# Patient Record
Sex: FEMALE | Race: BLACK OR AFRICAN AMERICAN | Employment: OTHER | URBAN - METROPOLITAN AREA
[De-identification: names, ages, dates, MRNs, and addresses within clinical notes are randomized per-mention and may not be internally consistent; named-entity substitution may affect disease eponyms.]

---

## 2017-02-18 ENCOUNTER — APPOINTMENT (OUTPATIENT)
Dept: NUCLEAR MEDICINE | Age: 72
End: 2017-02-18
Attending: NURSE PRACTITIONER
Payer: MEDICARE

## 2017-02-18 ENCOUNTER — HOSPITAL ENCOUNTER (OUTPATIENT)
Age: 72
Setting detail: OBSERVATION
Discharge: HOME | End: 2017-02-19
Attending: EMERGENCY MEDICINE | Admitting: HOSPITALIST
Payer: MEDICARE

## 2017-02-18 DIAGNOSIS — N18.4 CKD (CHRONIC KIDNEY DISEASE), STAGE 4 (SEVERE): ICD-10-CM

## 2017-02-18 DIAGNOSIS — I82.411 ACUTE DEEP VEIN THROMBOSIS (DVT) OF FEMORAL VEIN OF RIGHT LOWER EXTREMITY (HCC): Primary | ICD-10-CM

## 2017-02-18 DIAGNOSIS — R06.09 DOE (DYSPNEA ON EXERTION): ICD-10-CM

## 2017-02-18 LAB
ALBUMIN SERPL BCP-MCNC: 3.6 G/DL (ref 3.5–5)
ALBUMIN/GLOB SERPL: 0.9 {RATIO} (ref 1.1–2.2)
ALP SERPL-CCNC: 102 U/L (ref 45–117)
ALT SERPL-CCNC: 24 U/L (ref 12–78)
ANION GAP BLD CALC-SCNC: 7 MMOL/L (ref 5–15)
APTT PPP: 23.4 SEC (ref 22.1–32.5)
AST SERPL W P-5'-P-CCNC: 16 U/L (ref 15–37)
BASOPHILS # BLD AUTO: 0 K/UL (ref 0–0.1)
BASOPHILS # BLD: 0 % (ref 0–1)
BILIRUB SERPL-MCNC: 0.5 MG/DL (ref 0.2–1)
BUN SERPL-MCNC: 39 MG/DL (ref 6–20)
BUN/CREAT SERPL: 14 (ref 12–20)
CALCIUM SERPL-MCNC: 9.2 MG/DL (ref 8.5–10.1)
CHLORIDE SERPL-SCNC: 106 MMOL/L (ref 97–108)
CO2 SERPL-SCNC: 28 MMOL/L (ref 21–32)
CREAT SERPL-MCNC: 2.85 MG/DL (ref 0.55–1.02)
EOSINOPHIL # BLD: 0.1 K/UL (ref 0–0.4)
EOSINOPHIL NFR BLD: 1 % (ref 0–7)
ERYTHROCYTE [DISTWIDTH] IN BLOOD BY AUTOMATED COUNT: 18.4 % (ref 11.5–14.5)
GLOBULIN SER CALC-MCNC: 3.8 G/DL (ref 2–4)
GLUCOSE SERPL-MCNC: 67 MG/DL (ref 65–100)
HCT VFR BLD AUTO: 36.8 % (ref 35–47)
HGB BLD-MCNC: 11.9 G/DL (ref 11.5–16)
INR PPP: 1 (ref 0.9–1.1)
LYMPHOCYTES # BLD AUTO: 12 % (ref 12–49)
LYMPHOCYTES # BLD: 1 K/UL (ref 0.8–3.5)
MCH RBC QN AUTO: 28.5 PG (ref 26–34)
MCHC RBC AUTO-ENTMCNC: 32.3 G/DL (ref 30–36.5)
MCV RBC AUTO: 88.2 FL (ref 80–99)
MONOCYTES # BLD: 1 K/UL (ref 0–1)
MONOCYTES NFR BLD AUTO: 12 % (ref 5–13)
NEUTS SEG # BLD: 5.9 K/UL (ref 1.8–8)
NEUTS SEG NFR BLD AUTO: 75 % (ref 32–75)
PLATELET # BLD AUTO: 166 K/UL (ref 150–400)
POTASSIUM SERPL-SCNC: 4.1 MMOL/L (ref 3.5–5.1)
PROT SERPL-MCNC: 7.4 G/DL (ref 6.4–8.2)
PROTHROMBIN TIME: 10.4 SEC (ref 9–11.1)
RBC # BLD AUTO: 4.17 M/UL (ref 3.8–5.2)
SODIUM SERPL-SCNC: 141 MMOL/L (ref 136–145)
THERAPEUTIC RANGE,PTTT: NORMAL SECS (ref 58–77)
TROPONIN I SERPL-MCNC: 0.04 NG/ML
WBC # BLD AUTO: 8 K/UL (ref 3.6–11)

## 2017-02-18 PROCEDURE — 85730 THROMBOPLASTIN TIME PARTIAL: CPT | Performed by: EMERGENCY MEDICINE

## 2017-02-18 PROCEDURE — 80053 COMPREHEN METABOLIC PANEL: CPT | Performed by: NURSE PRACTITIONER

## 2017-02-18 PROCEDURE — 36415 COLL VENOUS BLD VENIPUNCTURE: CPT | Performed by: NURSE PRACTITIONER

## 2017-02-18 PROCEDURE — 93971 EXTREMITY STUDY: CPT

## 2017-02-18 PROCEDURE — 85025 COMPLETE CBC W/AUTO DIFF WBC: CPT | Performed by: NURSE PRACTITIONER

## 2017-02-18 PROCEDURE — 99218 HC RM OBSERVATION: CPT

## 2017-02-18 PROCEDURE — 99284 EMERGENCY DEPT VISIT MOD MDM: CPT

## 2017-02-18 PROCEDURE — 85610 PROTHROMBIN TIME: CPT | Performed by: EMERGENCY MEDICINE

## 2017-02-18 PROCEDURE — 84484 ASSAY OF TROPONIN QUANT: CPT | Performed by: NURSE PRACTITIONER

## 2017-02-18 PROCEDURE — 93005 ELECTROCARDIOGRAM TRACING: CPT

## 2017-02-18 PROCEDURE — A9558 XE133 XENON 10MCI: HCPCS

## 2017-02-18 RX ORDER — ENOXAPARIN SODIUM 100 MG/ML
1 INJECTION SUBCUTANEOUS
Status: DISCONTINUED | OUTPATIENT
Start: 2017-02-18 | End: 2017-02-19 | Stop reason: SDUPTHER

## 2017-02-18 NOTE — IP AVS SNAPSHOT
Current Discharge Medication List  
  
Take these medications at their scheduled times Dose & Instructions Dispensing Information Comments Morning Noon Evening Bedtime ACTOS 30 mg tablet Generic drug:  pioglitazone Your next dose is: Today, Tomorrow Other:  ____________ Dose:  15 mg Take 15 mg by mouth daily. Refills:  0  
     
   
   
   
  
 amLODIPine 5 mg tablet Commonly known as:  [place] Dipika Your next dose is: Today, Tomorrow Other:  ____________ Dose:  5 mg Take 5 mg by mouth daily. Refills:  0  
     
   
   
   
  
 apixaban 5 mg tablet Commonly known as:  Keyanna Niece Your next dose is: Today, Tomorrow Other:  ____________ Dose:  5 mg Take 1 Tab by mouth every twelve (12) hours. Quantity:  60 Tab Refills:  0  
     
   
   
   
  
 aspirin delayed-release 81 mg tablet Your next dose is: Today, Tomorrow Other:  ____________ Dose:  81 mg Take 1 Tab by mouth daily. Quantity:  30 Tab Refills:  0  
     
   
   
   
  
 bumetanide 2 mg tablet Commonly known as:  Fara Shruti Your next dose is: Today, Tomorrow Other:  ____________ Dose:  2 mg Take 2 mg by mouth daily. Indications: EDEMA Refills:  0 DULoxetine 60 mg capsule Commonly known as:  CYMBALTA Your next dose is: Today, Tomorrow Other:  ____________ Dose:  60 mg Take 60 mg by mouth daily. Refills:  0 HumaLOG Mix 75-25 100 unit/mL (75-25) injection Generic drug:  insulin lispro protamine/insulin lispro Your next dose is: Today, Tomorrow Other:  ____________ Dose:  22 Units 22 Units by SubCUTAneous route two (2) times a day. Refills:  0  
     
   
   
   
  
 VITAMIN D3 1,000 unit Cap Generic drug:  cholecalciferol Your next dose is: Today, Tomorrow Other:  ____________ Dose:  1000 Units Take 1,000 Units by mouth daily. Refills:  0 Take these medications as needed Dose & Instructions Dispensing Information Comments Morning Noon Evening Bedtime HYDROcodone-acetaminophen 7.5-325 mg per tablet Commonly known as:  Shmuel Peacock Your next dose is: Today, Tomorrow Other:  ____________ Dose:  1 Tab Take 1 Tab by mouth every four (4) hours as needed. Max Daily Amount: 6 Tabs. Quantity:  8 Tab Refills:  0 Take these medications as directed Dose & Instructions Dispensing Information Comments Morning Noon Evening Bedtime  
 atorvastatin 40 mg tablet Commonly known as:  LIPITOR Your next dose is: Today, Tomorrow Other:  ____________ Refills:  0 CENTRUM COMPLETE PO Your next dose is: Today, Tomorrow Other:  ____________ Take  by mouth. Refills:  0 Where to Get Your Medications Information about where to get these medications is not yet available ! Ask your nurse or doctor about these medications  
  apixaban 5 mg tablet  
 aspirin delayed-release 81 mg tablet HYDROcodone-acetaminophen 7.5-325 mg per tablet

## 2017-02-18 NOTE — ED TRIAGE NOTES
Pt states that she woke up this morning with pain to her right calf and has now moved up behind her right knee. Pt states that she has had right foot swelling for the past 2 months and was told by her PCP to come to the ED to rule out a DVT. Pt is currently on aggronox.

## 2017-02-18 NOTE — PROCEDURES
[facility]  *** FINAL REPORT ***    Name: Yanet Pickett  MRN: [id number]  : 01 Sep 1945  HIS Order #: [id number]  [address] [facility] Visit #: [id number]  Date: 2017    TYPE OF TEST: Peripheral Venous Testing    REASON FOR TEST  Limb swelling    Right Leg:-  Deep venous thrombosis:           Yes  Proximal extent of thrombus:      Mid Femoral  Superficial venous thrombosis:    No  Deep venous insufficiency:        Not examined  Superficial venous insufficiency: Not examined      INTERPRETATION/FINDINGS  PROCEDURE:  Color duplex ultrasound imaging of lower extremity veins. FINDINGS:       Right:  Consistent with thrombosis involving the mid and distal  femoral, popliteal and gastroc veins as demonstrated by vein  non-compressibility, and by a narrowing or occlusion of the flow  channel on color Doppler imaging. The calf veins are suboptimally  visualized but are suggestive of partially occlusive thrombus. The  common femoral, deep femoral, proximal femoral and great saphenous are   patent and without evidence of thrombus where visualized; each is is  compressible and there is no narrowing of the flow channel on color  Doppler imaging. Phasic flow is observed in the common femoral vein. Left:   The common femoral vein is patent and without evidence of   thrombus. Phasic flow is observed. This extremity was not otherwise   evaluated. IMPRESSION:  There is evidence of vein thrombosis, as described above. The ultrasound appearance is more consistent with an acute process  than a chronic process. ADDITIONAL COMMENTS    I have personally reviewed the data relevant to the interpretation of  this  study.     TECHNOLOGIST: Rene Blackman RVT  Signed: 2017 05:32 PM    PHYSICIAN: Hipolito Morrell MD  Signed: 2017 08:02 AM

## 2017-02-18 NOTE — IP AVS SNAPSHOT
[address] 
[phone] Patient: Amalia Redding MRN: [id number] CHW:[id number] You are allergic to the following No active allergies Recent Documentation Height Weight Breastfeeding? BMI OB Status Smoking Status 1.549 m 88 kg No 36.66 kg/m2 Hysterectomy Never Smoker Emergency Contacts Name Discharge Info Relation Home Work Mobile Jasiel Moreira DISCHARGE CAREGIVER [3] Spouse [3] [phone] About your hospitalization You were admitted on:  February 18, 2017 You last received care in the:  [facility] You were discharged on:  February 19, 2017 Unit phone number:  [phone] Why you were hospitalized Your primary diagnosis was:  Right Leg Dvt (Hcc) Providers Seen During Your Hospitalizations Provider Role Specialty Primary office phone Litzy Navarro MD Attending Provider Emergency Medicine [phone] Yakelin Hilario MD Attending Provider Internal Medicine [phone] Mitzie Boeck, MD Attending Provider Hospitalist [phone] Your Primary Care Physician (PCP) Primary Care Physician Office Phone Office Fax Jory Abler [phone] Follow-up Information Follow up With Details Comments Contact Info Jeovany Brooke MD Schedule an appointment as soon as possible for a visit in 1 week for hospital discharge follow-up, lab check [place] [address] 
[phone] Current Discharge Medication List  
  
START taking these medications Dose & Instructions Dispensing Information Comments Morning Noon Evening Bedtime  
 apixaban 5 mg tablet Commonly known as:  Annel Minus Your next dose is: Today, Tomorrow Other:  _________ Dose:  5 mg Take 1 Tab by mouth every twelve (12) hours. Quantity:  60 Tab Refills:  0  
     
   
   
   
  
 aspirin delayed-release 81 mg tablet Your next dose is: Today, Tomorrow Other:  _________ Dose:  81 mg Take 1 Tab by mouth daily. Quantity:  30 Tab Refills:  0 HYDROcodone-acetaminophen 7.5-325 mg per tablet Commonly known as:  Angeline Punt Your next dose is: Today, Tomorrow Other:  _________ Dose:  1 Tab Take 1 Tab by mouth every four (4) hours as needed. Max Daily Amount: 6 Tabs. Quantity:  8 Tab Refills:  0 CONTINUE these medications which have NOT CHANGED Dose & Instructions Dispensing Information Comments Morning Noon Evening Bedtime ACTOS 30 mg tablet Generic drug:  pioglitazone Your next dose is: Today, Tomorrow Other:  _________ Dose:  15 mg Take 15 mg by mouth daily. Refills:  0  
     
   
   
   
  
 amLODIPine 5 mg tablet Commonly known as:  Tiffani Kev Your next dose is: Today, Tomorrow Other:  _________ Dose:  5 mg Take 5 mg by mouth daily. Refills:  0  
     
   
   
   
  
 atorvastatin 40 mg tablet Commonly known as:  LIPITOR Your next dose is: Today, Tomorrow Other:  _________ Refills:  0  
     
   
   
   
  
 bumetanide 2 mg tablet Commonly known as:  Assunta Brink Your next dose is: Today, Tomorrow Other:  _________ Dose:  2 mg Take 2 mg by mouth daily. Indications: EDEMA Refills:  0 CENTRUM COMPLETE PO Your next dose is: Today, Tomorrow Other:  _________ Take  by mouth. Refills:  0 DULoxetine 60 mg capsule Commonly known as:  CYMBALTA Your next dose is: Today, Tomorrow Other:  _________ Dose:  60 mg Take 60 mg by mouth daily. Refills:  0 HumaLOG Mix 75-25 100 unit/mL (75-25) injection Generic drug:  insulin lispro protamine/insulin lispro Your next dose is: Today, Tomorrow Other:  _________ Dose:  22 Units 22 Units by SubCUTAneous route two (2) times a day. Refills:  0  
     
   
   
   
  
 VITAMIN D3 1,000 unit Cap Generic drug:  cholecalciferol Your next dose is: Today, Tomorrow Other:  _________ Dose:  1000 Units Take 1,000 Units by mouth daily. Refills:  0 STOP taking these medications   
 aspirin-dipyridamole  mg per SR capsule Commonly known as:  AGGRENOX Where to Get Your Medications Information on where to get these meds will be given to you by the nurse or doctor. ! Ask your nurse or doctor about these medications  
  apixaban 5 mg tablet  
 aspirin delayed-release 81 mg tablet HYDROcodone-acetaminophen 7.5-325 mg per tablet Discharge Instructions Discharge Instructions PATIENT ID: Fatimah Oh MRN: [id number] YOB: 1945 DATE OF ADMISSION: 2/18/2017  4:45 PM   
DATE OF DISCHARGE: 2/19/2017 PRIMARY CARE PROVIDER: Fanny Pa MD  
 
ATTENDING PHYSICIAN: Opal Saleh MD 
DISCHARGING PROVIDER: Opal Saleh MD   
To contact this individual call [phone] and ask the  to page. If unavailable ask to be transferred the Adult Hospitalist Department. DISCHARGE DIAGNOSES DVT (blood clot in right leg) CONSULTATIONS: IP CONSULT TO HOSPITALIST 
 
PROCEDURES/SURGERIES: * No surgery found * PENDING TEST RESULTS:  
At the time of discharge the following test results are still pending: n/a FOLLOW UP APPOINTMENTS:  
Follow-up Information Follow up With Details Comments Contact Info Fanny Pa MD Schedule an appointment as soon as possible for a visit in 1 week for hospital discharge follow-up, lab check [place] [address] 
[phone] ADDITIONAL CARE RECOMMENDATIONS:  
You were admitted with a blood clot in the right leg. Take the blood thinner as prescribed. Be mindful for any bleeding. If you experience significant bleeding, return to the hospital. Follow-up with your primary doctor otherwise for routine care. Your Aggrenox was stopped and changed to aspirin. Do not resume Aggrenox while on Eliquis, at least until you follow-up with your primary doctor. DIET: Cardiac Diet ACTIVITY: Activity as tolerated WOUND CARE: n/a 
 
EQUIPMENT needed: n/a DISCHARGE MEDICATIONS: 
 See Medication Reconciliation Form · It is important that you take the medication exactly as they are prescribed. · Keep your medication in the bottles provided by the pharmacist and keep a list of the medication names, dosages, and times to be taken in your wallet. · Do not take other medications without consulting your doctor. NOTIFY YOUR PHYSICIAN FOR ANY OF THE FOLLOWING:  
Fever over 101 degrees for 24 hours. Chest pain, shortness of breath, fever, chills, nausea, vomiting, diarrhea, change in mentation, falling, weakness, bleeding. Severe pain or pain not relieved by medications. Or, any other signs or symptoms that you may have questions about. DISPOSITION: 
x  Home With: 
 OT  PT  [facility]  RN  
  
 SNF/Inpatient Rehab/LTAC Independent/assisted living Hospice Other: CDMP Checked:  
Yes x PROBLEM LIST Updated: 
Yes x Signed:  
Kathleen Schlatter, MD 
2/19/2017 1:35 PM 
 
 
 
DISCHARGE SUMMARY from Nurse The following personal items are in your possession at time of discharge: 
 
Dental Appliances: None Visual Aid: Glasses Home Medications: None Jewelry: Tod Adolfo Clothing: Pants, Shirt, Undergarments, Other (comment), Socks Other Valuables: None PATIENT INSTRUCTIONS: 
 
After general anesthesia or intravenous sedation, for 24 hours or while taking prescription Narcotics: · Limit your activities · Do not drive and operate hazardous machinery · Do not make important personal or business decisions · Do  not drink alcoholic beverages · If you have not urinated within 8 hours after discharge, please contact your surgeon on call. Report the following to your surgeon: 
· Excessive pain, swelling, redness or odor of or around the surgical area · Temperature over 100.5 · Nausea and vomiting lasting longer than 4 hours or if unable to take medications · Any signs of decreased circulation or nerve impairment to extremity: change in color, persistent  numbness, tingling, coldness or increase pain · Any questions These are general instructions for a healthy lifestyle: No smoking/ No tobacco products/ Avoid exposure to second hand smoke Surgeon General's Warning:  Quitting smoking now greatly reduces serious risk to your health. Obesity, smoking, and sedentary lifestyle greatly increases your risk for illness A healthy diet, regular physical exercise & weight monitoring are important for maintaining a healthy lifestyle You may be retaining fluid if you have a history of heart failure or if you experience any of the following symptoms:  Weight gain of 3 pounds or more overnight or 5 pounds in a week, increased swelling in our hands or feet or shortness of breath while lying flat in bed. Please call your doctor as soon as you notice any of these symptoms; do not wait until your next office visit. Recognize signs and symptoms of STROKE: 
 
F-face looks uneven A-arms unable to move or move unevenly S-speech slurred or non-existent T-time-call 911 as soon as signs and symptoms begin-DO NOT go Back to bed or wait to see if you get better-TIME IS BRAIN. Warning Signs of HEART ATTACK Call 911 if you have these symptoms: 
? Chest discomfort.  Most heart attacks involve discomfort in the center of the chest that lasts more than a few minutes, or that goes away and comes back. It can feel like uncomfortable pressure, squeezing, fullness, or pain. ? Discomfort in other areas of the upper body. Symptoms can include pain or discomfort in one or both arms, the back, neck, jaw, or stomach. ? Shortness of breath with or without chest discomfort. ? Other signs may include breaking out in a cold sweat, nausea, or lightheadedness. Don't wait more than five minutes to call [address]! Fast action can save your life. Calling 911 is almost always the fastest way to get lifesaving treatment. Emergency Medical Services staff can begin treatment when they arrive  up to an hour sooner than if someone gets to the hospital by car. The discharge information has been reviewed with the patient. The patient verbalized understanding. Discharge medications reviewed with the patient and appropriate educational materials and side effects teaching were provided. [employer] Activation Thank you for requesting access to [employer]. Please follow the instructions below to securely access and download your online medical record. [employer] allows you to send messages to your doctor, view your test results, renew your prescriptions, schedule appointments, and more. How Do I Sign Up? 1. In your internet browser, go to [email or web address] 
2. Click on the First Time User? Click Here link in the Sign In box. You will be redirect to the New Member Sign Up page. 3. Enter your [employer] Access Code exactly as it appears below. You will not need to use this code after youve completed the sign-up process. If you do not sign up before the expiration date, you must request a new code. [employer] Access Code: [email or web address] Expires: 2017  9:13 AM (This is the date your [employer] access code will ) 4.  Enter the last four digits of your Social Security Number (xxxx) and Date of Birth (mm/dd/yyyy) as indicated and click Submit. You will be taken to the next sign-up page. 5. Create a [employer] ID. This will be your [employer] login ID and cannot be changed, so think of one that is secure and easy to remember. 6. Create a [employer] password. You can change your password at any time. 7. Enter your Password Reset Question and Answer. This can be used at a later time if you forget your password. 8. Enter your e-mail address. You will receive e-mail notification when new information is available in [address] 9. Click Sign Up. You can now view and download portions of your medical record. 10. Click the Download Summary menu link to download a portable copy of your medical information. Additional Information If you have questions, please visit the Frequently Asked Questions section of the [employer] website at [email or web address] Remember, [employer] is NOT to be used for urgent needs. For medical emergencies, dial 911. Discharge Orders None [employer] Announcement We are excited to announce that we are making your provider's discharge notes available to you in [employer]. You will see these notes when they are completed and signed by the physician that discharged you from your recent hospital stay. If you have any questions or concerns about any information you see in [employer], please call the Health Information Department where you were seen or reach out to your Primary Care Provider for more information about your plan of care. Introducing [facility]! [facility] introduces [employer] patient portal. Now you can access parts of your medical record, email your doctor's office, and request medication refills online. 1. In your internet browser, go to [email or web address] 2. Click on the First Time User? Click Here link in the Sign In box. You will see the New Member Sign Up page. 3. Enter your [employer] Access Code exactly as it appears below. You will not need to use this code after youve completed the sign-up process. If you do not sign up before the expiration date, you must request a new code. · [employer] Access Code: [email or web address] Expires: 2/20/2017  9:13 AM 
 
4. Enter the last four digits of your Social Security Number (xxxx) and Date of Birth (mm/dd/yyyy) as indicated and click Submit. You will be taken to the next sign-up page. 5. Create a [employer] ID. This will be your [employer] login ID and cannot be changed, so think of one that is secure and easy to remember. 6. Create a [employer] password. You can change your password at any time. 7. Enter your Password Reset Question and Answer. This can be used at a later time if you forget your password. 8. Enter your e-mail address. You will receive e-mail notification when new information is available in [address] 9. Click Sign Up. You can now view and download portions of your medical record. 10. Click the Download Summary menu link to download a portable copy of your medical information. If you have questions, please visit the Frequently Asked Questions section of the [employer] website. Remember, [employer] is NOT to be used for urgent needs. For medical emergencies, dial 911. Now available from your iPhone and Android! General Information Please provide this summary of care documentation to your next provider. Patient Signature:  ____________________________________________________________ Date:  ____________________________________________________________  
  
Elmymurray Naylor Provider Signature:  ____________________________________________________________ Date:  ____________________________________________________________

## 2017-02-19 VITALS
OXYGEN SATURATION: 98 % | HEART RATE: 77 BPM | DIASTOLIC BLOOD PRESSURE: 73 MMHG | WEIGHT: 194 LBS | HEIGHT: 61 IN | SYSTOLIC BLOOD PRESSURE: 154 MMHG | BODY MASS INDEX: 36.63 KG/M2 | RESPIRATION RATE: 16 BRPM | TEMPERATURE: 97.8 F

## 2017-02-19 PROBLEM — I82.401 RIGHT LEG DVT (HCC): Status: ACTIVE | Noted: 2017-02-19

## 2017-02-19 LAB
ANION GAP BLD CALC-SCNC: 12 MMOL/L (ref 5–15)
ATRIAL RATE: 77 BPM
BUN SERPL-MCNC: 33 MG/DL (ref 6–20)
BUN/CREAT SERPL: 12 (ref 12–20)
CALCIUM SERPL-MCNC: 8.4 MG/DL (ref 8.5–10.1)
CALCULATED P AXIS, ECG09: 61 DEGREES
CALCULATED R AXIS, ECG10: 16 DEGREES
CALCULATED T AXIS, ECG11: 170 DEGREES
CHLORIDE SERPL-SCNC: 108 MMOL/L (ref 97–108)
CO2 SERPL-SCNC: 26 MMOL/L (ref 21–32)
CREAT SERPL-MCNC: 2.68 MG/DL (ref 0.55–1.02)
DIAGNOSIS, 93000: NORMAL
GLUCOSE BLD STRIP.AUTO-MCNC: 177 MG/DL (ref 65–100)
GLUCOSE BLD STRIP.AUTO-MCNC: 216 MG/DL (ref 65–100)
GLUCOSE BLD STRIP.AUTO-MCNC: 233 MG/DL (ref 65–100)
GLUCOSE SERPL-MCNC: 198 MG/DL (ref 65–100)
P-R INTERVAL, ECG05: 154 MS
POTASSIUM SERPL-SCNC: 4 MMOL/L (ref 3.5–5.1)
Q-T INTERVAL, ECG07: 402 MS
QRS DURATION, ECG06: 70 MS
QTC CALCULATION (BEZET), ECG08: 454 MS
SERVICE CMNT-IMP: ABNORMAL
SODIUM SERPL-SCNC: 146 MMOL/L (ref 136–145)
TROPONIN I SERPL-MCNC: 0.05 NG/ML
TROPONIN I SERPL-MCNC: 0.05 NG/ML
VENTRICULAR RATE, ECG03: 77 BPM

## 2017-02-19 PROCEDURE — 82962 GLUCOSE BLOOD TEST: CPT

## 2017-02-19 PROCEDURE — 93306 TTE W/DOPPLER COMPLETE: CPT

## 2017-02-19 PROCEDURE — 80048 BASIC METABOLIC PNL TOTAL CA: CPT | Performed by: NURSE PRACTITIONER

## 2017-02-19 PROCEDURE — 74011636637 HC RX REV CODE- 636/637: Performed by: NURSE PRACTITIONER

## 2017-02-19 PROCEDURE — 74011250637 HC RX REV CODE- 250/637: Performed by: HOSPITALIST

## 2017-02-19 PROCEDURE — A9270 NON-COVERED ITEM OR SERVICE: HCPCS | Performed by: NURSE PRACTITIONER

## 2017-02-19 PROCEDURE — 84484 ASSAY OF TROPONIN QUANT: CPT | Performed by: NURSE PRACTITIONER

## 2017-02-19 PROCEDURE — 36415 COLL VENOUS BLD VENIPUNCTURE: CPT | Performed by: NURSE PRACTITIONER

## 2017-02-19 PROCEDURE — 74011250636 HC RX REV CODE- 250/636: Performed by: NURSE PRACTITIONER

## 2017-02-19 PROCEDURE — 74011250637 HC RX REV CODE- 250/637: Performed by: NURSE PRACTITIONER

## 2017-02-19 PROCEDURE — 96372 THER/PROPH/DIAG INJ SC/IM: CPT

## 2017-02-19 PROCEDURE — 99218 HC RM OBSERVATION: CPT

## 2017-02-19 RX ORDER — SODIUM CHLORIDE 0.9 % (FLUSH) 0.9 %
5-10 SYRINGE (ML) INJECTION EVERY 8 HOURS
Status: DISCONTINUED | OUTPATIENT
Start: 2017-02-19 | End: 2017-02-19 | Stop reason: HOSPADM

## 2017-02-19 RX ORDER — SODIUM CHLORIDE 9 MG/ML
100 INJECTION, SOLUTION INTRAVENOUS CONTINUOUS
Status: DISCONTINUED | OUTPATIENT
Start: 2017-02-19 | End: 2017-02-19

## 2017-02-19 RX ORDER — PIOGLITAZONEHYDROCHLORIDE 15 MG/1
15 TABLET ORAL DAILY
Status: DISCONTINUED | OUTPATIENT
Start: 2017-02-19 | End: 2017-02-19 | Stop reason: HOSPADM

## 2017-02-19 RX ORDER — MAGNESIUM SULFATE 100 %
4 CRYSTALS MISCELLANEOUS AS NEEDED
Status: DISCONTINUED | OUTPATIENT
Start: 2017-02-19 | End: 2017-02-19 | Stop reason: HOSPADM

## 2017-02-19 RX ORDER — AMLODIPINE BESYLATE 5 MG/1
5 TABLET ORAL DAILY
Status: DISCONTINUED | OUTPATIENT
Start: 2017-02-19 | End: 2017-02-19 | Stop reason: HOSPADM

## 2017-02-19 RX ORDER — AMLODIPINE BESYLATE 5 MG/1
5 TABLET ORAL DAILY
COMMUNITY
End: 2018-09-04 | Stop reason: DRUGHIGH

## 2017-02-19 RX ORDER — INSULIN LISPRO 100 [IU]/ML
INJECTION, SOLUTION INTRAVENOUS; SUBCUTANEOUS
Status: DISCONTINUED | OUTPATIENT
Start: 2017-02-19 | End: 2017-02-19 | Stop reason: HOSPADM

## 2017-02-19 RX ORDER — SODIUM CHLORIDE 0.9 % (FLUSH) 0.9 %
5-10 SYRINGE (ML) INJECTION AS NEEDED
Status: DISCONTINUED | OUTPATIENT
Start: 2017-02-19 | End: 2017-02-19 | Stop reason: HOSPADM

## 2017-02-19 RX ORDER — ATORVASTATIN CALCIUM 40 MG/1
40 TABLET, FILM COATED ORAL
Status: DISCONTINUED | OUTPATIENT
Start: 2017-02-19 | End: 2017-02-19 | Stop reason: HOSPADM

## 2017-02-19 RX ORDER — BUMETANIDE 1 MG/1
2 TABLET ORAL DAILY
Status: DISCONTINUED | OUTPATIENT
Start: 2017-02-19 | End: 2017-02-19 | Stop reason: HOSPADM

## 2017-02-19 RX ORDER — HYDROCODONE BITARTRATE AND ACETAMINOPHEN 7.5; 325 MG/1; MG/1
1 TABLET ORAL
Qty: 8 TAB | Refills: 0 | Status: SHIPPED | OUTPATIENT
Start: 2017-02-19 | End: 2018-09-04

## 2017-02-19 RX ORDER — MELATONIN
1000 DAILY
Status: DISCONTINUED | OUTPATIENT
Start: 2017-02-19 | End: 2017-02-19 | Stop reason: HOSPADM

## 2017-02-19 RX ORDER — DULOXETIN HYDROCHLORIDE 60 MG/1
60 CAPSULE, DELAYED RELEASE ORAL DAILY
Status: DISCONTINUED | OUTPATIENT
Start: 2017-02-19 | End: 2017-02-19 | Stop reason: HOSPADM

## 2017-02-19 RX ORDER — ASPIRIN 81 MG/1
81 TABLET ORAL DAILY
Status: DISCONTINUED | OUTPATIENT
Start: 2017-02-19 | End: 2017-02-19 | Stop reason: HOSPADM

## 2017-02-19 RX ORDER — DEXTROSE 50 % IN WATER (D50W) INTRAVENOUS SYRINGE
12.5-25 AS NEEDED
Status: DISCONTINUED | OUTPATIENT
Start: 2017-02-19 | End: 2017-02-19 | Stop reason: HOSPADM

## 2017-02-19 RX ORDER — HYDROCODONE BITARTRATE AND ACETAMINOPHEN 7.5; 325 MG/1; MG/1
1 TABLET ORAL
Status: DISCONTINUED | OUTPATIENT
Start: 2017-02-19 | End: 2017-02-19 | Stop reason: HOSPADM

## 2017-02-19 RX ORDER — ASPIRIN 81 MG/1
81 TABLET ORAL DAILY
Qty: 30 TAB | Refills: 0 | Status: SHIPPED | OUTPATIENT
Start: 2017-02-19 | End: 2018-09-04

## 2017-02-19 RX ORDER — ENOXAPARIN SODIUM 100 MG/ML
90 INJECTION SUBCUTANEOUS EVERY 24 HOURS
Status: DISCONTINUED | OUTPATIENT
Start: 2017-02-19 | End: 2017-02-19 | Stop reason: HOSPADM

## 2017-02-19 RX ADMIN — INSULIN LISPRO 22 UNITS: 100 INJECTION, SUSPENSION SUBCUTANEOUS at 09:42

## 2017-02-19 RX ADMIN — ENOXAPARIN SODIUM 90 MG: 100 INJECTION SUBCUTANEOUS at 01:47

## 2017-02-19 RX ADMIN — INSULIN LISPRO 3 UNITS: 100 INJECTION, SOLUTION INTRAVENOUS; SUBCUTANEOUS at 09:41

## 2017-02-19 RX ADMIN — DULOXETINE HYDROCHLORIDE 60 MG: 60 CAPSULE, DELAYED RELEASE ORAL at 09:41

## 2017-02-19 RX ADMIN — HYDROCODONE BITARTRATE AND ACETAMINOPHEN 1 TABLET: 7.5; 325 TABLET ORAL at 13:47

## 2017-02-19 RX ADMIN — Medication 10 ML: at 15:42

## 2017-02-19 RX ADMIN — INSULIN LISPRO 3 UNITS: 100 INJECTION, SOLUTION INTRAVENOUS; SUBCUTANEOUS at 11:35

## 2017-02-19 RX ADMIN — PIOGLITAZONE HYDROCHLORIDE 15 MG: 15 TABLET ORAL at 09:41

## 2017-02-19 RX ADMIN — VITAMIN D, TAB 1000IU (100/BT) 1000 UNITS: 25 TAB at 09:41

## 2017-02-19 RX ADMIN — AMLODIPINE BESYLATE 5 MG: 5 TABLET ORAL at 09:41

## 2017-02-19 RX ADMIN — SODIUM CHLORIDE 100 ML/HR: 900 INJECTION, SOLUTION INTRAVENOUS at 01:30

## 2017-02-19 RX ADMIN — ASPIRIN 81 MG: 81 TABLET, COATED ORAL at 09:41

## 2017-02-19 RX ADMIN — BUMETANIDE 2 MG: 1 TABLET ORAL at 09:41

## 2017-02-19 RX ADMIN — MULTIPLE VITAMINS W/ MINERALS TAB 1 TABLET: TAB at 09:41

## 2017-02-19 RX ADMIN — ATORVASTATIN CALCIUM 40 MG: 40 TABLET, FILM COATED ORAL at 01:32

## 2017-02-19 RX ADMIN — Medication 10 ML: at 01:32

## 2017-02-19 NOTE — DISCHARGE SUMMARY
Discharge Summary     Patient: Caryle Hanly MRN: [id number]  SSN: [id number]    YOB: 1945  Age: 73 y.o. Sex: female       Admit Date: 2/18/2017    Discharge Date: 2/19/2017      Admission Diagnoses: dvt    Discharge Diagnoses:   Acute RLE DVT    Chronic diagnoses:  History of CVA  DM  Hyperlipidemia  Hypertension  CKD stage IV    Discharge Condition: Stable    Hospital Course: 73 y.o lady witht he above medical problems presented with acute onset right leg pain and swelling for one day. Work-up revealed an acute RLE DVT. V/Q scan of the lungs was negative for PE. She was started on sq Lovenox. I discussed with her and her  the different options of warfarin and novel anticoagulants at length, including risks and benefits. She opted for Eliquis. Her co-pay for a 30-day supply is $10. I discussed the dosing with pharmacy given renal impairment. The patient was counseled on the increased risk of bleeding. Aggrenox was stopped and changed to baby aspirin while she's on Eliquis. She was given a small course of PRN Norco for leg pain. She will follow-up with her PCP. Consults: None    Significant Diagnostic Studies:   V/Q scan, lung: Normal ventilation/perfusion lung imaging study. Lower-extremity dopplers: Right: Consistent with thrombosis involving the mid and distal femoral, popliteal and gastroc veins    Disposition: home    S: feels well other than some right calf pain. No chest pain, dyspnea, n/v/d. Would like to go home today.     O:   Visit Vitals    /73 (BP 1 Location: Left arm, BP Patient Position: At rest)    Pulse 77    Temp 97.8 °F (36.6 °C)    Resp 16    Ht 5' 1\" (1.549 m)    Wt 88 kg (194 lb)    SpO2 98%    Breastfeeding No    BMI 36.66 kg/m2     Gen: NAD, non-toxic  HEENT: anicteric sclerae, normal conjunctiva  Neck: supple  Heart: RRR, no MRG, no JVD, 1+ RLE edema  Lungs: CTA b/l, non-labored respirations  Abd: soft, NT, ND, bs+  Extr: warm  Neuro: grossly non-focal  Psych: normal mood, appropriate affect    Discharge Medications:   Current Discharge Medication List      START taking these medications    Details   aspirin delayed-release 81 mg tablet Take 1 Tab by mouth daily. Qty: 30 Tab, Refills: 0      HYDROcodone-acetaminophen (NORCO) 7.5-325 mg per tablet Take 1 Tab by mouth every four (4) hours as needed. Max Daily Amount: 6 Tabs. Qty: 8 Tab, Refills: 0      apixaban (ELIQUIS) 5 mg tablet Take 1 Tab by mouth every twelve (12) hours. Qty: 60 Tab, Refills: 0         CONTINUE these medications which have NOT CHANGED    Details   amLODIPine (NORVASC) 5 mg tablet Take 5 mg by mouth daily. cholecalciferol (VITAMIN D3) 1,000 unit cap Take 1,000 Units by mouth daily. insulin lispro protamine/insulin lispro (HUMALOG MIX 75-25) 100 unit/mL (75-25) injection 22 Units by SubCUTAneous route two (2) times a day. DULoxetine (CYMBALTA) 60 mg capsule Take 60 mg by mouth daily. atorvastatin (LIPITOR) 40 mg tablet       bumetanide (BUMEX) 2 mg tablet Take 2 mg by mouth daily. Indications: EDEMA      pioglitazone (ACTOS) 30 mg tablet Take 15 mg by mouth daily. FOLIC ACID/MV,FE,OTHER MIN (CENTRUM COMPLETE PO) Take  by mouth.          STOP taking these medications       dipyridamole-aspirin (AGGRENOX) 200-25 mg per SR capsule Comments:   Reason for Stopping:               FOLLOW UP APPOINTMENTS:   Follow-up Information     Follow up With Details Comments Contact Info    Rosslyn Gosselin, MD Schedule an appointment as soon as possible for a visit in 1 week for hospital discharge follow-up, lab check [facility]  [address] [address] [facility]  [phone]             Signed By: Ranjana Monzon MD     February 19, 2017

## 2017-02-19 NOTE — ED NOTES
TRANSFER - OUT REPORT:    Verbal report given to [facility] RN (name) on Catherine Monet  being transferred to [facility] (unit) for routine progression of care       Report consisted of patients Situation, Background, Assessment and   Recommendations(SBAR). Information from the following report(s) ED Summary was reviewed with the receiving nurse. Lines:   Peripheral IV 02/18/17 Left; Inner Forearm (Active)   Site Assessment Clean, dry, & intact 2/18/2017  7:09 PM   Phlebitis Assessment 0 2/18/2017  7:09 PM   Infiltration Assessment 0 2/18/2017  7:09 PM   Dressing Status Clean, dry, & intact 2/18/2017  7:09 PM   Hub Color/Line Status Blue 2/18/2017  7:09 PM        Opportunity for questions and clarification was provided. Patient transported with:  Tech    This RN spoke with  at length about her long stay in ER. Patient has been fed. Awaiting transport upstairs.

## 2017-02-19 NOTE — PROGRESS NOTES
Spoke with Dr. Radha Michelle and he said that we did not need to drawn the BMP and CBC that were ordered for this morning and that we do not need to continue drawing a Troponin.

## 2017-02-19 NOTE — ED NOTES
Pt is A&OX3 airway patent resting on str without signs of distress. Pt asking to eat, and advised that this nurse would speak with the provider about that.  expressing that they have not seen a Dr at all and that they have been here a long time. Pt and  advised that this nurse would speak with the NP and have her come see them. Pt and  expressed appreciation of such information.

## 2017-02-19 NOTE — PROGRESS NOTES
Dr. Leah Nolasco wanted me to call the outpatient pharmacy to see if the patient's insurance would cover Eliquis, but they are closed today so Dr. Leah Nolasco asked me to page the case management to see if they could tell us.

## 2017-02-19 NOTE — ED PROVIDER NOTES
HPI Comments: The pt is a 73year old female who presents from home with complaints of right leg pain. Referred by PCP to rule out DVT. Pt reports swelling of the leg consistently for the last two months. No history of clotting disorders. Associated symptoms include KWON. 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 Pt denies fevers, chills, night sweats, chest pain, pressure, PND, orthopnea, abdominal pain, n/v/d, melena, hematuria, dysuria, constipation, HA, dizziness, and syncope. Past Medical History:    Arthritis                                                     CAD (coronary artery disease)                                 Diabetes ([facility]                                                Hypertension                                                  [facility]                                                  Past Surgical History:    HX GYN                                                         HX ORTHOPAEDIC                                                   Comment:right knee replacement    PCP:  Nanci Mtz MD             Patient is a 73 y.o. female presenting with leg pain. The history is provided by the patient. Leg Pain    This is a new problem. The current episode started yesterday. The problem occurs constantly. The problem has been rapidly worsening. Pain location: right calf and back of knee. The quality of the pain is described as aching and sharp. The pain is at a severity of 9/10. The pain is moderate. Pertinent negatives include no numbness, full range of motion, no stiffness, no tingling, no itching, no back pain and no neck pain. She has tried nothing for the symptoms.  The treatment provided no relief. There has been no history of extremity trauma. Past Medical History:   Diagnosis Date    Arthritis     CAD (coronary artery disease)     Diabetes ([facility] [address]     Hypertension     Stroke [facility])        Past Surgical History:   Procedure Laterality Date    Hx gyn      Hx orthopaedic       right knee replacement         History reviewed. No pertinent family history. Social History     Social History    Marital status:      Spouse name: N/A    Number of children: N/A    Years of education: N/A     Occupational History    Not on file. Social History Main Topics    Smoking status: Never Smoker    Smokeless tobacco: Not on file    Alcohol use Yes      Comment: rarely     Drug use: No    Sexual activity: Not on file     Other Topics Concern    Not on file     Social History Narrative         ALLERGIES: Review of patient's allergies indicates no known allergies. Review of Systems   Constitutional: Negative for activity change, appetite change, chills, diaphoresis, fatigue, fever and unexpected weight change. HENT: Negative for congestion, ear pain, rhinorrhea, sinus pressure, sore throat and tinnitus. Eyes: Negative for photophobia, pain, discharge and visual disturbance. Respiratory: Negative for apnea, cough, choking, chest tightness, shortness of breath, wheezing and stridor. KWON   Cardiovascular: Negative for chest pain, palpitations and leg swelling. Gastrointestinal: Negative for abdominal pain, constipation, diarrhea, nausea and vomiting. Endocrine: Negative for polydipsia, polyphagia and polyuria. Genitourinary: Negative for decreased urine volume, dyspareunia, dysuria, enuresis, flank pain, frequency, hematuria and urgency. Musculoskeletal: Negative for arthralgias, back pain, gait problem, myalgias, neck pain and stiffness. Right lower extremity swelling and pain    Skin: Negative for color change, itching, pallor, rash and wound. Allergic/Immunologic: Negative for immunocompromised state. Neurological: Negative for dizziness, tingling, seizures, syncope, weakness, light-headedness, numbness and headaches. Hematological: Does not bruise/bleed easily. Psychiatric/Behavioral: Negative for agitation and confusion. The patient is not nervous/anxious. Vitals:    02/18/17 1632 02/18/17 2000 02/18/17 2030   BP: 187/81 158/71 184/72   Pulse: 74     Resp: 16 18 18   Temp: 98.3 °F (36.8 °C)     SpO2: 97% 98% 96%   Weight: 88 kg (194 lb)     Height: 5' 1\" (1.549 m)              Physical Exam   Constitutional: She is oriented to person, place, and time. She appears well-developed and well-nourished. No distress. HENT:   Head: Normocephalic. Right Ear: External ear normal.   Left Ear: External ear normal.   Mouth/Throat: Oropharynx is clear and moist. No oropharyngeal exudate. Eyes: Conjunctivae and EOM are normal. Pupils are equal, round, and reactive to light. Right eye exhibits no discharge. Left eye exhibits no discharge. No scleral icterus. Neck: Normal range of motion. Neck supple. No JVD present. No tracheal deviation present. No thyromegaly present. Cardiovascular: Normal rate, regular rhythm, normal heart sounds, intact distal pulses and normal pulses. PMI is not displaced. Exam reveals no gallop and no friction rub. No murmur heard. Pulmonary/Chest: Effort normal and breath sounds normal. No accessory muscle usage or stridor. No respiratory distress. She has no decreased breath sounds. She has no wheezes. She has no rhonchi. She has no rales. She exhibits no tenderness. Abdominal: Soft. Bowel sounds are normal. She exhibits no distension and no mass. There is no hepatosplenomegaly. There is no tenderness. There is no rigidity, no rebound, no guarding, no CVA tenderness, no tenderness at McBurney's point and negative Gannon's sign. Musculoskeletal: Normal range of motion. She exhibits no edema or tenderness. Right knee: She exhibits swelling. Legs:  Lymphadenopathy:     She has no cervical adenopathy. Neurological: She is alert and oriented to person, place, and time. She has normal strength. She displays normal reflexes. No cranial nerve deficit or sensory deficit. Coordination normal. GCS eye subscore is 4. GCS verbal subscore is 5. GCS motor subscore is 6. Skin: Skin is warm and dry. No rash noted. She is not diaphoretic. No erythema. No pallor. Psychiatric: She has a normal mood and affect. Her behavior is normal. Judgment and thought content normal.   Nursing note and vitals reviewed. MDM  Number of Diagnoses or Management Options  Acute deep vein thrombosis (DVT) of femoral vein of right lower extremity ([address]):   CKD (chronic kidney disease), stage 4 (severe) ([address]):   KWON (dyspnea on exertion):   Diagnosis management comments:    * Routine laboratory data   * Duplex   * VQ scan   * Consult to hospitalist    * Lovenox        Amount and/or Complexity of Data Reviewed  Clinical lab tests: ordered and reviewed  Tests in the radiology section of CPT®: ordered and reviewed  Review and summarize past medical records: yes  Discuss the patient with other providers: yes    Risk of Complications, Morbidity, and/or Mortality  General comments:    - stable, ambulatory pt in NAD    Patient Progress  Patient progress: stable    ED Course       Procedures      CONSULT NOTE:   9:04 PM  Bushra Nuñez NP spoke with Dr. Rosa Maria Montes MD,   Specialty: Hospitalist  Discussed pt's hx, disposition, and available diagnostic and imaging results. Reviewed care plans. Consultant agrees with plans as outlined. Admit to inpatient. Bushra Nuñez NP       9:04 PM  Patient is being admitted to the hospital.  The results of their tests and reasons for their admission have been discussed with them and/or available family.   They convey agreement and understanding for the need to be admitted and for their admission diagnosis. Consultation has been made with the inpatient physician specialist for hospitalization. LABORATORY TESTS:  Recent Results (from the past 12 hour(s))   METABOLIC PANEL, COMPREHENSIVE    Collection Time: 02/18/17  6:00 PM   Result Value Ref Range    Sodium 141 136 - 145 mmol/L    Potassium 4.1 3.5 - 5.1 mmol/L    Chloride 106 97 - 108 mmol/L    CO2 28 21 - 32 mmol/L    Anion gap 7 5 - 15 mmol/L    Glucose 67 65 - 100 mg/dL    BUN 39 (H) 6 - 20 MG/DL    Creatinine 2.85 (H) 0.55 - 1.02 MG/DL    BUN/Creatinine ratio 14 12 - 20      GFR est AA 20 (L) >60 ml/min/1.73m2    GFR est non-AA 16 (L) >60 ml/min/1.73m2    Calcium 9.2 8.5 - 10.1 MG/DL    Bilirubin, total 0.5 0.2 - 1.0 MG/DL    ALT (SGPT) 24 12 - 78 U/L    AST (SGOT) 16 15 - 37 U/L    Alk. phosphatase 102 45 - 117 U/L    Protein, total 7.4 6.4 - 8.2 g/dL    Albumin 3.6 3.5 - 5.0 g/dL    Globulin 3.8 2.0 - 4.0 g/dL    A-G Ratio 0.9 (L) 1.1 - 2.2     CBC WITH AUTOMATED DIFF    Collection Time: 02/18/17  6:00 PM   Result Value Ref Range    WBC 8.0 3.6 - 11.0 K/uL    RBC 4.17 3.80 - 5.20 M/uL    HGB 11.9 11.5 - 16.0 g/dL    HCT 36.8 35.0 - 47.0 %    MCV 88.2 80.0 - 99.0 FL    MCH 28.5 26.0 - 34.0 PG    MCHC 32.3 30.0 - 36.5 g/dL    RDW 18.4 (H) 11.5 - 14.5 %    PLATELET 832 989 - 280 K/uL    NEUTROPHILS 75 32 - 75 %    LYMPHOCYTES 12 12 - 49 %    MONOCYTES 12 5 - 13 %    EOSINOPHILS 1 0 - 7 %    BASOPHILS 0 0 - 1 %    ABS. NEUTROPHILS 5.9 1.8 - 8.0 K/UL    ABS. LYMPHOCYTES 1.0 0.8 - 3.5 K/UL    ABS. MONOCYTES 1.0 0.0 - 1.0 K/UL    ABS. EOSINOPHILS 0.1 0.0 - 0.4 K/UL    ABS.  BASOPHILS 0.0 0.0 - 0.1 K/UL   TROPONIN I    Collection Time: 02/18/17  6:00 PM   Result Value Ref Range    Troponin-I, Qt. 0.04 <0.05 ng/mL   PTT    Collection Time: 02/18/17  7:07 PM   Result Value Ref Range    aPTT 23.4 22.1 - 32.5 sec    aPTT, therapeutic range     58.0 - 77.0 SECS   PROTHROMBIN TIME + INR    Collection Time: 02/18/17  7:07 PM   Result Value Ref Range INR 1.0 0.9 - 1.1      Prothrombin time 10.4 9.0 - 11.1 sec   EKG, 12 LEAD, INITIAL    Collection Time: 02/18/17  7:19 PM   Result Value Ref Range    Ventricular Rate 77 BPM    Atrial Rate 77 BPM    P-R Interval 154 ms    QRS Duration 70 ms    Q-T Interval 402 ms    QTC Calculation (Bezet) 454 ms    Calculated P Axis 61 degrees    Calculated R Axis 16 degrees    Calculated T Axis 170 degrees    Diagnosis       Normal sinus rhythm  Septal infarct , age undetermined  T wave abnormality, consider inferolateral ischemia  When compared with ECG of 10-SEP-2014 18:31,  No significant change was found         IMAGING RESULTS:  DUPLEX LOWER EXT VENOUS RIGHT         NM LUNG VENT/PERF    (Results Pending)     No results found. MEDICATIONS GIVEN:  Medications   enoxaparin (LOVENOX) injection 90 mg (not administered)       IMPRESSION:  1. Acute deep vein thrombosis (DVT) of femoral vein of right lower extremity (HCC)    2. KWON (dyspnea on exertion)    3. CKD (chronic kidney disease), stage 4 (severe) (HCC)        PLAN:  1.  Admit to [address] [place] [address] Street, NP  9:05 PM

## 2017-02-19 NOTE — H&P
History & Physical  Observation Unit    Date of admission: 2/18/2017    Patient name: Farideh Silver  MRN: [id number]  YOB: 1945  Age: 73 y.o. Primary care provider:  Nani Iniguez MD     Source of Information: patient, medical records and attending physician                              Chief complain: right leg pain     History of present illness  Farideh Silver is a 73 y.o. female with past medical history as documented below  who presents with right leg pain. This patient reports that she fell injuring her right leg ~ 2 weeks prior but noted over past 24 hours that she was experiencing right leg swelling and pain. She denies worsening SOB, Cough, chest pain, fever, hemoptysis, melena, hematochezia or change in LOC. She endorses eye pain secondary to recent left eye surgery. As her pain worsen she presented to the ER at [facility] for evaluation. In the Er her diagnostic studies revealed Doppler +for thrombosis involving the mid and distal femoral, popliteal and gastroc veins; lung scan was normal perfusion. Her laboraroty data revealed WBC 8.0, hgb 11.9, plt 166, creatine 2.85. She is now admitted for observation for continued evaluation and treatment of acute DVT  Past Medical History   Diagnosis Date    Arthritis     CAD (coronary artery disease)     Diabetes ([facility]     Hypertension     Stroke [facility])       Past Surgical History   Procedure Laterality Date    Hx gyn      Hx orthopaedic       right knee replacement     Prior to Admission medications    Medication Sig Start Date End Date Taking? Authorizing Provider   cholecalciferol (VITAMIN D3) 1,000 unit cap Take 1,000 Units by mouth daily. Historical Provider   AMLODIPINE BESYLATE, BULK, Take 5 mg by mouth daily.  12/1/16   Historical Provider   insulin lispro protamine/insulin lispro (HUMALOG MIX 75-25) 100 unit/mL (75-25) injection 22 Units by SubCUTAneous route two (2) times a day. Sai Hayes MD   DULoxetine (CYMBALTA) 60 mg capsule Take 60 mg by mouth daily. Sai Hayes MD   atorvastatin (LIPITOR) 40 mg tablet  10/24/14   Sai Hayes MD   bumetanide (BUMEX) 2 mg tablet Take 2 mg by mouth daily. Indications: EDEMA    Sai Hayes MD   pioglitazone (ACTOS) 30 mg tablet Take 15 mg by mouth daily. Sai Hayes MD   dipyridamole-aspirin (AGGRENOX) 200-25 mg per SR capsule Take 1 Cap by mouth two (2) times a day. Sai Hayes MD   FOLIC ACID/MV,FE,OTHER MIN (CENTRUM COMPLETE PO) Take  by mouth. Sai Hayes MD     No Known Allergies   History reviewed. No pertinent family history. Family history reviewed and non-contributory. Social history  Patient resides    Independently    x  With family care      Assisted living      SNF    Ambulates    Independently    x  With cane     x  Assisted walker         Alcohol history   x  None     Social     Chronic   Smoking history    None   x  Former smoker     Current smoker       Code status  x  Full code     DNR/DNI        Code status discussed with the patient/caregivers. Prior    Review of systems  The patient denies any fever, chills, chest pain, cough, congestion, recent illness, palpitations, or dysuria. Constitutional: positive for fatigue  Eyes: positive for contacts/glasses and visual disturbance  Ears, Nose, Mouth, Throat, and Face: negative  Respiratory: positive for cough or dyspnea on exertion  Cardiovascular: negative  Gastrointestinal: negative  Genitourinary:negative  Hematologic/Lymphatic: positive for easy bruising  Musculoskeletal:positive for myalgias, arthralgias, stiff joints, neck pain and back pain  Neurological: negative   The remainder of the review of systems was reviewed and is noncontributory.     Physical Examination   Visit Vitals    /74    Pulse 74    Temp 98.3 °F (36.8 °C)    Resp 18    Ht 5' 1\" (1.549 m)    Wt 88 kg (194 lb)    SpO2 98%    BMI 36.66 kg/m2          O2 Device: Room air    General:  Alert, cooperative, no distress   Head:  Normocephalic, without obvious abnormality, atraumatic   Eyes:  Conjunctivae/corneas clear. PERRL, EOMs intact   E/N/M/T: Nares normal. Septum midline. No nasal drainage or sinus tenderness  Lips, mucosa, and tongue normal   Teeth and gums normal  Clear oropharynx   Neck: Normal appearance and movements, symmetrical, trachea midline  No palpable adenopathy  No thyroid enlargement, tenderness or nodules  No carotid bruit   Normal JVP   Lungs:   Symmetrical chest expansion and respiratory effort  Clear to auscultation bilaterally   Chest wall:  No tenderness or deformity   Heart:  Regular rhythm   Sounds normal; no murmur, click, rub or gallop   Abdomen:   Soft, no tenderness  Bowel sounds normal  No masses or hepatosplenomegaly  No hernias present   Back: No CVA tenderness   Extremities: Right leg swelling    Pulses 2+ and symmetric all extremities   Skin: No rashes or ulcers   Musculo-      skeletal: Gait not tested  Normal symmetry, ROM, strength and tone   Neuro: Normal cranial nerves  Normal reflexes and sensation   Psych: Alert, oriented x3  Normal affect, judgement and insight   Geniturinary: Deferred      Data Review        24 Hour Results:  Recent Results (from the past 24 hour(s))   METABOLIC PANEL, COMPREHENSIVE    Collection Time: 02/18/17  6:00 PM   Result Value Ref Range    Sodium 141 136 - 145 mmol/L    Potassium 4.1 3.5 - 5.1 mmol/L    Chloride 106 97 - 108 mmol/L    CO2 28 21 - 32 mmol/L    Anion gap 7 5 - 15 mmol/L    Glucose 67 65 - 100 mg/dL    BUN 39 (H) 6 - 20 MG/DL    Creatinine 2.85 (H) 0.55 - 1.02 MG/DL    BUN/Creatinine ratio 14 12 - 20      GFR est AA 20 (L) >60 ml/min/1.73m2    GFR est non-AA 16 (L) >60 ml/min/1.73m2    Calcium 9.2 8.5 - 10.1 MG/DL    Bilirubin, total 0.5 0.2 - 1.0 MG/DL    ALT (SGPT) 24 12 - 78 U/L    AST (SGOT) 16 15 - 37 U/L    Alk.  phosphatase 102 45 - 117 U/L    Protein, total 7.4 6.4 - 8.2 g/dL Albumin 3.6 3.5 - 5.0 g/dL    Globulin 3.8 2.0 - 4.0 g/dL    A-G Ratio 0.9 (L) 1.1 - 2.2     CBC WITH AUTOMATED DIFF    Collection Time: 02/18/17  6:00 PM   Result Value Ref Range    WBC 8.0 3.6 - 11.0 K/uL    RBC 4.17 3.80 - 5.20 M/uL    HGB 11.9 11.5 - 16.0 g/dL    HCT 36.8 35.0 - 47.0 %    MCV 88.2 80.0 - 99.0 FL    MCH 28.5 26.0 - 34.0 PG    MCHC 32.3 30.0 - 36.5 g/dL    RDW 18.4 (H) 11.5 - 14.5 %    PLATELET 079 104 - 435 K/uL    NEUTROPHILS 75 32 - 75 %    LYMPHOCYTES 12 12 - 49 %    MONOCYTES 12 5 - 13 %    EOSINOPHILS 1 0 - 7 %    BASOPHILS 0 0 - 1 %    ABS. NEUTROPHILS 5.9 1.8 - 8.0 K/UL    ABS. LYMPHOCYTES 1.0 0.8 - 3.5 K/UL    ABS. MONOCYTES 1.0 0.0 - 1.0 K/UL    ABS. EOSINOPHILS 0.1 0.0 - 0.4 K/UL    ABS.  BASOPHILS 0.0 0.0 - 0.1 K/UL   TROPONIN I    Collection Time: 02/18/17  6:00 PM   Result Value Ref Range    Troponin-I, Qt. 0.04 <0.05 ng/mL   PTT    Collection Time: 02/18/17  7:07 PM   Result Value Ref Range    aPTT 23.4 22.1 - 32.5 sec    aPTT, therapeutic range     58.0 - 77.0 SECS   PROTHROMBIN TIME + INR    Collection Time: 02/18/17  7:07 PM   Result Value Ref Range    INR 1.0 0.9 - 1.1      Prothrombin time 10.4 9.0 - 11.1 sec   EKG, 12 LEAD, INITIAL    Collection Time: 02/18/17  7:19 PM   Result Value Ref Range    Ventricular Rate 77 BPM    Atrial Rate 77 BPM    P-R Interval 154 ms    QRS Duration 70 ms    Q-T Interval 402 ms    QTC Calculation (Bezet) 454 ms    Calculated P Axis 61 degrees    Calculated R Axis 16 degrees    Calculated T Axis 170 degrees    Diagnosis       Normal sinus rhythm  Septal infarct , age undetermined  T wave abnormality, consider inferolateral ischemia  When compared with ECG of 10-SEP-2014 18:31,  No significant change was found       Recent Labs      02/18/17   1800   WBC  8.0   HGB  11.9   HCT  36.8   PLT  166     Recent Labs      02/18/17   1907  02/18/17   1800   NA   --   141   K   --   4.1   CL   --   106   CO2   --   28   GLU   --   67   BUN   --   39*   CREA --   2.85*   CA   --   9.2   ALB   --   3.6   TBILI   --   0.5   SGOT   --   16   ALT   --   24   INR  1.0   --        Imaging  FINDINGS: doppler lower extremity  Right: Consistent with thrombosis involving the mid and distal  femoral, popliteal and gastroc veins as demonstrated by vein  non-compressibility, and by a narrowing or occlusion of the flow  channel on color Doppler imaging. The calf veins are suboptimally  visualized but are suggestive of partially occlusive thrombus. The  common femoral, deep femoral, proximal femoral and great saphenous are  patent and without evidence of thrombus where visualized; each is is  compressible and there is no narrowing of the flow channel on color  Doppler imaging. Phasic flow is observed in the common femoral vein. Left: The common femoral vein is patent and without evidence of  thrombus. Phasic flow is observed. This extremity was not otherwise  evaluated.     IMPRESSION: There is evidence of vein thrombosis, as described above. The ultrasound appearance is more consistent with an acute process  than a chronic process. FINDINGS:   Ventilation lung imaging was performed following inhalation of Xenon-133 gas. The patient was ventilated to equilibrium and images were obtained from the  posterior projection during washout.     The ventilation is normal. There is no tracer retention.     Perfusion lung imaging was performed following intravenous administration of  Tc-99m MAA. Images were obtained from the anterior, posterior and right and left  anterior and posterior oblique projections.     The perfusion is normal. No segmental or subsegmental defects are identified.     IMPRESSION  IMPRESSION: Normal ventilation/perfusion lung imaging study    Assessment and Plan   1. Acute Right leg DVT s/p injury  - Admit to observation  - telemetry  - Enoxaparin  - ? Candidate for oral agents has been on coumadin in the past with TKR  - pain control   - monitor plt function    2.  Hx CVA  - continue Rx  - had been on aggrenox will change to ASA with active anticoagulation   3. Ocular proptosis s/p intervention   - need to make eye surgeon aware of the need to anticoagulate with frame in place  4. CKD-3  - monitor   5.  DM-2  - POC glucose AC/Hs      Diet: cardiac   Activity: as tolerated   Consultations: t/c eye  Anticipated disposition: 1-2 days Appropriate for observation       Signed by: Rosa Wu NP    February 18, 2017 at 10:44 PM

## 2017-02-19 NOTE — PROGRESS NOTES
REceived call from nurse. Suggested follow up with community pharmacy.  Also provided Eliquis [employer] savings card for pt to enroll in their program.    TIM Rivera

## 2017-02-19 NOTE — PROGRESS NOTES
1230 Patient complaining of pain in R leg and patient/family have questions about discharge. Paged MD to notify    18 Dr. Layne Cooks returned paged and gave telephone readback order for norco 1 tab Q4H PRN for pain and will round on unit to discuss discharge planning. [id number] Patient and  educated with discharge instructions and Rx. Patient and  verbalized understanding and the patient signed a copy of the discharge instructions that were then placed on the hard chart.

## 2017-02-19 NOTE — PROGRESS NOTES
TRANSFER - IN REPORT:    Verbal report received from [facility]name[facility]  being received from  ED(unit) for routine progression of care      Report consisted of patients Situation, Background, Assessment and   Recommendations(SBAR). Information from the following report(s) SBAR, Kardex and ED Summary was reviewed with the receiving nurse. Opportunity for questions and clarification was provided. Assessment completed upon patients arrival to unit and care assumed.

## 2017-02-19 NOTE — PROGRESS NOTES
Bedside and Verbal shift change report given to Madonna Marshall RN (oncoming nurse) by Lupe Tompkins RN (offgoing nurse). Report included the following information SBAR, Kardex, Intake/Output, MAR, Accordion and Recent Results.

## 2017-02-19 NOTE — DISCHARGE INSTRUCTIONS
Discharge Instructions       PATIENT ID: Darcy Mckeon  MRN: [id number]   YOB: 1945    DATE OF ADMISSION: 2/18/2017  4:45 PM    DATE OF DISCHARGE: 2/19/2017    PRIMARY CARE PROVIDER: Josette Lazo MD     ATTENDING PHYSICIAN: Teddy Saldivar MD  DISCHARGING PROVIDER: Teddy Saldivar MD    To contact this individual call [phone] and ask the  to page. If unavailable ask to be transferred the Adult Hospitalist Department. DISCHARGE DIAGNOSES DVT (blood clot in right leg)    CONSULTATIONS: IP CONSULT TO HOSPITALIST    PROCEDURES/SURGERIES: * No surgery found *    PENDING TEST RESULTS:   At the time of discharge the following test results are still pending: n/a    FOLLOW UP APPOINTMENTS:   Follow-up Information     Follow up With Details Comments Contact Info    Josette Lazo MD Schedule an appointment as soon as possible for a visit in 1 week for hospital discharge follow-up, lab check [facility] 83:   You were admitted with a blood clot in the right leg. Take the blood thinner as prescribed. Be mindful for any bleeding. If you experience significant bleeding, return to the hospital. Follow-up with your primary doctor otherwise for routine care. Your Aggrenox was stopped and changed to aspirin. Do not resume Aggrenox while on Eliquis, at least until you follow-up with your primary doctor. DIET: Cardiac Diet    ACTIVITY: Activity as tolerated    WOUND CARE: n/a    EQUIPMENT needed: n/a      DISCHARGE MEDICATIONS:   See Medication Reconciliation Form    · It is important that you take the medication exactly as they are prescribed. · Keep your medication in the bottles provided by the pharmacist and keep a list of the medication names, dosages, and times to be taken in your wallet. · Do not take other medications without consulting your doctor.        NOTIFY YOUR PHYSICIAN FOR ANY OF THE FOLLOWING:   Fever over 101 degrees for 24 hours. Chest pain, shortness of breath, fever, chills, nausea, vomiting, diarrhea, change in mentation, falling, weakness, bleeding. Severe pain or pain not relieved by medications. Or, any other signs or symptoms that you may have questions about. DISPOSITION:  x  Home With:   OT  PT  HH  RN       SNF/Inpatient Rehab/LTAC    Independent/assisted living    Hospice    Other:     CDMP Checked:   Yes x     PROBLEM LIST Updated:  Yes x       Signed:   Patt Bustamante MD  2/19/2017  1:35 PM        DISCHARGE SUMMARY from Nurse    The following personal items are in your possession at time of discharge:    Dental Appliances: None  Visual Aid: Glasses     Home Medications: None  Jewelry: Necklace, Earrings  Clothing: Pants, Shirt, Undergarments, Other (comment), Socks  Other Valuables: None             PATIENT INSTRUCTIONS:    After general anesthesia or intravenous sedation, for 24 hours or while taking prescription Narcotics:  · Limit your activities  · Do not drive and operate hazardous machinery  · Do not make important personal or business decisions  · Do  not drink alcoholic beverages  · If you have not urinated within 8 hours after discharge, please contact your surgeon on call. Report the following to your surgeon:  · Excessive pain, swelling, redness or odor of or around the surgical area  · Temperature over 100.5  · Nausea and vomiting lasting longer than 4 hours or if unable to take medications  · Any signs of decreased circulation or nerve impairment to extremity: change in color, persistent  numbness, tingling, coldness or increase pain  · Any questions    These are general instructions for a healthy lifestyle:    No smoking/ No tobacco products/ Avoid exposure to second hand smoke    Surgeon General's Warning:  Quitting smoking now greatly reduces serious risk to your health.     Obesity, smoking, and sedentary lifestyle greatly increases your risk for illness    A healthy diet, regular physical exercise & weight monitoring are important for maintaining a healthy lifestyle    You may be retaining fluid if you have a history of heart failure or if you experience any of the following symptoms:  Weight gain of 3 pounds or more overnight or 5 pounds in a week, increased swelling in our hands or feet or shortness of breath while lying flat in bed. Please call your doctor as soon as you notice any of these symptoms; do not wait until your next office visit. Recognize signs and symptoms of STROKE:    F-face looks uneven    A-arms unable to move or move unevenly    S-speech slurred or non-existent    T-time-call 911 as soon as signs and symptoms begin-DO NOT go       Back to bed or wait to see if you get better-TIME IS BRAIN. Warning Signs of HEART ATTACK     Call 911 if you have these symptoms:   Chest discomfort. Most heart attacks involve discomfort in the center of the chest that lasts more than a few minutes, or that goes away and comes back. It can feel like uncomfortable pressure, squeezing, fullness, or pain.  Discomfort in other areas of the upper body. Symptoms can include pain or discomfort in one or both arms, the back, neck, jaw, or stomach.  Shortness of breath with or without chest discomfort.  Other signs may include breaking out in a cold sweat, nausea, or lightheadedness. Don't wait more than five minutes to call 911 - MINUTES MATTER! Fast action can save your life. Calling 911 is almost always the fastest way to get lifesaving treatment. Emergency Medical Services staff can begin treatment when they arrive -- up to an hour sooner than if someone gets to the hospital by car. The discharge information has been reviewed with the patient. The patient verbalized understanding. Discharge medications reviewed with the patient and appropriate educational materials and side effects teaching were provided.     MyChart Activation    Thank you for requesting access to Tunespeak. Please follow the instructions below to securely access and download your online medical record. Tunespeak allows you to send messages to your doctor, view your test results, renew your prescriptions, schedule appointments, and more. How Do I Sign Up? 1. In your internet browser, go to [email or web address]  2. Click on the First Time User? Click Here link in the Sign In box. You will be redirect to the New Member Sign Up page. 3. Enter your Tunespeak Access Code exactly as it appears below. You will not need to use this code after youve completed the sign-up process. If you do not sign up before the expiration date, you must request a new code. Tunespeak Access Code: [email or web address]  Expires: 2017  9:13 AM (This is the date your Tunespeak access code will )    4. Enter the last four digits of your Social Security Number (xxxx) and Date of Birth (mm/dd/yyyy) as indicated and click Submit. You will be taken to the next sign-up page. 5. Create a Tunespeak ID. This will be your Tunespeak login ID and cannot be changed, so think of one that is secure and easy to remember. 6. Create a Tunespeak password. You can change your password at any time. 7. Enter your Password Reset Question and Answer. This can be used at a later time if you forget your password. 8. Enter your e-mail address. You will receive e-mail notification when new information is available in [address] 9. Click Sign Up. You can now view and download portions of your medical record. 10. Click the Download Summary menu link to download a portable copy of your medical information. Additional Information    If you have questions, please visit the Frequently Asked Questions section of the Tunespeak website at [email or web address] Remember, Tunespeak is NOT to be used for urgent needs. For medical emergencies, dial 911.

## 2017-02-19 NOTE — PROGRESS NOTES
Lovenox Daily Monitoring  Indication: DVT  Recent Labs      02/18/17   1907  02/18/17   1800   HGB   --   11.9   PLT   --   166   INR  1.0   --    CREA   --   2.85*     Date of last CBC: 2/18/17  Current Weight: 88 kg  Est. CrCl = ~18 ml/min  Current Dose: 90 mg subcutaneously every 12 hours. Plan: Change to Lovenox 90mg Subcutaneously Q24h per renal dose adjustment protocol.

## 2017-08-29 ENCOUNTER — HOSPITAL ENCOUNTER (OUTPATIENT)
Dept: ULTRASOUND IMAGING | Age: 72
Discharge: HOME | End: 2017-08-29
Attending: INTERNAL MEDICINE
Payer: MEDICARE

## 2017-08-29 DIAGNOSIS — I82.401: ICD-10-CM

## 2017-08-29 PROCEDURE — 93971 EXTREMITY STUDY: CPT

## 2018-02-16 ENCOUNTER — HOSPITAL ENCOUNTER (OUTPATIENT)
Age: 73
Setting detail: OUTPATIENT SURGERY
Discharge: HOME | End: 2018-02-16
Attending: INTERNAL MEDICINE | Admitting: INTERNAL MEDICINE
Payer: MEDICARE

## 2018-02-16 ENCOUNTER — ANESTHESIA EVENT (OUTPATIENT)
Dept: ENDOSCOPY | Age: 73
End: 2018-02-16
Payer: MEDICARE

## 2018-02-16 ENCOUNTER — ANESTHESIA (OUTPATIENT)
Dept: ENDOSCOPY | Age: 73
End: 2018-02-16
Payer: MEDICARE

## 2018-02-16 VITALS
OXYGEN SATURATION: 100 % | DIASTOLIC BLOOD PRESSURE: 95 MMHG | WEIGHT: 178 LBS | RESPIRATION RATE: 18 BRPM | HEART RATE: 72 BPM | TEMPERATURE: 97.8 F | SYSTOLIC BLOOD PRESSURE: 185 MMHG | HEIGHT: 61 IN | BODY MASS INDEX: 33.61 KG/M2

## 2018-02-16 LAB
GLUCOSE BLD STRIP.AUTO-MCNC: 56 MG/DL (ref 65–100)
GLUCOSE BLD STRIP.AUTO-MCNC: 92 MG/DL (ref 65–100)
SERVICE CMNT-IMP: ABNORMAL
SERVICE CMNT-IMP: NORMAL

## 2018-02-16 PROCEDURE — 77030009426 HC FCPS BIOP ENDOSC BSC -B: Performed by: INTERNAL MEDICINE

## 2018-02-16 PROCEDURE — 77030027957 HC TBNG IRR ENDOGTR BUSS -B: Performed by: INTERNAL MEDICINE

## 2018-02-16 PROCEDURE — 74011000250 HC RX REV CODE- 250: Performed by: INTERNAL MEDICINE

## 2018-02-16 PROCEDURE — 74011250636 HC RX REV CODE- 250/636: Performed by: INTERNAL MEDICINE

## 2018-02-16 PROCEDURE — 74011250636 HC RX REV CODE- 250/636

## 2018-02-16 PROCEDURE — 82962 GLUCOSE BLOOD TEST: CPT

## 2018-02-16 PROCEDURE — 76060000032 HC ANESTHESIA 0.5 TO 1 HR: Performed by: INTERNAL MEDICINE

## 2018-02-16 PROCEDURE — 76040000007: Performed by: INTERNAL MEDICINE

## 2018-02-16 PROCEDURE — 88305 TISSUE EXAM BY PATHOLOGIST: CPT | Performed by: INTERNAL MEDICINE

## 2018-02-16 RX ORDER — SODIUM CHLORIDE 9 MG/ML
INJECTION, SOLUTION INTRAVENOUS
Status: DISCONTINUED | OUTPATIENT
Start: 2018-02-16 | End: 2018-02-16 | Stop reason: HOSPADM

## 2018-02-16 RX ORDER — SODIUM CHLORIDE 0.9 % (FLUSH) 0.9 %
5-10 SYRINGE (ML) INJECTION EVERY 8 HOURS
Status: DISCONTINUED | OUTPATIENT
Start: 2018-02-16 | End: 2018-02-16 | Stop reason: HOSPADM

## 2018-02-16 RX ORDER — PROPOFOL 10 MG/ML
INJECTION, EMULSION INTRAVENOUS AS NEEDED
Status: DISCONTINUED | OUTPATIENT
Start: 2018-02-16 | End: 2018-02-16 | Stop reason: HOSPADM

## 2018-02-16 RX ORDER — DEXTROMETHORPHAN/PSEUDOEPHED 2.5-7.5/.8
1.2 DROPS ORAL
Status: DISCONTINUED | OUTPATIENT
Start: 2018-02-16 | End: 2018-02-16 | Stop reason: HOSPADM

## 2018-02-16 RX ORDER — SODIUM CHLORIDE 9 MG/ML
150 INJECTION, SOLUTION INTRAVENOUS CONTINUOUS
Status: DISCONTINUED | OUTPATIENT
Start: 2018-02-16 | End: 2018-02-16 | Stop reason: HOSPADM

## 2018-02-16 RX ORDER — SODIUM CHLORIDE 0.9 % (FLUSH) 0.9 %
5-10 SYRINGE (ML) INJECTION AS NEEDED
Status: DISCONTINUED | OUTPATIENT
Start: 2018-02-16 | End: 2018-02-16 | Stop reason: HOSPADM

## 2018-02-16 RX ORDER — MIDAZOLAM HYDROCHLORIDE 1 MG/ML
.25-5 INJECTION, SOLUTION INTRAMUSCULAR; INTRAVENOUS
Status: DISCONTINUED | OUTPATIENT
Start: 2018-02-16 | End: 2018-02-16 | Stop reason: HOSPADM

## 2018-02-16 RX ORDER — DEXTROSE 50 % IN WATER (D50W) INTRAVENOUS SYRINGE
25 AS NEEDED
Status: DISCONTINUED | OUTPATIENT
Start: 2018-02-16 | End: 2018-02-16 | Stop reason: HOSPADM

## 2018-02-16 RX ORDER — EPHEDRINE SULFATE 50 MG/ML
INJECTION, SOLUTION INTRAVENOUS
Status: DISCONTINUED
Start: 2018-02-16 | End: 2018-02-16 | Stop reason: HOSPADM

## 2018-02-16 RX ORDER — ATROPINE SULFATE 0.1 MG/ML
0.5 INJECTION INTRAVENOUS
Status: DISCONTINUED | OUTPATIENT
Start: 2018-02-16 | End: 2018-02-16 | Stop reason: HOSPADM

## 2018-02-16 RX ORDER — DEXTROSE 50 % IN WATER (D50W) INTRAVENOUS SYRINGE
Status: DISCONTINUED
Start: 2018-02-16 | End: 2018-02-16 | Stop reason: HOSPADM

## 2018-02-16 RX ORDER — EPINEPHRINE 0.1 MG/ML
1 INJECTION INTRACARDIAC; INTRAVENOUS
Status: DISCONTINUED | OUTPATIENT
Start: 2018-02-16 | End: 2018-02-16 | Stop reason: HOSPADM

## 2018-02-16 RX ADMIN — SODIUM CHLORIDE: 9 INJECTION, SOLUTION INTRAVENOUS at 09:15

## 2018-02-16 RX ADMIN — PROPOFOL 10 MG: 10 INJECTION, EMULSION INTRAVENOUS at 09:33

## 2018-02-16 RX ADMIN — PROPOFOL 10 MG: 10 INJECTION, EMULSION INTRAVENOUS at 09:47

## 2018-02-16 RX ADMIN — PROPOFOL 10 MG: 10 INJECTION, EMULSION INTRAVENOUS at 09:44

## 2018-02-16 RX ADMIN — PROPOFOL 50 MG: 10 INJECTION, EMULSION INTRAVENOUS at 09:55

## 2018-02-16 RX ADMIN — PROPOFOL 10 MG: 10 INJECTION, EMULSION INTRAVENOUS at 09:42

## 2018-02-16 RX ADMIN — PROPOFOL 30 MG: 10 INJECTION, EMULSION INTRAVENOUS at 09:30

## 2018-02-16 RX ADMIN — PROPOFOL 10 MG: 10 INJECTION, EMULSION INTRAVENOUS at 09:51

## 2018-02-16 RX ADMIN — PROPOFOL 10 MG: 10 INJECTION, EMULSION INTRAVENOUS at 09:36

## 2018-02-16 RX ADMIN — PROPOFOL 10 MG: 10 INJECTION, EMULSION INTRAVENOUS at 09:37

## 2018-02-16 RX ADMIN — DEXTROSE MONOHYDRATE 12.5 G: 500 INJECTION PARENTERAL at 09:14

## 2018-02-16 RX ADMIN — PROPOFOL 10 MG: 10 INJECTION, EMULSION INTRAVENOUS at 09:32

## 2018-02-16 RX ADMIN — PROPOFOL 10 MG: 10 INJECTION, EMULSION INTRAVENOUS at 09:34

## 2018-02-16 RX ADMIN — PROPOFOL 10 MG: 10 INJECTION, EMULSION INTRAVENOUS at 09:52

## 2018-02-16 RX ADMIN — PROPOFOL 50 MG: 10 INJECTION, EMULSION INTRAVENOUS at 10:07

## 2018-02-16 RX ADMIN — PROPOFOL 40 MG: 10 INJECTION, EMULSION INTRAVENOUS at 10:03

## 2018-02-16 RX ADMIN — PROPOFOL 30 MG: 10 INJECTION, EMULSION INTRAVENOUS at 10:14

## 2018-02-16 RX ADMIN — PROPOFOL 50 MG: 10 INJECTION, EMULSION INTRAVENOUS at 09:59

## 2018-02-16 RX ADMIN — PROPOFOL 50 MG: 10 INJECTION, EMULSION INTRAVENOUS at 09:29

## 2018-02-16 RX ADMIN — PROPOFOL 10 MG: 10 INJECTION, EMULSION INTRAVENOUS at 09:40

## 2018-02-16 RX ADMIN — PROPOFOL 10 MG: 10 INJECTION, EMULSION INTRAVENOUS at 09:49

## 2018-02-16 NOTE — ANESTHESIA POSTPROCEDURE EVALUATION
Post-Anesthesia Evaluation and Assessment    Patient: Griffin Serrano MRN: [id number]  SSN: [id number]    YOB: 1945  Age: 81 y.o. Sex: female       Cardiovascular Function/Vital Signs  Visit Vitals    BP (!) 185/95    Pulse 72    Temp 36.6 °C (97.8 °F)    Resp 18    Ht 5' 1\" (1.549 m)    Wt 80.7 kg (178 lb)    SpO2 100%    Breastfeeding No    BMI 33.63 kg/m2       Patient is status post MAC anesthesia for Procedure(s):  COLONOSCOPY  ESOPHAGOGASTRODUODENOSCOPY (EGD)  ESOPHAGOGASTRODUODENAL (EGD) BIOPSY. Nausea/Vomiting: None    Postoperative hydration reviewed and adequate. Pain:  Pain Scale 1: Numeric (0 - 10) (02/16/18 1029)  Pain Intensity 1: 0 (02/16/18 1029)   Managed    Neurological Status: At baseline    Mental Status and Level of Consciousness: Arousable    Pulmonary Status:   O2 Device: Room air (02/16/18 1029)   Adequate oxygenation and airway patent    Complications related to anesthesia: None    Post-anesthesia assessment completed.  No concerns    Signed By: Mary Nicholson MD     February 16, 2018

## 2018-02-16 NOTE — IP AVS SNAPSHOT
[address] 
[phone] Patient: Betsy Arshad MRN: [id number] [facility]:6/8/0142 About your hospitalization You were admitted on:  February 16, 2018 You last received care in the:  [facility] ENDOSCOPY You were discharged on:  February 16, 2018 Why you were hospitalized Your primary diagnosis was:  Not on File Follow-up Information None Discharge Orders None A check nataliya indicates which time of day the medication should be taken. My Medications CONTINUE taking these medications Instructions Each Dose to Equal  
 Morning Noon Evening Bedtime ACTOS 30 mg tablet Generic drug:  pioglitazone Your last dose was: Your next dose is: Take 15 mg by mouth daily. 15 mg  
    
   
   
   
  
 amLODIPine 5 mg tablet Commonly known as:  Rosio Colindres Your last dose was: Your next dose is: Take 5 mg by mouth daily. 5 mg  
    
   
   
   
  
 apixaban 5 mg tablet Commonly known as:  Joesph Aguilar Your last dose was: Your next dose is: Take 1 Tab by mouth every twelve (12) hours. 5 mg  
    
   
   
   
  
 aspirin delayed-release 81 mg tablet Your last dose was: Your next dose is: Take 1 Tab by mouth daily. 81 mg  
    
   
   
   
  
 atorvastatin 40 mg tablet Commonly known as:  LIPITOR Your last dose was: Your next dose is:    
   
   
      
   
   
   
  
 bumetanide 2 mg tablet Commonly known as:  Lowella Conway Your last dose was: Your next dose is: Take 2 mg by mouth daily. Indications: EDEMA 2 mg CENTRUM COMPLETE PO Your last dose was: Your next dose is: Take  by mouth. DULoxetine 60 mg capsule Commonly known as:  CYMBALTA Your last dose was: Your next dose is: Take 60 mg by mouth daily. 60 mg HumaLOG Mix 75-25(U-100)Insuln 100 unit/mL (75-25) injection Generic drug:  insulin lispro protamine/insulin lispro Your last dose was: Your next dose is:    
   
   
 22 Units by SubCUTAneous route two (2) times a day. 22 Units HYDROcodone-acetaminophen 7.5-325 mg per tablet Commonly known as:  Mortimer Newrhoda Your last dose was: Your next dose is: Take 1 Tab by mouth every four (4) hours as needed. Max Daily Amount: 6 Tabs. 1 Tab VITAMIN D3 1,000 unit Cap Generic drug:  cholecalciferol Your last dose was: Your next dose is: Take 1,000 Units by mouth daily. 1000 Units Discharge Instructions [address] [facility] Maximino Babcock. Allen Hanks M.D. 
[address] [place] [address], [address] [place] [address] 
[phone] EGD/COLONOSCOPY DISCHARGE INSTRUCTIONS Vik Valenzuela [id number] 
1945 DISCOMFORT: 
Redness at IV site- apply warm compress to area; if redness or soreness persist- contact your physician There may be a slight amount of blood passed from the rectum Gaseous discomfort- walking, belching will help relieve any discomfort You may not operate a vehicle for 12 hours You may not  engage in an occupation involving machinery or appliances for rest of today You may not  drink alcoholic beverages for at least 12 hours Avoid making any critical decisions for at least 24 hour DIET: 
 You may resume your normal diet, but some patients find that heavy or large  meals may lead to indigestion or vomiting. I suggest a light meal as first food  Intake. I recommend a whole food, plant-based diet for your overall health. ACTIVITY: 
You may resume your normal daily activities.   It is recommended that you spend the remainder of the day resting - avoid any strenuous activity. CALL M.D. ANY SIGN OF Increasing pain, nausea, vomiting Abdominal distension (swelling) New increased bleeding (oral or rectal) Fever (chills) Pain in chest area Bloody discharge from nose or mouth Shortness of breath Additional Instructions: 
 Call Dr. Maritza Adams if any questions or problems at [phone] You should receive the biopsy results by phone or mail within 3 weeks, if not, call my office for the results. EGD showed hiatal hernia, gastritis and duodenitis. Colonoscopy showed diverticulosis. Unable to see the whole colon. You will get a call by [facility] to setup for CT colonography to evaluate the whole colon. Resume Eliquis tomorrow if no signs of bleeding. Gastritis: Care Instructions Your Care Instructions Gastritis is a sore and upset stomach. It happens when something irritates the stomach lining. Many things can cause it. These include an infection such as the flu or something you ate or drank. Medicines or a sore on the lining of the stomach (ulcer) also can cause it. Your belly may bloat and ache. You may belch, vomit, and feel sick to your stomach. You should be able to relieve the problem by taking medicine. And it may help to change your diet. If gastritis lasts, your doctor may prescribe medicine. Follow-up care is a key part of your treatment and safety. Be sure to make and go to all appointments, and call your doctor if you are having problems. It's also a good idea to know your test results and keep a list of the medicines you take. How can you care for yourself at home? · If your doctor prescribed antibiotics, take them as directed. Do not stop taking them just because you feel better. You need to take the full course of antibiotics. · Be safe with medicines. If your doctor prescribed medicine to decrease stomach acid, take it as directed.  Call your doctor if you think you are having a problem with your medicine. · Do not take any other medicine, including over-the-counter pain relievers, without talking to your doctor first. 
· If your doctor recommends over-the-counter medicine to reduce stomach acid, such as Pepcid AC, Prilosec, Tagamet HB, or Zantac 75, follow the directions on the label. · Drink plenty of fluids (enough so that your urine is light yellow or clear like water) to prevent dehydration. Choose water and other caffeine-free clear liquids. If you have kidney, heart, or liver disease and have to limit fluids, talk with your doctor before you increase the amount of fluids you drink. · Limit how much alcohol you drink. · Avoid coffee, tea, cola drinks, chocolate, and other foods with caffeine. They increase stomach acid. When should you call for help? Call 911 anytime you think you may need emergency care. For example, call if: 
? · You vomit blood or what looks like coffee grounds. ? · You pass maroon or very bloody stools. ?Call your doctor now or seek immediate medical care if: 
? · You start breathing fast and have not produced urine in the last 8 hours. ? · You cannot keep fluids down. ? Watch closely for changes in your health, and be sure to contact your doctor if: 
? · You do not get better as expected. Where can you learn more? Go to http://yue-fitz.info/. Enter [id number] in the search box to learn more about \"Gastritis: Care Instructions. \" Current as of: May 12, 2017 Content Version: 11.4 © 4390-1457 Sasets.com. Care instructions adapted under license by [employer] (which disclaims liability or warranty for this information). If you have questions about a medical condition or this instruction, always ask your healthcare professional. [address] any warranty or liability for your use of this information. Hiatal Hernia: Care Instructions Your Care Instructions A hiatal hernia occurs when part of the stomach bulges into the chest cavity. A hiatal hernia may allow stomach acid and juices to back up into the esophagus (acid reflux). This can cause a feeling of burning, warmth, heat, or pain behind the breastbone. This feeling may often occur after you eat, soon after you lie down, or when you bend forward, and it may come and go. You also may have a sour taste in your mouth. These symptoms are commonly known as heartburn or reflux. But not all hiatal hernias cause symptoms. Follow-up care is a key part of your treatment and safety. Be sure to make and go to all appointments, and call your doctor if you are having problems. It's also a good idea to know your test results and keep a list of the medicines you take. How can you care for yourself at home? · Take your medicines exactly as prescribed. Call your doctor if you think you are having a problem with your medicine. · Do not take aspirin or other nonsteroidal anti-inflammatory drugs (NSAIDs), such as ibuprofen (Advil, Motrin) or naproxen (Aleve), unless your doctor says it is okay. Ask your doctor what you can take for pain. · Your doctor may recommend over-the-counter medicine. For mild or occasional indigestion, antacids such as Tums, Gaviscon, Maalox, or Mylanta may help. Your doctor also may recommend over-the-counter acid reducers, such as famotidine (Pepcid AC), cimetidine (Tagamet HB), ranitidine (Zantac 75 and Zantac 150), or omeprazole (Prilosec). Read and follow all instructions on the label. If you use these medicines often, talk with your doctor. · Change your eating habits. ¨ It's best to eat several small meals instead of two or three large meals. ¨ After you eat, wait 2 to 3 hours before you lie down. Late-night snacks aren't a good idea. ¨ Chocolate, mint, and alcohol can make heartburn worse. They relax the valve between the esophagus and the stomach. ¨ Spicy foods, foods that have a lot of acid (like tomatoes and oranges), and coffee can make heartburn symptoms worse in some people. If your symptoms are worse after you eat a certain food, you may want to stop eating that food to see if your symptoms get better. · Do not smoke or chew tobacco. 
· If you get heartburn at night, raise the head of your bed 6 to 8 inches by putting the frame on blocks or placing a foam wedge under the head of your mattress. (Adding extra pillows does not work.) · Do not wear tight clothing around your middle. · Lose weight if you need to. Losing just 5 to 10 pounds can help. When should you call for help? Call your doctor now or seek immediate medical care if: 
? · You have new or worse belly pain. ? · You are vomiting. ? Watch closely for changes in your health, and be sure to contact your doctor if: 
? · You have new or worse symptoms of indigestion. ? · You have trouble or pain swallowing. ? · You are losing weight. ? · You do not get better as expected. Where can you learn more? Go to http://yue-fitz.info/. Enter C136 in the search box to learn more about \"Hiatal Hernia: Care Instructions. \" Current as of: May 12, 2017 Content Version: 11.4 © 8617-4570 Healthwise, Incorporated. Care instructions adapted under license by [employer] (which disclaims liability or warranty for this information). If you have questions about a medical condition or this instruction, always ask your healthcare professional. [place] [address] any warranty or liability for your use of this information. Introducing [facility]! Dallas Landin introduces [employer] patient portal. Now you can access parts of your medical record, email your doctor's office, and request medication refills online. 1. In your internet browser, go to [email or web address] 2. Click on the First Time User? Click Here link in the Sign In box. You will see the New Member Sign Up page. 3. Enter your AlgEvolve Access Code exactly as it appears below. You will not need to use this code after youve completed the sign-up process. If you do not sign up before the expiration date, you must request a new code. · AlgEvolve Access Code: [email or web address] Expires: 5/17/2018 10:33 AM 
 
4. Enter the last four digits of your Social Security Number (xxxx) and Date of Birth (mm/dd/yyyy) as indicated and click Submit. You will be taken to the next sign-up page. 5. Create a AlgEvolve ID. This will be your AlgEvolve login ID and cannot be changed, so think of one that is secure and easy to remember. 6. Create a AlgEvolve password. You can change your password at any time. 7. Enter your Password Reset Question and Answer. This can be used at a later time if you forget your password. 8. Enter your e-mail address. You will receive e-mail notification when new information is available in [address] 9. Click Sign Up. You can now view and download portions of your medical record. 10. Click the Download Summary menu link to download a portable copy of your medical information. If you have questions, please visit the Frequently Asked Questions section of the AlgEvolve website. Remember, AlgEvolve is NOT to be used for urgent needs. For medical emergencies, dial 911. Now available from your iPhone and Android! Providers Seen During Your Hospitalization Provider Specialty Primary office phone Norberto Plasencia MD Gastroenterology [phone] Your Primary Care Physician (PCP) Primary Care Physician Office Phone Office Fax Ferddy Holland [phone] You are allergic to the following No active allergies Recent Documentation Height Weight Breastfeeding? BMI OB Status Smoking Status 1.549 m 80.7 kg No 33.63 kg/m2 Hysterectomy Never Smoker Emergency Contacts Name Discharge Info Relation Home Work Mobile Jasiel Moreira DISCHARGE CAREGIVER [3] Spouse [3] [phone] Patient Belongings The following personal items are in your possession at time of discharge: 
  Dental Appliances: None  Visual Aid: None Please provide this summary of care documentation to your next provider. Signatures-by signing, you are acknowledging that this After Visit Summary has been reviewed with you and you have received a copy. Patient Signature:  ____________________________________________________________ Date:  ____________________________________________________________  
  
Jatin Can Provider Signature:  ____________________________________________________________ Date:  ____________________________________________________________

## 2018-02-16 NOTE — ROUTINE PROCESS
[facility]  1945  [id number]    Situation:  Verbal report received from: Mar  Procedure: Procedure(s):  COLONOSCOPY  ESOPHAGOGASTRODUODENOSCOPY (EGD)  ESOPHAGOGASTRODUODENAL (EGD) BIOPSY    Background:    Preoperative diagnosis: ANEMIA   Postoperative diagnosis: 1.- Duodenitis  2.- Hiatal Hernia  3.- Gastritis  4.- Diverticulosis  5.- Incomplete Colonoscopy      :  Dr. Moraes  Assistant(s): Endoscopy Technician-1: Anna Pretty  Endoscopy RN-1: Gabi eWbster RN    Specimens:   ID Type Source Tests Collected by Time Destination   1 : Duodenum BX Preservative   Alejo Thornton MD 2/16/2018 8922 Pathology   2 : Gastric BX Preservative   Alejo Thornton MD 2/16/2018 6975 Pathology     H. Pylori  no    Assessment:  Intra-procedure medications     Anesthesia gave intra-procedure sedation and medications, see anesthesia flow sheet yes    Intravenous fluids: NS@ KVO     Vital signs stable yes    Abdominal assessment: round and soft yes    Recommendation:  Discharge patient per MD order yes .   Return to floor na  Family or Friend  fam  Permission to share finding with family or friend yes

## 2018-02-16 NOTE — PROGRESS NOTES

## 2018-02-16 NOTE — PROCEDURES
Joaquim Matos [facility] 912 Anila Armas M.D.  [address]  [phone]               Colonoscopy Procedure Note    NAME: Ady Ibrahim  :  1945  MRN:  [id number]    Indications:   Personal history of colon polyps (screening only); + FOBT     : Roque Knox MD    Referring Provider:  Ehsan Ramírez MD    Medicines:  MAC anesthesia      Procedure Details:  After informed consent was obtained with all risks and benefits of the procedure explained and preprocedure exam completed, the patient was placed in the left lateral decubitus position. Universal protocol for patient identification was performed and documented in the nursing notes. Throughout the procedure, the patient's blood pressure was monitored at least every five minutes; pulse, and oxygen saturations were monitored continuously. All vital signs were documented in the nursing notes. A digital rectal exam was performed and was normal.  The Olympus videocolonoscope  was inserted in the rectum and carefully advanced to the transverse colon. The colonoscope was slowly withdrawn with careful evaluation between folds. Retroflexion in the rectum was performed; findings and interventions are described below. Procedure start time, extent reached time/cecum time, and procedure end time are documented in the nursing notes. The quality of preparation was good. Findings:   Rectum: normal  Sigmoid:     - Diverticulosis-severe with narrowing. Unable to pass with the pediatric colonoscope so switched to upper endoscope.   Descending Colon:     - Diverticulosis  Transverse Colon: normal-believe that I reached the proximal transverse  Ascending Colon: not intubated  Cecum: not intubated    Interventions:    none    Specimens:     ID Type Source Tests Collected by Time Destination   1 : Duodenum BX Preservative   Roque Knox MD 2018 2901 Pathology   2 : Gastric BX Preservative   Roque Knox MD 2/16/2018 2004 Pathology       EBL:  None. Complications:   No immediate complications     Impression:  -Incomplete colonoscopy as above. Recommendations:   No further colonoscopies unless clinical indication   Will schedule for CT colonography WITHOUT IV contrast  Resume Eliquis tomorrow if no signs of bleeding      Signed by:  Megan Mendieta MD          2/16/2018  10:20 AM

## 2018-02-16 NOTE — DISCHARGE INSTRUCTIONS
Joaquim Waldrop [facility] 912 Liliya Vines M.D.  [place] [address], [address]   [phone]                      EGD/COLONOSCOPY DISCHARGE INSTRUCTIONS    Candida Weathers  [id number]  1945    DISCOMFORT:  Redness at IV site- apply warm compress to area; if redness or soreness persist- contact your physician  There may be a slight amount of blood passed from the rectum  Gaseous discomfort- walking, belching will help relieve any discomfort  You may not operate a vehicle for 12 hours  You may not  engage in an occupation involving machinery or appliances for rest of today  You may not  drink alcoholic beverages for at least 12 hours  Avoid making any critical decisions for at least 24 hour    DIET:   You may resume your normal diet, but some patients find that heavy or large  meals may lead to indigestion or vomiting. I suggest a light meal as first food  Intake. I recommend a whole food, plant-based diet for your overall health. ACTIVITY:  You may resume your normal daily activities. It is recommended that you spend the remainder of the day resting - avoid any strenuous activity. CALL M.D. ANY SIGN OF   Increasing pain, nausea, vomiting  Abdominal distension (swelling)  New increased bleeding (oral or rectal)  Fever (chills)  Pain in chest area  Bloody discharge from nose or mouth  Shortness of breath    Additional Instructions:   Call Dr. Liliya Vines if any questions or problems at [phone]   You should receive the biopsy results by phone or mail within 3 weeks, if not, call my office for the results. EGD showed hiatal hernia, gastritis and duodenitis. Colonoscopy showed diverticulosis. Unable to see the whole colon. You will get a call by U to setup for CT colonography to evaluate the whole colon. Resume Eliquis tomorrow if no signs of bleeding. Gastritis: Care Instructions  Your Care Instructions    Gastritis is a sore and upset stomach.  It happens when something irritates the stomach lining. Many things can cause it. These include an infection such as the flu or something you ate or drank. Medicines or a sore on the lining of the stomach (ulcer) also can cause it. Your belly may bloat and ache. You may belch, vomit, and feel sick to your stomach. You should be able to relieve the problem by taking medicine. And it may help to change your diet. If gastritis lasts, your doctor may prescribe medicine. Follow-up care is a key part of your treatment and safety. Be sure to make and go to all appointments, and call your doctor if you are having problems. It's also a good idea to know your test results and keep a list of the medicines you take. How can you care for yourself at home? · If your doctor prescribed antibiotics, take them as directed. Do not stop taking them just because you feel better. You need to take the full course of antibiotics. · Be safe with medicines. If your doctor prescribed medicine to decrease stomach acid, take it as directed. Call your doctor if you think you are having a problem with your medicine. · Do not take any other medicine, including over-the-counter pain relievers, without talking to your doctor first.  · If your doctor recommends over-the-counter medicine to reduce stomach acid, such as Pepcid AC, Prilosec, Tagamet HB, or Zantac 75, follow the directions on the label. · Drink plenty of fluids (enough so that your urine is light yellow or clear like water) to prevent dehydration. Choose water and other caffeine-free clear liquids. If you have kidney, heart, or liver disease and have to limit fluids, talk with your doctor before you increase the amount of fluids you drink. · Limit how much alcohol you drink. · Avoid coffee, tea, cola drinks, chocolate, and other foods with caffeine. They increase stomach acid. When should you call for help? Call 911 anytime you think you may need emergency care.  For example, call if:  ? · You vomit blood or what looks like coffee grounds. ? · You pass maroon or very bloody stools. ?Call your doctor now or seek immediate medical care if:  ? · You start breathing fast and have not produced urine in the last 8 hours. ? · You cannot keep fluids down. ? Watch closely for changes in your health, and be sure to contact your doctor if:  ? · You do not get better as expected. Where can you learn more? Go to http://yue-fitz.info/. Enter [id number] in the search box to learn more about \"Gastritis: Care Instructions. \"  Current as of: May 12, 2017  Content Version: 11.4  © 6978-1226 [employer]. Care instructions adapted under license by [employer] (which disclaims liability or warranty for this information). If you have questions about a medical condition or this instruction, always ask your healthcare professional. [place] [address] any warranty or liability for your use of this information. Hiatal Hernia: Care Instructions  Your Care Instructions  A hiatal hernia occurs when part of the stomach bulges into the chest cavity. A hiatal hernia may allow stomach acid and juices to back up into the esophagus (acid reflux). This can cause a feeling of burning, warmth, heat, or pain behind the breastbone. This feeling may often occur after you eat, soon after you lie down, or when you bend forward, and it may come and go. You also may have a sour taste in your mouth. These symptoms are commonly known as heartburn or reflux. But not all hiatal hernias cause symptoms. Follow-up care is a key part of your treatment and safety. Be sure to make and go to all appointments, and call your doctor if you are having problems. It's also a good idea to know your test results and keep a list of the medicines you take. How can you care for yourself at home? · Take your medicines exactly as prescribed.  Call your doctor if you think you are having a problem with your medicine. · Do not take aspirin or other nonsteroidal anti-inflammatory drugs (NSAIDs), such as ibuprofen (Advil, Motrin) or naproxen (Aleve), unless your doctor says it is okay. Ask your doctor what you can take for pain. · Your doctor may recommend over-the-counter medicine. For mild or occasional indigestion, antacids such as Tums, Gaviscon, Maalox, or Mylanta may help. Your doctor also may recommend over-the-counter acid reducers, such as famotidine (Pepcid AC), cimetidine (Tagamet HB), ranitidine (Zantac 75 and Zantac 150), or omeprazole (Prilosec). Read and follow all instructions on the label. If you use these medicines often, talk with your doctor. · Change your eating habits. ¨ It's best to eat several small meals instead of two or three large meals. ¨ After you eat, wait 2 to 3 hours before you lie down. Late-night snacks aren't a good idea. ¨ Chocolate, mint, and alcohol can make heartburn worse. They relax the valve between the esophagus and the stomach. ¨ Spicy foods, foods that have a lot of acid (like tomatoes and oranges), and coffee can make heartburn symptoms worse in some people. If your symptoms are worse after you eat a certain food, you may want to stop eating that food to see if your symptoms get better. · Do not smoke or chew tobacco.  · If you get heartburn at night, raise the head of your bed 6 to 8 inches by putting the frame on blocks or placing a foam wedge under the head of your mattress. (Adding extra pillows does not work.)  · Do not wear tight clothing around your middle. · Lose weight if you need to. Losing just 5 to 10 pounds can help. When should you call for help? Call your doctor now or seek immediate medical care if:  ? · You have new or worse belly pain. ? · You are vomiting. ? Watch closely for changes in your health, and be sure to contact your doctor if:  ? · You have new or worse symptoms of indigestion. ? · You have trouble or pain swallowing.    ? · You are losing weight. ? · You do not get better as expected. Where can you learn more? Go to http://yue-fitz.info/. Enter N460 in the search box to learn more about \"Hiatal Hernia: Care Instructions. \"  Current as of: May 12, 2017  Content Version: 11.4  © 2650-2079 [employer]. Care instructions adapted under license by [employer] (which disclaims liability or warranty for this information). If you have questions about a medical condition or this instruction, always ask your healthcare professional. [address] any warranty or liability for your use of this information.

## 2018-02-16 NOTE — PROCEDURES
Joaquim Castro 912 Zayra Vergara M.D.  [facility], [address] [place], [place] [address]y  [phone]               Esophagogastroduodenoscopy (EGD) Procedure Note    NAME: Miladys Carpenter  :  1945  MRN:  [id number]    Indications:  Occult blood in stool with possible UGI origin; anemia     : Randy Britt MD    Referring Provider:  David Ocampo MD    Medicine:  [facility] anesthesia      Procedure Details:  After informed consent was obtained with all risks and benefits of the procedure explained and preprocedure exam completed, the patient was placed in the left lateral decubitus position. Universal protocol for patient identification was performed and documented in the nursing notes. Throughout the procedure, the patient's blood pressure was monitored at least every five minutes; pulse, and oxygen saturations were monitored continuously. All vital signs were documented in the nursing notes. The endoscope was inserted into the mouth and advanced under direct vision to second portion of the duodenum. A careful inspection was made as the gastroscope was withdrawn, including a retroflexed view of the proximal stomach; findings and interventions are described below. Findings:   Esophagus:normal  Stomach: moderate hiatal hernia, moderate erythema throughout the stomach s/p biopsies  Duodenum: patchy erythema in the bulb and second portion of the duodenum s/p biopsies    Interventions:    biopsy of stomach whole and duodenum    Specimens:     ID Type Source Tests Collected by Time Destination   1 : Duodenum BX Preservative   Randy Britt MD 2018 6190 Pathology   2 : Gastric BX Preservative   Randy Britt MD 2018 8383 Pathology        EBL: None          Complications:     No immediate complications        Impression:  -As above. Recommendations:  -Await pathology. Signed by:  Randy Britt MD         2018 10:16 AM

## 2018-02-16 NOTE — H&P
[address], [address] [place] [address]          Pre-procedure History and Physical       NAME:  Vik Valenzuela   :   1945   MRN:   [id number]     CHIEF COMPLAINT/HPI: See procedure note    PMH:  Past Medical History:   Diagnosis Date    Arthritis     CAD (coronary artery disease)     [facility]     Hypertension     Stroke ([facility]        PSH:  Past Surgical History:   Procedure Laterality Date    HX GYN      HX ORTHOPAEDIC      right knee replacement       Allergies:  No Known Allergies    Home Medications:  Prior to Admission Medications   Prescriptions Last Dose Informant Patient Reported? Taking? DULoxetine (CYMBALTA) 60 mg capsule 2/15/2018 at Unknown time  Yes Yes   Sig: Take 60 mg by mouth daily. FOLIC ACID/MV,FE,OTHER MIN (CENTRUM COMPLETE PO) 2/15/2018 at Unknown time  Yes Yes   Sig: Take  by mouth. HYDROcodone-acetaminophen (NORCO) 7.5-325 mg per tablet 2/15/2018 at Unknown time  No Yes   Sig: Take 1 Tab by mouth every four (4) hours as needed. Max Daily Amount: 6 Tabs. amLODIPine (NORVASC) 5 mg tablet 2/15/2018 at Unknown time  Yes Yes   Sig: Take 5 mg by mouth daily. apixaban (ELIQUIS) 5 mg tablet 2018 at Unknown time  No Yes   Sig: Take 1 Tab by mouth every twelve (12) hours. aspirin delayed-release 81 mg tablet 2018 at Unknown time  No Yes   Sig: Take 1 Tab by mouth daily. atorvastatin (LIPITOR) 40 mg tablet 2/15/2018 at Unknown time  Yes Yes   bumetanide (BUMEX) 2 mg tablet 2/15/2018 at Unknown time  Yes Yes   Sig: Take 2 mg by mouth daily. Indications: EDEMA   cholecalciferol (VITAMIN D3) 1,000 unit cap 2/15/2018 at Unknown time  Yes Yes   Sig: Take 1,000 Units by mouth daily. insulin lispro protamine/insulin lispro (HUMALOG MIX 75-25) 100 unit/mL (75-25) injection 2/15/2018 at Unknown time Self Yes Yes   Si Units by SubCUTAneous route two (2) times a day.    pioglitazone (ACTOS) 30 mg tablet 2/15/2018 at Unknown time  Yes Yes   Sig: Take 15 mg by mouth daily. Facility-Administered Medications: None       Hospital Medications:  Current Facility-Administered Medications   Medication Dose Route Frequency    0.9% sodium chloride infusion  150 mL/hr IntraVENous CONTINUOUS    sodium chloride (NS) flush 5-10 mL  5-10 mL IntraVENous Q8H    sodium chloride (NS) flush 5-10 mL  5-10 mL IntraVENous PRN    midazolam (VERSED) injection 0.25-5 mg  0.25-5 mg IntraVENous Multiple    simethicone (MYLICON) 86OP/5.5FF oral drops 80 mg  1.2 mL Oral Multiple    atropine injection 0.5 mg  0.5 mg IntraVENous ONCE PRN    EPINEPHrine (ADRENALIN) 0.1 mg/mL syringe 1 mg  1 mg Endoscopically ONCE PRN    dextrose (D50W) injection syrg 25 g  25 g IntraVENous PRN    dextrose (D50W) 50% injection syrg        ePHEDrine (MISTOLE) 50 mg/mL injection         Facility-Administered Medications Ordered in Other Encounters   Medication Dose Route Frequency    0.9% sodium chloride infusion   IntraVENous CONTINUOUS       Family History:  History reviewed. No pertinent family history. Social History:  Social History   Substance Use Topics    Smoking status: Never Smoker    Smokeless tobacco: Never Used    Alcohol use Yes      Comment: rarely          PHYSICAL EXAM PRIOR TO SEDATION:  General: Alert, in no acute distress    Lungs:            CTA bilaterally  Heart:  Normal S1, S2    Abdomen: Soft, Non distended, Non tender. Normoactive bowel sounds. Assessment:   Stable for sedation administration.   Date of last colonoscopy: 4 days, Polyps  Yes    Plan:   · Endoscopic procedure with sedation     Signed By: Jun Becerra MD     2/16/2018  9:25 AM

## 2018-02-16 NOTE — ANESTHESIA PREPROCEDURE EVALUATION
Anesthetic History   No history of anesthetic complications            Review of Systems / Medical History  Patient summary reviewed, nursing notes reviewed and pertinent labs reviewed    Pulmonary  Within defined limits                 Neuro/Psych       CVA  TIA     Cardiovascular    Hypertension          CAD         GI/Hepatic/Renal  Within defined limits              Endo/Other    Diabetes    Morbid obesity and arthritis     Other Findings              Physical Exam    Airway  Mallampati: II  TM Distance: > 6 cm  Neck ROM: normal range of motion   Mouth opening: Normal     Cardiovascular  Regular rate and rhythm,  S1 and S2 normal,  no murmur, click, rub, or gallop             Dental  No notable dental hx       Pulmonary  Breath sounds clear to auscultation               Abdominal  GI exam deferred       Other Findings            Anesthetic Plan    ASA: 3  Anesthesia type: MAC            Anesthetic plan and risks discussed with: Patient

## 2018-09-04 ENCOUNTER — HOSPITAL ENCOUNTER (OUTPATIENT)
Age: 73
Setting detail: OBSERVATION
Discharge: HOME HEALTH | End: 2018-09-05
Attending: EMERGENCY MEDICINE | Admitting: GENERAL ACUTE CARE HOSPITAL
Payer: MEDICARE

## 2018-09-04 ENCOUNTER — APPOINTMENT (OUTPATIENT)
Dept: CT IMAGING | Age: 73
End: 2018-09-04
Attending: EMERGENCY MEDICINE
Payer: MEDICARE

## 2018-09-04 ENCOUNTER — APPOINTMENT (OUTPATIENT)
Dept: MRI IMAGING | Age: 73
End: 2018-09-04
Attending: GENERAL ACUTE CARE HOSPITAL
Payer: MEDICARE

## 2018-09-04 DIAGNOSIS — R29.898 WEAKNESS OF LEFT LOWER EXTREMITY: Primary | ICD-10-CM

## 2018-09-04 DIAGNOSIS — Z86.73 HISTORY OF CVA (CEREBROVASCULAR ACCIDENT): ICD-10-CM

## 2018-09-04 DIAGNOSIS — I65.23 BILATERAL CAROTID ARTERY STENOSIS: ICD-10-CM

## 2018-09-04 DIAGNOSIS — I67.89 CEREBRAL MICROVASCULAR DISEASE: ICD-10-CM

## 2018-09-04 DIAGNOSIS — E11.42 TYPE 2 DIABETES MELLITUS WITH DIABETIC POLYNEUROPATHY, WITHOUT LONG-TERM CURRENT USE OF INSULIN (HCC): ICD-10-CM

## 2018-09-04 DIAGNOSIS — E78.01 FAMILIAL HYPERCHOLESTEROLEMIA: ICD-10-CM

## 2018-09-04 DIAGNOSIS — I10 ESSENTIAL HYPERTENSION: ICD-10-CM

## 2018-09-04 DIAGNOSIS — I63.9 ACUTE CVA (CEREBROVASCULAR ACCIDENT) (HCC): ICD-10-CM

## 2018-09-04 LAB
ALBUMIN SERPL-MCNC: 3.6 G/DL (ref 3.5–5)
ALBUMIN/GLOB SERPL: 1 {RATIO} (ref 1.1–2.2)
ALP SERPL-CCNC: 118 U/L (ref 45–117)
ALT SERPL-CCNC: 39 U/L (ref 12–78)
ANION GAP SERPL CALC-SCNC: 7 MMOL/L (ref 5–15)
APPEARANCE UR: CLEAR
APTT PPP: 21 SEC (ref 22.1–32)
AST SERPL-CCNC: 29 U/L (ref 15–37)
BACTERIA URNS QL MICRO: NEGATIVE /HPF
BASOPHILS # BLD: 0 K/UL (ref 0–0.1)
BASOPHILS NFR BLD: 0 % (ref 0–1)
BILIRUB SERPL-MCNC: 0.5 MG/DL (ref 0.2–1)
BILIRUB UR QL: NEGATIVE
BUN SERPL-MCNC: 58 MG/DL (ref 6–20)
BUN/CREAT SERPL: 17 (ref 12–20)
CALCIUM SERPL-MCNC: 8.9 MG/DL (ref 8.5–10.1)
CHLORIDE SERPL-SCNC: 106 MMOL/L (ref 97–108)
CO2 SERPL-SCNC: 31 MMOL/L (ref 21–32)
COLOR UR: ABNORMAL
COMMENT, HOLDF: NORMAL
CREAT SERPL-MCNC: 3.5 MG/DL (ref 0.55–1.02)
DIFFERENTIAL METHOD BLD: ABNORMAL
EOSINOPHIL # BLD: 0.1 K/UL (ref 0–0.4)
EOSINOPHIL NFR BLD: 1 % (ref 0–7)
EPITH CASTS URNS QL MICRO: ABNORMAL /LPF
ERYTHROCYTE [DISTWIDTH] IN BLOOD BY AUTOMATED COUNT: 18.1 % (ref 11.5–14.5)
GLOBULIN SER CALC-MCNC: 3.7 G/DL (ref 2–4)
GLUCOSE BLD STRIP.AUTO-MCNC: 283 MG/DL (ref 65–100)
GLUCOSE BLD STRIP.AUTO-MCNC: 85 MG/DL (ref 65–100)
GLUCOSE BLD STRIP.AUTO-MCNC: 95 MG/DL (ref 65–100)
GLUCOSE SERPL-MCNC: 116 MG/DL (ref 65–100)
GLUCOSE UR STRIP.AUTO-MCNC: NEGATIVE MG/DL
HCT VFR BLD AUTO: 36.8 % (ref 35–47)
HGB BLD-MCNC: 11.9 G/DL (ref 11.5–16)
HGB UR QL STRIP: NEGATIVE
HYALINE CASTS URNS QL MICRO: ABNORMAL /LPF (ref 0–5)
IMM GRANULOCYTES # BLD: 0 K/UL (ref 0–0.04)
IMM GRANULOCYTES NFR BLD AUTO: 0 % (ref 0–0.5)
INR PPP: 1 (ref 0.9–1.1)
KETONES UR QL STRIP.AUTO: NEGATIVE MG/DL
LEUKOCYTE ESTERASE UR QL STRIP.AUTO: NEGATIVE
LYMPHOCYTES # BLD: 0.6 K/UL (ref 0.8–3.5)
LYMPHOCYTES NFR BLD: 13 % (ref 12–49)
MCH RBC QN AUTO: 27.7 PG (ref 26–34)
MCHC RBC AUTO-ENTMCNC: 32.3 G/DL (ref 30–36.5)
MCV RBC AUTO: 85.6 FL (ref 80–99)
MONOCYTES # BLD: 0.5 K/UL (ref 0–1)
MONOCYTES NFR BLD: 10 % (ref 5–13)
NEUTS SEG # BLD: 3.7 K/UL (ref 1.8–8)
NEUTS SEG NFR BLD: 76 % (ref 32–75)
NITRITE UR QL STRIP.AUTO: NEGATIVE
NRBC # BLD: 0 K/UL (ref 0–0.01)
NRBC BLD-RTO: 0 PER 100 WBC
PH UR STRIP: 7 [PH] (ref 5–8)
PLATELET # BLD AUTO: 238 K/UL (ref 150–400)
PMV BLD AUTO: 12.6 FL (ref 8.9–12.9)
POTASSIUM SERPL-SCNC: 3.9 MMOL/L (ref 3.5–5.1)
PROT SERPL-MCNC: 7.3 G/DL (ref 6.4–8.2)
PROT UR STRIP-MCNC: 30 MG/DL
PROTHROMBIN TIME: 10.3 SEC (ref 9–11.1)
RBC # BLD AUTO: 4.3 M/UL (ref 3.8–5.2)
RBC #/AREA URNS HPF: ABNORMAL /HPF (ref 0–5)
RBC MORPH BLD: ABNORMAL
SAMPLES BEING HELD,HOLD: NORMAL
SERVICE CMNT-IMP: ABNORMAL
SERVICE CMNT-IMP: NORMAL
SERVICE CMNT-IMP: NORMAL
SODIUM SERPL-SCNC: 144 MMOL/L (ref 136–145)
SP GR UR REFRACTOMETRY: 1.01 (ref 1–1.03)
THERAPEUTIC RANGE,PTTT: ABNORMAL SECS (ref 58–77)
UROBILINOGEN UR QL STRIP.AUTO: 0.2 EU/DL (ref 0.2–1)
WBC # BLD AUTO: 4.9 K/UL (ref 3.6–11)
WBC URNS QL MICRO: ABNORMAL /HPF (ref 0–4)

## 2018-09-04 PROCEDURE — 85025 COMPLETE CBC W/AUTO DIFF WBC: CPT | Performed by: EMERGENCY MEDICINE

## 2018-09-04 PROCEDURE — 82962 GLUCOSE BLOOD TEST: CPT

## 2018-09-04 PROCEDURE — 96360 HYDRATION IV INFUSION INIT: CPT

## 2018-09-04 PROCEDURE — 85610 PROTHROMBIN TIME: CPT | Performed by: EMERGENCY MEDICINE

## 2018-09-04 PROCEDURE — 74011250636 HC RX REV CODE- 250/636: Performed by: EMERGENCY MEDICINE

## 2018-09-04 PROCEDURE — 80053 COMPREHEN METABOLIC PANEL: CPT | Performed by: EMERGENCY MEDICINE

## 2018-09-04 PROCEDURE — 74011636637 HC RX REV CODE- 636/637: Performed by: GENERAL ACUTE CARE HOSPITAL

## 2018-09-04 PROCEDURE — 99218 HC RM OBSERVATION: CPT

## 2018-09-04 PROCEDURE — 70551 MRI BRAIN STEM W/O DYE: CPT

## 2018-09-04 PROCEDURE — 99285 EMERGENCY DEPT VISIT HI MDM: CPT

## 2018-09-04 PROCEDURE — 81001 URINALYSIS AUTO W/SCOPE: CPT | Performed by: EMERGENCY MEDICINE

## 2018-09-04 PROCEDURE — 70450 CT HEAD/BRAIN W/O DYE: CPT

## 2018-09-04 PROCEDURE — 74011250637 HC RX REV CODE- 250/637: Performed by: GENERAL ACUTE CARE HOSPITAL

## 2018-09-04 PROCEDURE — 85730 THROMBOPLASTIN TIME PARTIAL: CPT | Performed by: EMERGENCY MEDICINE

## 2018-09-04 PROCEDURE — 36415 COLL VENOUS BLD VENIPUNCTURE: CPT | Performed by: EMERGENCY MEDICINE

## 2018-09-04 RX ORDER — MAGNESIUM SULFATE 100 %
4 CRYSTALS MISCELLANEOUS AS NEEDED
Status: DISCONTINUED | OUTPATIENT
Start: 2018-09-04 | End: 2018-09-05 | Stop reason: HOSPADM

## 2018-09-04 RX ORDER — INSULIN LISPRO 100 [IU]/ML
INJECTION, SOLUTION INTRAVENOUS; SUBCUTANEOUS
Status: DISCONTINUED | OUTPATIENT
Start: 2018-09-04 | End: 2018-09-05 | Stop reason: HOSPADM

## 2018-09-04 RX ORDER — DEXTROSE 50 % IN WATER (D50W) INTRAVENOUS SYRINGE
12.5-25 AS NEEDED
Status: DISCONTINUED | OUTPATIENT
Start: 2018-09-04 | End: 2018-09-05 | Stop reason: HOSPADM

## 2018-09-04 RX ORDER — TORSEMIDE 100 MG/1
100 TABLET ORAL DAILY
Status: ON HOLD | COMMUNITY
End: 2019-11-16 | Stop reason: SDUPTHER

## 2018-09-04 RX ORDER — PIOGLITAZONEHYDROCHLORIDE 15 MG/1
15 TABLET ORAL DAILY
COMMUNITY

## 2018-09-04 RX ORDER — DULOXETIN HYDROCHLORIDE 30 MG/1
60 CAPSULE, DELAYED RELEASE ORAL DAILY
Status: DISCONTINUED | OUTPATIENT
Start: 2018-09-05 | End: 2018-09-05 | Stop reason: HOSPADM

## 2018-09-04 RX ORDER — ASPIRIN AND DIPYRIDAMOLE 25; 200 MG/1; MG/1
1 CAPSULE, EXTENDED RELEASE ORAL 2 TIMES DAILY
COMMUNITY

## 2018-09-04 RX ORDER — CLONIDINE HYDROCHLORIDE 0.1 MG/1
0.1 TABLET ORAL 2 TIMES DAILY
COMMUNITY

## 2018-09-04 RX ORDER — AMLODIPINE BESYLATE 10 MG/1
10 TABLET ORAL DAILY
COMMUNITY

## 2018-09-04 RX ORDER — LANOLIN ALCOHOL/MO/W.PET/CERES
325 CREAM (GRAM) TOPICAL
Status: DISCONTINUED | OUTPATIENT
Start: 2018-09-05 | End: 2018-09-05 | Stop reason: HOSPADM

## 2018-09-04 RX ORDER — THERA TABS 400 MCG
1 TAB ORAL DAILY
Status: DISCONTINUED | OUTPATIENT
Start: 2018-09-05 | End: 2018-09-05 | Stop reason: HOSPADM

## 2018-09-04 RX ORDER — ACETAMINOPHEN 325 MG/1
650 TABLET ORAL
Status: DISCONTINUED | OUTPATIENT
Start: 2018-09-04 | End: 2018-09-05 | Stop reason: HOSPADM

## 2018-09-04 RX ORDER — EZETIMIBE 10 MG/1
10 TABLET ORAL DAILY
Status: DISCONTINUED | OUTPATIENT
Start: 2018-09-05 | End: 2018-09-05 | Stop reason: HOSPADM

## 2018-09-04 RX ORDER — SODIUM CHLORIDE 9 MG/ML
150 INJECTION, SOLUTION INTRAVENOUS ONCE
Status: COMPLETED | OUTPATIENT
Start: 2018-09-04 | End: 2018-09-04

## 2018-09-04 RX ORDER — SODIUM CHLORIDE 0.9 % (FLUSH) 0.9 %
5-10 SYRINGE (ML) INJECTION AS NEEDED
Status: DISCONTINUED | OUTPATIENT
Start: 2018-09-04 | End: 2018-09-05 | Stop reason: HOSPADM

## 2018-09-04 RX ORDER — AMLODIPINE BESYLATE 5 MG/1
5 TABLET ORAL DAILY
Status: DISCONTINUED | OUTPATIENT
Start: 2018-09-05 | End: 2018-09-05 | Stop reason: HOSPADM

## 2018-09-04 RX ORDER — LANOLIN ALCOHOL/MO/W.PET/CERES
1000 CREAM (GRAM) TOPICAL DAILY
COMMUNITY

## 2018-09-04 RX ORDER — SODIUM CHLORIDE 0.9 % (FLUSH) 0.9 %
5-10 SYRINGE (ML) INJECTION EVERY 8 HOURS
Status: DISCONTINUED | OUTPATIENT
Start: 2018-09-04 | End: 2018-09-05 | Stop reason: HOSPADM

## 2018-09-04 RX ORDER — ACETAMINOPHEN 650 MG/1
650 SUPPOSITORY RECTAL
Status: DISCONTINUED | OUTPATIENT
Start: 2018-09-04 | End: 2018-09-05 | Stop reason: HOSPADM

## 2018-09-04 RX ORDER — LANOLIN ALCOHOL/MO/W.PET/CERES
325 CREAM (GRAM) TOPICAL 2 TIMES DAILY
COMMUNITY

## 2018-09-04 RX ORDER — TORSEMIDE 20 MG/1
100 TABLET ORAL DAILY
Status: DISCONTINUED | OUTPATIENT
Start: 2018-09-05 | End: 2018-09-05 | Stop reason: HOSPADM

## 2018-09-04 RX ORDER — ASPIRIN AND DIPYRIDAMOLE 25; 200 MG/1; MG/1
1 CAPSULE, EXTENDED RELEASE ORAL 2 TIMES DAILY
Status: DISCONTINUED | OUTPATIENT
Start: 2018-09-04 | End: 2018-09-05 | Stop reason: HOSPADM

## 2018-09-04 RX ORDER — EZETIMIBE 10 MG/1
10 TABLET ORAL DAILY
COMMUNITY

## 2018-09-04 RX ADMIN — SODIUM CHLORIDE 150 ML/HR: 900 INJECTION, SOLUTION INTRAVENOUS at 14:07

## 2018-09-04 RX ADMIN — Medication 10 ML: at 16:54

## 2018-09-04 RX ADMIN — Medication 10 ML: at 21:58

## 2018-09-04 RX ADMIN — ASPIRIN AND EXTENDED-RELEASE DIPYRIDAMOLE 1 CAPSULE: 25; 200 CAPSULE ORAL at 21:51

## 2018-09-04 RX ADMIN — INSULIN LISPRO 3 UNITS: 100 INJECTION, SOLUTION INTRAVENOUS; SUBCUTANEOUS at 21:57

## 2018-09-04 NOTE — IP AVS SNAPSHOT
[address] [facility] 
[phone] Patient: Lamonte Monreal MRN: [id number]:[id number] About your hospitalization You were admitted on:  September 4, 2018 You last received care in the:  [facility] 3 NEUROSCIENCE TELEMETRY You were discharged on:  September 5, 2018 Why you were hospitalized Your primary diagnosis was:  Not on File Your diagnoses also included:  Weakness Of Left Leg Follow-up Information Follow up With Details Comments Contact Info Abena Arshad MD On 9/12/2018 your appt is at 9:45am  [facility] [address] [facility] [employer] 
[phone] [address]  patient wants to make her own appt at the [facility] location  [address] 
[phone] Discharge Orders None A check nataliya indicates which time of day the medication should be taken. My Medications CHANGE how you take these medications Instructions Each Dose to Equal  
 Morning Noon Evening Bedtime ACTOS 15 mg tablet Generic drug:  pioglitazone What changed:  Another medication with the same name was removed. Continue taking this medication, and follow the directions you see here. Your last dose was: Your next dose is: Take 15 mg by mouth daily. 15 mg  
    
   
   
   
  
 amLODIPine 10 mg tablet Commonly known as:  Nichole Darting What changed:  Another medication with the same name was removed. Continue taking this medication, and follow the directions you see here. Your last dose was: Your next dose is: Take 10 mg by mouth daily. 10 mg CONTINUE taking these medications Instructions Each Dose to Equal  
 Morning Noon Evening Bedtime  
 aspirin-dipyridamole  mg per SR capsule Commonly known as:  AGGRENOX Your last dose was: Your next dose is: Take 1 Cap by mouth two (2) times a day. 1 Cap  
    
   
   
   
  
 atorvastatin 40 mg tablet Commonly known as:  LIPITOR Your last dose was: Your next dose is: Take 40 mg by mouth daily. 40 mg CENTRUM COMPLETE PO Your last dose was: Your next dose is: Take 1 Tab by mouth daily. 1 Tab  
    
   
   
   
  
 cloNIDine HCl 0.1 mg tablet Commonly known as:  CATAPRES Your last dose was: Your next dose is: Take 0.1 mg by mouth two (2) times a day. 0.1 mg  
    
   
   
   
  
 cyanocobalamin 1,000 mcg tablet Your last dose was: Your next dose is: Take 1,000 mcg by mouth daily. 1000 mcg DULoxetine 60 mg capsule Commonly known as:  CYMBALTA Your last dose was: Your next dose is: Take 60 mg by mouth daily. 60 mg  
    
   
   
   
  
 ezetimibe 10 mg tablet Commonly known as:  Levell Antes Your last dose was: Your next dose is: Take 10 mg by mouth daily. 10 mg  
    
   
   
   
  
 ferrous sulfate 325 mg (65 mg iron) tablet Your last dose was: Your next dose is: Take 325 mg by mouth two (2) times a day. 325 mg HumaLOG Mix 75-25(U-100)Insuln 100 unit/mL (75-25) injection Generic drug:  insulin lispro protamine/insulin lispro Your last dose was: Your next dose is:    
   
   
 24 Units by SubCUTAneous route two (2) times a day. 24 Units  
    
   
   
   
  
 torsemide 100 mg tablet Commonly known as:  DEMADEX Your last dose was: Your next dose is: Take 100 mg by mouth daily. 100 mg Discharge Instructions Transient Ischemic Attack: Care Instructions Your Care Instructions A transient ischemic attack (TIA) is when blood flow to a part of your brain is blocked for a short time. A TIA is like a stroke but usually lasts only a few minutes. A TIA does not cause lasting brain damage. Any vision problems, slurred speech, or other symptoms usually go away in 10 to 20 minutes. But they may last for up to 24 hours. TIAs are often warning signs of a stroke. Some people who have a TIA may have a stroke in the future. A stroke can cause symptoms like those of a TIA. But a stroke causes lasting damage to your brain. You can take steps to help prevent a stroke. One thing you can do is get early treatment. If you have other new symptoms, or if your symptoms do not get better, go back to the emergency room or call your doctor right away. Getting treatment right away may prevent long-term brain damage caused by a stroke. The doctor has checked you carefully, but problems can develop later. If you notice any problems or new symptoms, get medical treatment right away. Follow-up care is a key part of your treatment and safety. Be sure to make and go to all appointments, and call your doctor if you are having problems. It's also a good idea to know your test results and keep a list of the medicines you take. How can you care for yourself at home? Medicines 
  · Be safe with medicines. Take your medicines exactly as prescribed. Call your doctor if you think you are having a problem with your medicine.  
  · If you take a blood thinner, such as aspirin, be sure you get instructions about how to take your medicine safely. Blood thinners can cause serious bleeding problems.  
  · Call your doctor if you are not able to take your medicines for any reason.  
  · Do not take any over-the-counter medicines or herbal products without talking to your doctor first.  
  · If you take birth control pills or hormone therapy, talk to your doctor. Ask if these treatments are right for you.  
 Lifestyle changes   · Do not smoke. If you need help quitting, talk to your doctor about stop-smoking programs and medicines.  
  · Be active. If your doctor recommends it, get more exercise. Walking is a good choice. Bit by bit, increase the amount you walk every day. Try for at least 30 minutes on most days of the week. You also may want to swim, bike, or do other activities.  
  · Eat heart-healthy foods. These include fruits, vegetables, high-fiber foods, fish, and foods that are low in sodium, saturated fat, and trans fat.  
  · Stay at a healthy weight. Lose weight if you need to.  
  · Limit alcohol to 2 drinks a day for men and 1 drink a day for women.  
 Staying healthy 
  · Manage other health problems such as diabetes, high blood pressure, and high cholesterol.  
  · Get the flu vaccine every year. When should you call for help? Call 911 anytime you think you may need emergency care. For example, call if: 
  · You have new or worse symptoms of a stroke. These may include: 
¨ Sudden numbness, tingling, weakness, or loss of movement in your face, arm, or leg, especially on only one side of your body. ¨ Sudden vision changes. ¨ Sudden trouble speaking. ¨ Sudden confusion or trouble understanding simple statements. ¨ Sudden problems with walking or balance. ¨ A sudden, severe headache that is different from past headaches. Call 911 even if these symptoms go away in a few minutes.  
  · You feel like you are having another TIA.  
 Watch closely for changes in your health, and be sure to contact your doctor if you have any problems. Where can you learn more? Go to http://yue-fitz.info/. Enter ([id number] in the search box to learn more about \"Transient Ischemic Attack: Care Instructions. \" Current as of: November 21, 2017 Content Version: 11.7 © 5659-3017 [employer].  Care instructions adapted under license by [employer] (which disclaims liability or warranty for this information). If you have questions about a medical condition or this instruction, always ask your healthcare professional. [address] any warranty or liability for your use of this information. [employer] Announcement We are excited to announce that we are making your provider's discharge notes available to you in [employer]. You will see these notes when they are completed and signed by the physician that discharged you from your recent hospital stay. If you have any questions or concerns about any information you see in [employer], please call the Health Information Department where you were seen or reach out to your Primary Care Provider for more information about your plan of care. Introducing [facility]! Glenn Simons introduces [employer] patient portal. Now you can access parts of your medical record, email your doctor's office, and request medication refills online. 1. In your internet browser, go to [email or web address] 2. Click on the First Time User? Click Here link in the Sign In box. You will see the New Member Sign Up page. 3. Enter your [employer] Access Code exactly as it appears below. You will not need to use this code after youve completed the sign-up process. If you do not sign up before the expiration date, you must request a new code. · [employer] Access Code: [email or web address] Expires: 12/3/2018 12:35 PM 
 
4. Enter the last four digits of your Social Security Number (xxxx) and Date of Birth (mm/dd/yyyy) as indicated and click Submit. You will be taken to the next sign-up page. 5. Create a [employer] ID. This will be your [employer] login ID and cannot be changed, so think of one that is secure and easy to remember. 6. Create a [employer] password. You can change your password at any time. 7. Enter your Password Reset Question and Answer. This can be used at a later time if you forget your password. 8. Enter your e-mail address. You will receive e-mail notification when new information is available in [address] 9. Click Sign Up. You can now view and download portions of your medical record. 10. Click the Download Summary menu link to download a portable copy of your medical information. If you have questions, please visit the Frequently Asked Questions section of the [employer]-sh[employer] website. Remember, [employer] is NOT to be used for urgent needs. For medical emergencies, dial 911. Now available from your iPhone and Android! Introducing Darvin Richards As a [facility] patient, I wanted to make you aware of our electronic visit tool called Darvin Richards. Snapkin/7 allows you to connect within minutes with a medical provider 24 hours a day, seven days a week via a mobile device or tablet or logging into a secure website from your computer. You can access Darvin Richards from anywhere in the [place]. A virtual visit might be right for you when you have a simple condition and feel like you just dont want to get out of bed, or cant get away from work for an appointment, when your regular [facility] provider is not available (evenings, weekends or holidays), or when youre out of town and need minor care. Electronic visits cost only $49 and if the [employer] provider determines a prescription is needed to treat your condition, one can be electronically transmitted to a nearby pharmacy*. Please take a moment to enroll today if you have not already done so. The enrollment process is free and takes just a few minutes. To enroll, please download the [facility] leatha to your tablet or phone, or visit [email or web address] to enroll on your computer.    
And, as an [address] [place] [address] patient with a [employer] account, the results of your visits will be scanned into your electronic medical record and your primary care provider will be able to view the scanned results. We urge you to continue to see your regular Pallavi Montenegro provider for your ongoing medical care. And while your primary care provider may not be the one available when you seek a Darvin Ceballosfin virtual visit, the peace of mind you get from getting a real diagnosis real time can be priceless. For more information on [employer], view our Frequently Asked Questions (FAQs) at [email or web address]. Sincerely, 
 
Ike Oliver MD 
Chief Medical Officer 8 Avelina Vincent *:  certain medications cannot be prescribed via AcomplirodneyGliAffidabili.it Unresulted Labs-Please follow up with your PCP about these lab tests Order Current Status DUPLEX CAROTID BILATERAL Preliminary result Providers Seen During Your Hospitalization Provider Specialty Primary office phone Dario Thomas. Viktor Zeng MD Emergency Medicine [phone] Claus Jones MD Internal Medicine [phone] Your Primary Care Physician (PCP) Primary Care Physician Office Phone Office Fax Jennifer Pump [phone] You are allergic to the following No active allergies Recent Documentation Height Weight BMI OB Status Smoking Status 1.549 m 78 kg 32.5 kg/m2 Hysterectomy Never Smoker Emergency Contacts Name Discharge Info Relation Home Work Mobile Jasiel Moreira DISCHARGE CAREGIVER [3] Spouse [3] [phone] Patient Belongings The following personal items are in your possession at time of discharge: 
     Visual Aid: Glasses, With patient Please provide this summary of care documentation to your next provider. Signatures-by signing, you are acknowledging that this After Visit Summary has been reviewed with you and you have received a copy. Patient Signature:  ____________________________________________________________ Date:  ____________________________________________________________  
  
Scio Moulding Provider Signature:  ____________________________________________________________ Date:  ____________________________________________________________

## 2018-09-04 NOTE — ED PROVIDER NOTES
EMERGENCY DEPARTMENT HISTORY AND PHYSICAL EXAM 
 
 
Date: 9/4/2018 Patient Name: Carlos Castro History of Presenting Illness Chief Complaint Patient presents with  Extremity Weakness Pt states she woke up this morning with left leg weakness. History Provided By: Patient HPI: Carlos Castro, 72 y.o. female with PMHx significant for CAD, HTN, DM, and stroke, presents via EMS to the ED with cc of persistent tingling and weakness of the LLE upon waking at 0800 this morning. She states she was taking a shower when her leg began trembling and gave out from under her, causing pt to fall. She denies hitting her head or LOC. Pt denies any sx's in her upper extremities, confusion, or speech difficulty. Pt notes chronic blurred vision in the left eye from her prior stroke, however states this has worsened and she is having difficulty seeing. Pt denies any recent illness including nausea, vomiting, fever, chills, and headache. There are no other complaints, changes, or physical findings at this time. PCP: Liya Parisi MD 
 
Current Facility-Administered Medications Medication Dose Route Frequency Provider Last Rate Last Dose  [START ON 9/5/2018] amLODIPine (NORVASC) tablet 5 mg  5 mg Oral DAILY Geraldine Butler MD      
[facility] Chaoant Felty ON 9/5/2018] DULoxetine (CYMBALTA) capsule 60 mg  60 mg Oral DAILY Geraldine Butler MD      
[facility] Chaoant Felty ON 9/5/2018] ezetimibe (ZETIA) tablet 10 mg  10 mg Oral DAILY Geraldine Butler MD      
[facility] Stanley Felty ON 9/5/2018] ferrous sulfate tablet 325 mg  325 mg Oral ACB Geraldine Butler MD      
 [START ON 9/5/2018] therapeutic multivitamin [facility]) tablet 1 Tab  1 Tab Oral DAILY Geraldine Butler MD      
[facility] Chaoant Felty ON 9/5/2018] torsemide (DEMADEX) tablet 100 mg  100 mg Oral DAILY Geraldine Butler MD      
 aspirin-dipyridamole (AGGRENOX)  mg per capsule 1 Cap  1 Cap Oral BID Geraldine Butler MD      
 insulin lispro (HUMALOG) injection   SubCUTAneous AC&HS Geraldine Butler MD      
  glucose chewable tablet 16 g  4 Tab Oral PRN Nemesio Gutierrez MD      
 dextrose (D50W) injection syrg 12.5-25 g  12.5-25 g IntraVENous PRN Nemesio Gutierrez MD      
 glucagon (GLUCAGEN) injection 1 mg  1 mg IntraMUSCular PRN Nemesio Gutierrez MD      
 sodium chloride (NS) flush 5-10 mL  5-10 mL IntraVENous Q8H Nemesio Gutierrez MD      
 sodium chloride (NS) flush 5-10 mL  5-10 mL IntraVENous PRN Nemesio Gutierrez MD      
 acetaminophen (TYLENOL) tablet 650 mg  650 mg Oral Q4H PRN Nemesio Gutierrez MD      
 Or  
[facility] acetaminophen (TYLENOL) solution 650 mg  650 mg Per NG tube Q4H PRN Nemesio Gutierrez MD      
 Or  
[facility] acetaminophen (TYLENOL) suppository 650 mg  650 mg Rectal Q4H PRN Nemesio Gutierrez MD      
 
Current Outpatient Prescriptions Medication Sig Dispense Refill  ferrous sulfate 325 mg (65 mg iron) tablet Take 325 mg by mouth two (2) times a day.  ezetimibe (ZETIA) 10 mg tablet Take 10 mg by mouth daily.  torsemide (DEMADEX) 100 mg tablet Take 100 mg by mouth daily.  amLODIPine (NORVASC) 10 mg tablet Take 10 mg by mouth daily.  pioglitazone (ACTOS) 15 mg tablet Take 15 mg by mouth daily.  cloNIDine HCl (CATAPRES) 0.1 mg tablet Take 0.1 mg by mouth two (2) times a day.  cyanocobalamin 1,000 mcg tablet Take 1,000 mcg by mouth daily.  aspirin-dipyridamole (AGGRENOX)  mg per SR capsule Take 1 Cap by mouth two (2) times a day.  insulin lispro protamine/insulin lispro (HUMALOG MIX 75-25) 100 unit/mL (75-25) injection 24 Units by SubCUTAneous route two (2) times a day.  DULoxetine (CYMBALTA) 60 mg capsule Take 60 mg by mouth daily.  atorvastatin (LIPITOR) 40 mg tablet Take 40 mg by mouth daily.  FOLIC ACID/MV,FE,OTHER MIN (CENTRUM COMPLETE PO) Take 1 Tab by mouth daily. Past History Past Medical History: 
Past Medical History:  
Diagnosis Date  Arthritis  CAD (coronary artery disease)  Diabetes ([facility]  Hypertension  Stroke ([facility] Past Surgical History: 
Past Surgical History:  
Procedure Laterality Date  COLONOSCOPY N/A 2/16/2018 COLONOSCOPY performed by Waylon Luque MD at [facility] ENDOSCOPY  
 HX GYN    
 HX ORTHOPAEDIC    
 right knee replacement Family History: 
History reviewed. No pertinent family history. Social History: 
Social History Substance Use Topics  Smoking status: Never Smoker  Smokeless tobacco: Never Used  Alcohol use Yes Comment: rarely Allergies: 
No Known Allergies Review of Systems Review of Systems Constitutional: Negative for activity change, appetite change, chills, fever and unexpected weight change. HENT: Negative for congestion. Eyes: Positive for visual disturbance (left eye blurred). Negative for pain. Respiratory: Negative for cough and shortness of breath. Cardiovascular: Negative for chest pain. Gastrointestinal: Negative for abdominal pain, diarrhea, nausea and vomiting. Genitourinary: Negative for dysuria. Musculoskeletal: Negative for back pain. Skin: Negative for rash. Neurological: Positive for weakness (LLE). Negative for speech difficulty and headaches.  
     + tingling LLE 
- LOC Psychiatric/Behavioral: Negative for confusion. Physical Exam  
Physical Exam  
Constitutional: She is oriented to person, place, and time. She appears well-developed. Elderly overweight female in minimal distress HENT:  
Head: Normocephalic and atraumatic. Mouth/Throat: Oropharynx is clear and moist.  
Eyes: Conjunctivae and EOM are normal. Pupils are equal, round, and reactive to light. Right eye exhibits no discharge. Left eye exhibits no discharge. Neck: Normal range of motion. Neck supple. Cardiovascular: Normal rate and regular rhythm. 3/6 systolic murmur, loudest on the right, radiates into the carotid Pulmonary/Chest: Effort normal and breath sounds normal. No respiratory distress. She has no wheezes. She has no rales. Abdominal: Soft. Bowel sounds are normal. She exhibits no distension. There is no tenderness. Musculoskeletal: Normal range of motion. Mild lower extremity edema Neurological: She is alert and oriented to person, place, and time. No cranial nerve deficit. She exhibits normal muscle tone.  strength is symmetric. Negative pronator drift. BUE strength is 5/5, LLE strength is 3/5, and RLE strength is 5/5. Skin: Skin is warm and dry. No rash noted. She is not diaphoretic. Nursing note and vitals reviewed. Diagnostic Study Results Labs - Recent Results (from the past 12 hour(s)) GLUCOSE, POC Collection Time: 09/04/18 12:44 PM  
Result Value Ref Range Glucose (POC) 95 65 - 100 mg/dL Performed by Lewis Ramirez   
CBC WITH AUTOMATED DIFF Collection Time: 09/04/18 12:47 PM  
Result Value Ref Range WBC 4.9 3.6 - 11.0 K/uL  
 RBC 4.30 3.80 - 5.20 M/uL  
 HGB 11.9 11.5 - 16.0 g/dL HCT 36.8 35.0 - 47.0 % MCV 85.6 80.0 - 99.0 FL  
 MCH 27.7 26.0 - 34.0 PG  
 MCHC 32.3 30.0 - 36.5 g/dL  
 RDW 18.1 (H) 11.5 - 14.5 % PLATELET 002 140 - 522 K/uL MPV 12.6 8.9 - 12.9 FL  
 NRBC 0.0 0  WBC ABSOLUTE NRBC 0.00 0.00 - 0.01 K/uL NEUTROPHILS 76 (H) 32 - 75 % LYMPHOCYTES 13 12 - 49 % MONOCYTES 10 5 - 13 % EOSINOPHILS 1 0 - 7 % BASOPHILS 0 0 - 1 % IMMATURE GRANULOCYTES 0 0.0 - 0.5 % ABS. NEUTROPHILS 3.7 1.8 - 8.0 K/UL  
 ABS. LYMPHOCYTES 0.6 (L) 0.8 - 3.5 K/UL  
 ABS. MONOCYTES 0.5 0.0 - 1.0 K/UL  
 ABS. EOSINOPHILS 0.1 0.0 - 0.4 K/UL  
 ABS. BASOPHILS 0.0 0.0 - 0.1 K/UL  
 ABS. IMM. GRANS. 0.0 0.00 - 0.04 K/UL  
 DF AUTOMATED    
 RBC COMMENTS ANISOCYTOSIS 
1+ METABOLIC PANEL, COMPREHENSIVE Collection Time: 09/04/18 12:47 PM  
Result Value Ref Range Sodium 144 136 - 145 mmol/L Potassium 3.9 3.5 - 5.1 mmol/L Chloride 106 97 - 108 mmol/L  
 CO2 31 21 - 32 mmol/L Anion gap 7 5 - 15 mmol/L Glucose 116 (H) 65 - 100 mg/dL BUN 58 (H) 6 - 20 MG/DL Creatinine 3.50 (H) 0.55 - 1.02 MG/DL  
 BUN/Creatinine ratio 17 12 - 20 GFR est AA 16 (L) >60 ml/min/1.73m2 GFR est non-AA 13 (L) >60 ml/min/1.73m2 Calcium 8.9 8.5 - 10.1 MG/DL Bilirubin, total 0.5 0.2 - 1.0 MG/DL  
 ALT (SGPT) 39 12 - 78 U/L  
 AST (SGOT) 29 15 - 37 U/L Alk. phosphatase 118 (H) 45 - 117 U/L Protein, total 7.3 6.4 - 8.2 g/dL Albumin 3.6 3.5 - 5.0 g/dL Globulin 3.7 2.0 - 4.0 g/dL A-G Ratio 1.0 (L) 1.1 - 2.2 PROTHROMBIN TIME + INR Collection Time: 09/04/18 12:47 PM  
Result Value Ref Range INR 1.0 0.9 - 1.1 Prothrombin time 10.3 9.0 - 11.1 sec PTT Collection Time: 09/04/18 12:47 PM  
Result Value Ref Range aPTT 21.0 (L) 22.1 - 32.0 sec  
 aPTT, therapeutic range     58.0 - 77.0 SECS  
SAMPLES BEING HELD Collection Time: 09/04/18 12:47 PM  
Result Value Ref Range SAMPLES BEING HELD 1 RED 1 DRK GRN    
 COMMENT Add-on orders for these samples will be processed based on acceptable specimen integrity and analyte stability, which may vary by analyte. URINALYSIS W/ RFLX MICROSCOPIC Collection Time: 09/04/18  1:38 PM  
Result Value Ref Range Color YELLOW/STRAW Appearance CLEAR CLEAR Specific gravity 1.007 1.003 - 1.030    
 pH (UA) 7.0 5.0 - 8.0 Protein 30 (A) NEG mg/dL Glucose NEGATIVE  NEG mg/dL Ketone NEGATIVE  NEG mg/dL Bilirubin NEGATIVE  NEG Blood NEGATIVE  NEG Urobilinogen 0.2 0.2 - 1.0 EU/dL Nitrites NEGATIVE  NEG Leukocyte Esterase NEGATIVE  NEG    
 WBC 0-4 0 - 4 /hpf  
 RBC 0-5 0 - 5 /hpf Epithelial cells FEW FEW /lpf Bacteria NEGATIVE  NEG /hpf Hyaline cast 0-2 0 - 5 /lpf Radiologic Studies -  
CT Results  (Last 48 hours) 09/04/18 1258  CT CODE NEURO HEAD WO CONTRAST Final result  Impression:  IMPRESSION:   
   
Severe chronic microvascular ischemic change with areas of encephalomalacia in  
 the right frontal lobe, right occipital lobe and right basal ganglia consistent  
with remote infarction. Superimposed focus of acute ischemia cannot be excluded. Narrative:  EXAM:  CT CODE NEURO HEAD WO CONTRAST Clinical history: Left leg weakness INDICATION:   left leg weakness COMPARISON: 11/25/2016. CONTRAST:  None. TECHNIQUE: Unenhanced CT of the head was performed using 5 mm images. Brain and  
bone windows were generated. CT dose reduction was achieved through use of a  
standardized protocol tailored for this examination and automatic exposure  
control for dose modulation. FINDINGS:  
Extensive periventricular and scattered foci of increased T2 signal intensity  
and corona radiata and centrum semiovale. Moderate area of encephalomalacia in  
the posterior right frontal lobe consistent with remote infarction. Remote right  
basal ganglia infarction. Remote right frontal infarction. .   The basilar  
cisterns are open. No acute infarct is identified. The bone windows demonstrate  
no abnormalities. The visualized portions of the paranasal sinuses and mastoid  
air cells are clear. Medical Decision Making I am the first provider for this patient. I reviewed the vital signs, available nursing notes, past medical history, past surgical history, family history and social history. Vital Signs-Reviewed the patient's vital signs. Patient Vitals for the past 12 hrs: 
 Temp Pulse Resp BP SpO2  
09/04/18 1430 - 74 23 155/65 -  
09/04/18 1401 - 73 24 153/63 -  
09/04/18 1330 - 70 20 (!) 112/93 -  
09/04/18 1308 - - - 168/64 -  
09/04/18 1236 98.6 °F (37 °C) 88 16 174/72 100 % Pulse Oximetry Analysis - 100% on RA Cardiac Monitor:  
Rate: 88 bpm 
Rhythm: Normal Sinus Rhythm Records Reviewed: Nursing Notes, Old Medical Records, Previous electrocardiograms, Ambulance Run Sheet, Previous Radiology Studies and Previous Laboratory Studies Provider Notes (Medical Decision Making): Acute LLE weakness, unknown exact time of onset. Level 2 code S initiated by another physician in triage given unsure of exact onset (pt admits to slight confusion). Awaiting results. ED Course:  
Initial assessment performed. The patients presenting problems have been discussed, and they are in agreement with the care plan formulated and outlined with them. I have encouraged them to ask questions as they arise throughout their visit. 13:30 Results reviewed. Creatinine increased to 3.5, hold CTA. Will discuss with IM for admission and MRI evaluation. CONSULT NOTE:  
1:36 PM 
Ankita Morales MD spoke with Ermias Arriaga MD  
Specialty: Hospitalist 
Discussed pt's hx, disposition, and available diagnostic and imaging results. Reviewed care plans. Consultant will evaluate pt for admission. Written by Gerardo Lorenzo ED Scribe, as dictated by .Ciera Villagran. Critical Care Time: 0 Disposition: 
ADMIT NOTE: 
1:40 PM 
The patient is being admitted to the hospital by Ermias Arriaga MD.  The results of their tests and reasons for their admission have been discussed with the patient and/or available family. They convey agreement and understanding for the need to be admitted and for their admission diagnosis. PLAN: 
1. Admit to hospitalist  
 
Return to ED if worse Diagnosis Clinical Impression:  
1. Weakness of left lower extremity Attestations: This note is prepared by Gerardo Lorenzo, acting as Scribe for Ankita Morales MD. Ankita Morales MD: The scribe's documentation has been prepared under my direction and personally reviewed by me in its entirety. I confirm that the note above accurately reflects all work, treatment, procedures, and medical decision making performed by me. This note will not be viewable in [address]

## 2018-09-04 NOTE — H&P
Hospitalist Admission Note NAME: Mina Hanks :  1945 MRN:  [id number] Date/Time:  2018 2:42 PM 
 
Patient PCP: Faheem Mojica MD 
______________________________________________________________________ Given the patient's current clinical presentation, I have a high level of concern for decompensation if discharged from the emergency department. Complex decision making was performed, which includes reviewing the patient's available past medical records, laboratory results, and x-ray films. My assessment of this patient's clinical condition and my plan of care is as follows. Assessment / Plan: LLE weakness, r/o stroke Hx of stroke with residual left eye blurry vision CAD 
DMII 
HLD 
CKD III at baseline 
-Admit to Obs 
-Neuro order set used 
-Doppler, Echo, MRI 
-PT/OT 
-Neurology Consult 
-A1c, Lipid panel 
-Continue Aggrenox - of note pt was previously on Eliquis but this was discontinued last year - she was placed on Eliquis for DVT  
-Hold Actos 
-FS monitoring, sliding scale Code Status: Full Surrogate Decision Maker:  DVT Prophylaxis: SCD 
GI Prophylaxis: not indicated Baseline: Independent Subjective: CHIEF COMPLAINT: left leg weakness leading to fall HISTORY OF PRESENT ILLNESS:    
Mina Hanks is a 72 y.o.  female who presents with CC listed above. Pt lives at home with her  and is independent. She was using the bathroom this morning when she noticed that her left leg suddenly felt weak. She then felt as if she could not bear any weight on it and fell \"slowly\" to the ground. Pt denies any LOC or striking her head. She denies any muscular pain. Since initially feeling as if her leg was weak, pt also experienced some \"trembling. \" She denies any numbness or tingling. She does have a history of stroke. She denies any CP, nausea, vomiting, SOB, dizziness, fever or chills. We were asked to admit for work up and evaluation of the above problems. Past Medical History:  
Diagnosis Date  Arthritis  CAD (coronary artery disease)  Diabetes ([facility]  Hypertension  Stroke ([facility] Past Surgical History:  
Procedure Laterality Date  COLONOSCOPY N/A 2/16/2018 COLONOSCOPY performed by Angie De La Rosa MD at [facility] ENDOSCOPY  
 HX GYN    
 HX ORTHOPAEDIC    
 right knee replacement Social History Substance Use Topics  Smoking status: Never Smoker  Smokeless tobacco: Never Used  Alcohol use Yes Comment: rarely Paternal history of HTN. No Known Allergies Prior to Admission medications Medication Sig Start Date End Date Taking? Authorizing Provider  
ferrous sulfate 325 mg (65 mg iron) tablet Take 325 mg by mouth Daily (before breakfast). Yes Sai Hayes MD  
ezetimibe (ZETIA) 10 mg tablet Take 10 mg by mouth. Yes Sai Hayes MD  
torsemide (DEMADEX) 100 mg tablet Take 100 mg by mouth daily. Yes Sai Hayes MD  
amLODIPine (NORVASC) 5 mg tablet Take 5 mg by mouth daily. Yes Historical Provider  
cholecalciferol (VITAMIN D3) 1,000 unit cap Take 1,000 Units by mouth daily. Yes Historical Provider  
insulin lispro protamine/insulin lispro (HUMALOG MIX 75-25) 100 unit/mL (75-25) injection 22 Units by SubCUTAneous route two (2) times a day. Yes Sai Hayes MD  
DULoxetine (CYMBALTA) 60 mg capsule Take 60 mg by mouth daily. Yes Sai Hayes MD  
pioglitazone (ACTOS) 30 mg tablet Take 15 mg by mouth daily. Yes Sai Hayes MD  
FOLIC ACID/MV,FE,OTHER MIN (CENTRUM COMPLETE PO) Take  by mouth. Yes Sai Hayes MD  
aspirin delayed-release 81 mg tablet Take 1 Tab by mouth daily. 2/19/17   Tong Nelson MD  
HYDROcodone-acetaminophen (NORCO) 7.5-325 mg per tablet Take 1 Tab by mouth every four (4) hours as needed. Max Daily Amount: 6 Tabs.  2/19/17   Tong Nelson MD  
 apixaban (ELIQUIS) 5 mg tablet Take 1 Tab by mouth every twelve (12) hours. 2/19/17   Anu Galvin MD  
atorvastatin (LIPITOR) 40 mg tablet  10/24/14   Sai Hayes MD  
 
 
REVIEW OF SYSTEMS:    
I am not able to complete the review of systems because: The patient is intubated and sedated The patient has altered mental status due to his acute medical problems The patient has baseline aphasia from prior stroke(s) The patient has baseline dementia and is not reliable historian The patient is in acute medical distress and unable to provide information Total of 12 systems reviewed as follows:   
   POSITIVE= underlined text  Negative = text not underlined General:  fever, chills, sweats, generalized weakness, weight loss/gain,  
   loss of appetite Eyes:    blurred vision, eye pain, loss of vision, double vision ENT:    rhinorrhea, pharyngitis Respiratory:   cough, sputum production, SOB, KWON, wheezing, pleuritic pain  
Cardiology:   chest pain, palpitations, orthopnea, PND, edema, syncope Gastrointestinal:  abdominal pain , N/V, diarrhea, dysphagia, constipation, bleeding Genitourinary:  frequency, urgency, dysuria, hematuria, incontinence Muskuloskeletal :  arthralgia, myalgia, back pain Hematology:  easy bruising, nose or gum bleeding, lymphadenopathy Dermatological: rash, ulceration, pruritis, color change / jaundice Endocrine:   hot flashes or polydipsia Neurological:  headache, dizziness, confusion, focal weakness, paresthesia, Speech difficulties, memory loss, gait difficulty Psychological: Feelings of anxiety, depression, agitation Objective: VITALS:   
Visit Vitals  /72 (BP 1 Location: Right arm, BP Patient Position: Sitting)  Pulse 88  Temp 98.6 °F (37 °C)  Resp 16  SpO2 100% PHYSICAL EXAM: 
 
General:    Alert, cooperative, no distress, appears stated age. HEENT: Atraumatic, anicteric sclerae, pink conjunctivae No oral ulcers, mucosa moist, throat clear, dentition fair Neck:  Supple, symmetrical,  thyroid: non tender Lungs:   Clear to auscultation bilaterally. No Wheezing or Rhonchi. No rales. Chest wall:  No tenderness  No Accessory muscle use. Heart:   Regular  rhythm,  No  murmur   No edema Abdomen:   Soft, non-tender. Not distended. Bowel sounds normal 
Extremities: No cyanosis. No clubbing,   
  Skin turgor normal, Capillary refill normal, Radial dial pulse 2+ Skin:     Not pale. Not Jaundiced  No rashes Psych:  Good insight. Not depressed. Not anxious or agitated. Neurologic: EOMs intact. No facial asymmetry. No aphasia or slurred speech. RLE motor str 5/5. RUE motor str 4/5. Sensation grossly intact. Alert and oriented X 4.  
 
_______________________________________________________________________ Care Plan discussed with: 
  Comments Patient Family RN Care Manager Consultant:     
_______________________________________________________________________ Expected  Disposition:  
Home with Family HH/PT/OT/RN   
SNF/LTC   
SHELBIE   
________________________________________________________________________ TOTAL TIME:  55 Minutes Critical Care Provided     Minutes non procedure based Comments  
 x Reviewed previous records  
>50% of visit spent in counseling and coordination of care x Discussion with patient and/or family and questions answered 
  
 
________________________________________________________________________ Signed: Kindra Velasquez MD 
 
Procedures: see electronic medical records for all procedures/Xrays and details which were not copied into this note but were reviewed prior to creation of Plan. LAB DATA REVIEWED:   
Recent Results (from the past 24 hour(s)) GLUCOSE, POC Collection Time: 09/04/18 12:44 PM  
Result Value Ref Range Glucose (POC) 95 65 - 100 mg/dL  Performed by Denise EUGENE WITH AUTOMATED DIFF  
 Collection Time: 09/04/18 12:47 PM  
Result Value Ref Range WBC 4.9 3.6 - 11.0 K/uL  
 RBC 4.30 3.80 - 5.20 M/uL  
 HGB 11.9 11.5 - 16.0 g/dL HCT 36.8 35.0 - 47.0 % MCV 85.6 80.0 - 99.0 FL  
 MCH 27.7 26.0 - 34.0 PG  
 MCHC 32.3 30.0 - 36.5 g/dL  
 RDW 18.1 (H) 11.5 - 14.5 % PLATELET 581 208 - 873 K/uL MPV 12.6 8.9 - 12.9 FL  
 NRBC 0.0 0  WBC ABSOLUTE NRBC 0.00 0.00 - 0.01 K/uL NEUTROPHILS 76 (H) 32 - 75 % LYMPHOCYTES 13 12 - 49 % MONOCYTES 10 5 - 13 % EOSINOPHILS 1 0 - 7 % BASOPHILS 0 0 - 1 % IMMATURE GRANULOCYTES 0 0.0 - 0.5 % ABS. NEUTROPHILS 3.7 1.8 - 8.0 K/UL  
 ABS. LYMPHOCYTES 0.6 (L) 0.8 - 3.5 K/UL  
 ABS. MONOCYTES 0.5 0.0 - 1.0 K/UL  
 ABS. EOSINOPHILS 0.1 0.0 - 0.4 K/UL  
 ABS. BASOPHILS 0.0 0.0 - 0.1 K/UL  
 ABS. IMM. GRANS. 0.0 0.00 - 0.04 K/UL  
 DF AUTOMATED    
 RBC COMMENTS ANISOCYTOSIS 
1+ METABOLIC PANEL, COMPREHENSIVE Collection Time: 09/04/18 12:47 PM  
Result Value Ref Range Sodium 144 136 - 145 mmol/L Potassium 3.9 3.5 - 5.1 mmol/L Chloride 106 97 - 108 mmol/L  
 CO2 31 21 - 32 mmol/L Anion gap 7 5 - 15 mmol/L Glucose 116 (H) 65 - 100 mg/dL BUN 58 (H) 6 - 20 MG/DL Creatinine 3.50 (H) 0.55 - 1.02 MG/DL  
 BUN/Creatinine ratio 17 12 - 20 GFR est AA 16 (L) >60 ml/min/1.73m2 GFR est non-AA 13 (L) >60 ml/min/1.73m2 Calcium 8.9 8.5 - 10.1 MG/DL Bilirubin, total 0.5 0.2 - 1.0 MG/DL  
 ALT (SGPT) 39 12 - 78 U/L  
 AST (SGOT) 29 15 - 37 U/L Alk. phosphatase 118 (H) 45 - 117 U/L Protein, total 7.3 6.4 - 8.2 g/dL Albumin 3.6 3.5 - 5.0 g/dL Globulin 3.7 2.0 - 4.0 g/dL A-G Ratio 1.0 (L) 1.1 - 2.2 PROTHROMBIN TIME + INR Collection Time: 09/04/18 12:47 PM  
Result Value Ref Range INR 1.0 0.9 - 1.1 Prothrombin time 10.3 9.0 - 11.1 sec PTT Collection Time: 09/04/18 12:47 PM  
Result Value Ref Range  aPTT 21.0 (L) 22.1 - 32.0 sec  
 aPTT, therapeutic range     58.0 - 77.0 SECS  
 SAMPLES BEING HELD Collection Time: 09/04/18 12:47 PM  
Result Value Ref Range SAMPLES BEING HELD 1 RED 1 DRK GRN    
 COMMENT Add-on orders for these samples will be processed based on acceptable specimen integrity and analyte stability, which may vary by analyte. URINALYSIS W/ RFLX MICROSCOPIC Collection Time: 09/04/18  1:38 PM  
Result Value Ref Range Color YELLOW/STRAW Appearance CLEAR CLEAR Specific gravity 1.007 1.003 - 1.030    
 pH (UA) 7.0 5.0 - 8.0 Protein 30 (A) NEG mg/dL Glucose NEGATIVE  NEG mg/dL Ketone NEGATIVE  NEG mg/dL Bilirubin NEGATIVE  NEG Blood NEGATIVE  NEG Urobilinogen 0.2 0.2 - 1.0 EU/dL Nitrites NEGATIVE  NEG Leukocyte Esterase NEGATIVE  NEG    
 WBC 0-4 0 - 4 /hpf  
 RBC 0-5 0 - 5 /hpf Epithelial cells FEW FEW /lpf Bacteria NEGATIVE  NEG /hpf Hyaline cast 0-2 0 - 5 /lpf

## 2018-09-04 NOTE — ED NOTES
TRANSFER - OUT REPORT: 
 
Verbal report given to BALDO Nguyen(name) on Andreina Olvera  being transferred to neuro room 3115(unit) for routine progression of care Report consisted of patients Situation, Background, Assessment and  
Recommendations(SBAR). Information from the following report(s) SBAR, Kardex, ED Summary, Intake/Output, MAR and Recent Results was reviewed with the receiving nurse. Lines:  
Peripheral IV 09/04/18 Left;Upper Arm (Active) Site Assessment Clean, dry, & intact 9/4/2018  2:49 PM  
Phlebitis Assessment 0 9/4/2018  2:49 PM  
Infiltration Assessment 0 9/4/2018  2:49 PM  
Dressing Status Clean, dry, & intact 9/4/2018  2:49 PM  
Dressing Type Transparent 9/4/2018  2:49 PM  
Hub Color/Line Status Pink 9/4/2018  2:49 PM  
  
 
Opportunity for questions and clarification was provided. Patient transported with: 
 [facility]

## 2018-09-04 NOTE — PROGRESS NOTES
-Please complete MRI History and Safety Screening Form for this patient using KARDEX only under Orders Requiring a Screening Form: 
 

## 2018-09-04 NOTE — ED NOTES
Pt ambulatory  to ED. Pt c/o of left leg weakness that began this morning. Pt reports she was standing in the bathroom but her leg started trembling and as she began to walk her leg weakened below her. Pt has hx of stroke. Pt denies hitting her head, headache, weakness and numbness in her arms.

## 2018-09-04 NOTE — PROGRESS NOTES
Pharmacy Clarification of the Prior to Admission Medication Regimen Retrospective to the Admission Medication Reconciliation The patient was interviewed regarding clarification of the prior to admission medication regimen.  was present in room and obtained permission from patient to discuss drug regimen with visitor(s) present. Patient was questioned regarding use of any other inhalers, topical products, over the counter medications, herbal medications, vitamin products or ophthalmic/nasal/otic medication use. Information Obtained From: Patient, prescription bottles, RX Query Recommendations/Findings: The following amendments were made to the patient's active medication list on file at [facility]:  
 
1) Additions:  
? cloNIDine HCl (CATAPRES) 0.1 mg tablet 
? cyanocobalamin 1,000 mcg tablet 
? aspirin-dipyridamole (AGGRENOX)  mg per SR capsule 2) Removals:  
? apixaban 
? asa 
? cholecalciferol 
? norco 
 
3) Changes: 
? amLODIPine (NORVASC) (Old regimen: (strength 5 mg) 5 mg daily /New regimen: (strength 10 mg) 10 mg daily) ? atorvastatin (LIPITOR) 40 mg tablet (Old regimen: no sig /New regimen: 40 mg daily) ? insulin lispro protamine/insulin lispro (HUMALOG MIX 75-25) 100 unit/mL (75-25) injection (Old regimen: 22 units SC BID /New regimen: 24 units SC BID) ? ferrous sulfate 325 mg (65 mg iron) tablet (Old regimen: 325 mg daily /New regimen: 325 mg BID) 4) Pertinent Pharmacy Findings: 
? Updated patients preferred outpatient [facility] to: [facility] 
 
 PTA medication list was corrected to the following:  
 
Prior to Admission Medications Prescriptions Last Dose Informant Patient Reported? Taking? DULoxetine (CYMBALTA) 60 mg capsule 9/3/2018 at Unknown time Other Yes Yes Sig: Take 60 mg by mouth daily.   
FOLIC ACID/MV,FE,OTHER MIN (CENTRUM COMPLETE PO) 9/3/2018 at Unknown time Other Yes Yes Sig: Take 1 Tab by mouth daily. amLODIPine (NORVASC) 10 mg tablet 9/3/2018 at Unknown time Other Yes Yes Sig: Take 10 mg by mouth daily. aspirin-dipyridamole (AGGRENOX)  mg per SR capsule 9/3/2018 at Unknown time Other Yes Yes Sig: Take 1 Cap by mouth two (2) times a day. atorvastatin (LIPITOR) 40 mg tablet 9/3/2018 at Unknown time Other Yes Yes Sig: Take 40 mg by mouth daily. cloNIDine HCl (CATAPRES) 0.1 mg tablet 9/3/2018 at Unknown time Other Yes Yes Sig: Take 0.1 mg by mouth two (2) times a day. cyanocobalamin 1,000 mcg tablet 9/3/2018 at Unknown time Other Yes Yes Sig: Take 1,000 mcg by mouth daily. ezetimibe (ZETIA) 10 mg tablet 9/3/2018 at Unknown time Other Yes Yes Sig: Take 10 mg by mouth daily. ferrous sulfate 325 mg (65 mg iron) tablet 9/3/2018 at Unknown time Other Yes Yes Sig: Take 325 mg by mouth two (2) times a day. insulin lispro protamine/insulin lispro (HUMALOG MIX 75-25) 100 unit/mL (75-25) injection 9/3/2018 at Unknown time Other Yes Yes Si Units by SubCUTAneous route two (2) times a day. pioglitazone (ACTOS) 15 mg tablet 9/3/2018 at Unknown time Other Yes Yes Sig: Take 15 mg by mouth daily. torsemide (DEMADEX) 100 mg tablet 9/3/2018 at Unknown time Other Yes Yes Sig: Take 100 mg by mouth daily. Facility-Administered Medications: None Thank you, 
Ellie Lemus [facility] Medication History Pharmacy Technician

## 2018-09-05 VITALS
SYSTOLIC BLOOD PRESSURE: 167 MMHG | HEIGHT: 61 IN | DIASTOLIC BLOOD PRESSURE: 60 MMHG | TEMPERATURE: 97.3 F | BODY MASS INDEX: 32.47 KG/M2 | WEIGHT: 172 LBS | RESPIRATION RATE: 20 BRPM | HEART RATE: 90 BPM | OXYGEN SATURATION: 99 %

## 2018-09-05 PROBLEM — I65.23 BILATERAL CAROTID ARTERY STENOSIS: Status: ACTIVE | Noted: 2018-09-05

## 2018-09-05 LAB
CHOLEST SERPL-MCNC: 131 MG/DL
EST. AVERAGE GLUCOSE BLD GHB EST-MCNC: 154 MG/DL
GLUCOSE BLD STRIP.AUTO-MCNC: 213 MG/DL (ref 65–100)
GLUCOSE BLD STRIP.AUTO-MCNC: 265 MG/DL (ref 65–100)
HBA1C MFR BLD: 7 % (ref 4.2–6.3)
HDLC SERPL-MCNC: 61 MG/DL
HDLC SERPL: 2.1 {RATIO} (ref 0–5)
LDLC SERPL CALC-MCNC: 52.4 MG/DL (ref 0–100)
LIPID PROFILE,FLP: NORMAL
SERVICE CMNT-IMP: ABNORMAL
SERVICE CMNT-IMP: ABNORMAL
TRIGL SERPL-MCNC: 88 MG/DL (ref ?–150)
VLDLC SERPL CALC-MCNC: 17.6 MG/DL

## 2018-09-05 PROCEDURE — 74011636637 HC RX REV CODE- 636/637: Performed by: GENERAL ACUTE CARE HOSPITAL

## 2018-09-05 PROCEDURE — 74011250637 HC RX REV CODE- 250/637: Performed by: GENERAL ACUTE CARE HOSPITAL

## 2018-09-05 PROCEDURE — 97530 THERAPEUTIC ACTIVITIES: CPT

## 2018-09-05 PROCEDURE — G8987 SELF CARE CURRENT STATUS: HCPCS

## 2018-09-05 PROCEDURE — G8979 MOBILITY GOAL STATUS: HCPCS

## 2018-09-05 PROCEDURE — 97166 OT EVAL MOD COMPLEX 45 MIN: CPT

## 2018-09-05 PROCEDURE — 93880 EXTRACRANIAL BILAT STUDY: CPT

## 2018-09-05 PROCEDURE — G8988 SELF CARE GOAL STATUS: HCPCS

## 2018-09-05 PROCEDURE — G8989 SELF CARE D/C STATUS: HCPCS

## 2018-09-05 PROCEDURE — 80061 LIPID PANEL: CPT | Performed by: GENERAL ACUTE CARE HOSPITAL

## 2018-09-05 PROCEDURE — 36415 COLL VENOUS BLD VENIPUNCTURE: CPT | Performed by: GENERAL ACUTE CARE HOSPITAL

## 2018-09-05 PROCEDURE — 99218 HC RM OBSERVATION: CPT

## 2018-09-05 PROCEDURE — 97161 PT EVAL LOW COMPLEX 20 MIN: CPT

## 2018-09-05 PROCEDURE — G8978 MOBILITY CURRENT STATUS: HCPCS

## 2018-09-05 PROCEDURE — 82962 GLUCOSE BLOOD TEST: CPT

## 2018-09-05 PROCEDURE — 93306 TTE W/DOPPLER COMPLETE: CPT

## 2018-09-05 PROCEDURE — 83036 HEMOGLOBIN GLYCOSYLATED A1C: CPT | Performed by: GENERAL ACUTE CARE HOSPITAL

## 2018-09-05 RX ADMIN — Medication 10 ML: at 03:38

## 2018-09-05 RX ADMIN — INSULIN LISPRO 3 UNITS: 100 INJECTION, SOLUTION INTRAVENOUS; SUBCUTANEOUS at 08:21

## 2018-09-05 RX ADMIN — THERA TABS 1 TABLET: TAB at 10:07

## 2018-09-05 RX ADMIN — AMLODIPINE BESYLATE 5 MG: 5 TABLET ORAL at 10:07

## 2018-09-05 RX ADMIN — INSULIN LISPRO 5 UNITS: 100 INJECTION, SOLUTION INTRAVENOUS; SUBCUTANEOUS at 14:53

## 2018-09-05 RX ADMIN — Medication 10 ML: at 14:53

## 2018-09-05 RX ADMIN — ASPIRIN AND EXTENDED-RELEASE DIPYRIDAMOLE 1 CAPSULE: 25; 200 CAPSULE ORAL at 10:07

## 2018-09-05 RX ADMIN — TORSEMIDE 100 MG: 20 TABLET ORAL at 10:48

## 2018-09-05 RX ADMIN — EZETIMIBE 10 MG: 10 TABLET ORAL at 10:07

## 2018-09-05 RX ADMIN — FERROUS SULFATE TAB 325 MG (65 MG ELEMENTAL FE) 325 MG: 325 (65 FE) TAB at 08:21

## 2018-09-05 NOTE — PROGRESS NOTES
Speech pathology note Reviewed chart and note MRI showed, \"There is no evidence of acute infarction. No intracranial mass, hemorrhage, midline shift or mass effect. Severe chronic microvascular ischemic change with areas of encephalomalacia in the right frontal lobe, right occipital lobe and right basal ganglia consistent with remote infarction. \" Note patient passed the STAND and a regular diet was ordered. Discussed case with RN who reported no SLP related concerns. Introduced self and role of SLP to patient. Patient denied dysphagia, including no coughing, choking, or globus sensation. Patient denied new decline in motor speech, language, or cognitive function. Patient reported memory deficits from a past CVA, and she has received SLP treatment for these deficits. Formal SLP evaluation not clinically indicated at this time. Will sign off. Please re-consult if further needs arise. Thank you. Desmond Magnuson, Verner Asp., CCC-SLP

## 2018-09-05 NOTE — PROGRESS NOTES
Occupational Therapy neurological EVALUATION with discharge Patient: Josue Campoverde (77 y.o. female) Date: 9/5/2018 Primary Diagnosis: Weakness of left leg Precautions: fall ASSESSMENT: 
Based on the objective data described below, the patient presents with residual L fine motor coordination, ROM and cognitive deficits from past CVA ~10 years ago. Pt admitted for new onset L LE weakness with c/o increased tingling compared to her baseline B foot diabetic neuropathy. Pt received in chair, equal B UE strength 4/5. Noted decreased finger to nose speed on L as well as decreased L finger opposition. Pt denies any new focal neuro deficits to UEs and coordination since this admission. Pt required SBA for sit <> stand, slowed mobility without use of AD for standing ADLs and simulated IADLs. Pt able to retrieve item from high shelf, place in low drawer and retrieved sneakers off the floor without LOB. Pt has attended IP, OP and HH therapies in the past. May benefit from OP neuro PT for higher level balance but will defer discharge recommendations to PT. Pt appears to be at her ADL baseline. Further skilled acute occupational therapy is not indicated at this time. Discharge Recommendations: defer to PT Further Equipment Recommendations for Discharge: none SUBJECTIVE:  
Patient stated my left leg still feels weak.  OBJECTIVE DATA SUMMARY:  
HISTORY:  
Past Medical History:  
Diagnosis Date  Arthritis  CAD (coronary artery disease)  Diabetes ([address]  Hypertension  Stroke ([address] Past Surgical History:  
Procedure Laterality Date  COLONOSCOPY N/A 2/16/2018 COLONOSCOPY performed by Alexandrea Escobedo MD at [facility] ENDOSCOPY  
 HX GYN    
 HX ORTHOPAEDIC    
 right knee replacement Prior Level of Function/Environment/Context: lives with , no falls in past year other than prior to admission.  Hx of CVA with L UE coordination deficits and visual deficits which are baseline. Uses quad cane in community and PRN in the home. Occupations in which the patient is/was successful, what are the barriers preventing that success:  
Performance Patterns (routines, roles, habits, and rituals):  
Personal Interests and/or values:  
Expanded or extensive additional review of patient history: CVA Home Situation Home Environment: Private residence # Steps to Enter: 4 Rails to Enter: Yes Hand Rails : Bilateral 
Wheelchair Ramp: No 
One/Two Story Residence: One story Living Alone: No 
Support Systems: Spouse/Significant Other/Partner Patient Expects to be Discharged to[de-identified] Private residence Current DME Used/Available at Home: Cane, quad, Tub transfer bench Tub or Shower Type: Tub/Shower combination Hand dominance: Right EXAMINATION OF PERFORMANCE DEFICITS: 
Cognitive/Behavioral Status: 
  
  
  
Perception: Appears intact Perseveration: No perseveration noted Safety/Judgement: Awareness of environment; Fall prevention Skin: intact Edema: none noted Hearing: Auditory Auditory Impairment: None Vision/Perceptual:   
Tracking: Able to track stimulus in all quadrants w/o difficulty (exception impaired L eye, baseline) Range of Motion: 
 
AROM: Within functional limits (B UEs) Strength: 
 
Strength: Within functional limits (equal B UEs) Coordination: 
Coordination: Generally decreased, functional (L opposition, slowed finger to nose, baseline) Fine Motor Skills-Upper: Left Impaired;Right Intact Gross Motor Skills-Upper: Left Impaired;Right Intact Tone & Sensation: 
 
Tone: Normal 
Sensation: Impaired (diabetic neuropathy in B feet) Balance: 
Sitting: Intact Standing: Impaired; Without support Standing - Static: Good Standing - Dynamic : Fair Functional Mobility and Transfers for ADLs: 
Bed Mobility: Transfers: 
Sit to Stand: Stand-by assistance Functional Transfers Toilet Transfer : Stand-by assistance Bed to Chair: Stand-by assistance ADL Assessment: 
Feeding: Independent Oral Facial Hygiene/Grooming: Stand-by assistance Bathing: Supervision ( supervises at home for safety) Upper Body Dressing: Supervision Lower Body Dressing: Supervision (to don velcro shoes seated) Toileting: Stand by assistance ADL Intervention and task modifications: 
  
 
  
 
  
 
  
 
  
 
  
 
  
 
Cognitive Retraining Safety/Judgement: Awareness of environment; Fall prevention Functional Measure:  
Barthel Index: 
 
Bathin Bladder: 10 Bowels: 10 
Groomin Dressin Feeding: 10 Mobility: 10 Stairs: 5 Toilet Use: 10 Transfer (Bed to Chair and Back): 10 Total: 80 Barthel and G-code impairment scale: 
Percentage of impairment CH 
0% CI 
1-19% CJ 
20-39% CK 
40-59% CL 
60-79% CM 
80-99% CN 
100% Barthel Score 0-100 100 99-80 79-60 59-40 20-39 1-19 
 0 Barthel Score 0-20 20 17-19 13-16 9-12 5-8 1-4 0 The Barthel ADL Index: Guidelines 1. The index should be used as a record of what a patient does, not as a record of what a patient could do. 2. The main aim is to establish degree of independence from any help, physical or verbal, however minor and for whatever reason. 3. The need for supervision renders the patient not independent. 4. A patient's performance should be established using the best available evidence. Asking the patient, friends/relatives and nurses are the usual sources, but direct observation and common sense are also important. However direct testing is not needed. 5. Usually the patient's performance over the preceding 24-48 hours is important, but occasionally longer periods will be relevant. 6. Middle categories imply that the patient supplies over 50 per cent of the effort. 7. Use of aids to be independent is allowed. Lotus Pitt., Barthel, D.W. (0587). Functional evaluation: the Barthel Index. [address] [facility] (142. FRANKLIN Marcos, Acacia Christianson., William Mays., Michelle, [address] (1999). Measuring the change indisability after inpatient rehabilitation; comparison of the responsiveness of the Barthel Index and Functional [place] Measure. Journal of Neurology, Neurosurgery, and Psychiatry, 66(4), 310-964. MILDRED Padilla, SUDHIR Sellers, & Santi Morejon M.A. (2004.) Assessment of post-stroke quality of life in cost-effectiveness studies: The usefulness of the Barthel Index and the EuroQoL-5D. [facility], 52, 822-80 G codes: In compliance with CMSs Claims Based Outcome Reporting, the following G-code set was chosen for this patient based on their primary functional limitation being treated: The outcome measure chosen to determine the severity of the functional limitation was the barthel index with a score of 80/100 which was correlated with the impairment scale. ? Self Care:  
  - CURRENT STATUS: CI - 1%-19% impaired, limited or restricted  - GOAL STATUS: CI - 1%-19% impaired, limited or restricted  - D/C STATUS:  CI - 1%-19% impaired, limited or restricted Occupational Therapy Evaluation Charge Determination History Examination Decision-Making MEDIUM Complexity : Expanded review of history including physical, cognitive and psychosocial  history  MEDIUM Complexity : 3-5 performance deficits relating to physical, cognitive , or psychosocial skils that result in activity limitations and / or participation restrictions MEDIUM Complexity : Patient may present with comorbidities that affect occupational performnce. Miniml to moderate modification of tasks or assistance (eg, physical or verbal ) with assesment(s) is necessary to enable patient to complete evaluation Based on the above components, the patient evaluation is determined to be of the following complexity level: MEDIUM Pain: 
Pain Scale 1: Numeric (0 - 10) Pain Intensity 1: 0 Activity Tolerance: VSS Please refer to the flowsheet for vital signs taken during this treatment. After treatment:  
[x]  Patient left in no apparent distress sitting up in chair 
[]  Patient left in no apparent distress in bed 
[x]  Call bell left within reach [x]  Nursing notified 
[x]  Caregiver present 
[]  Bed alarm activated COMMUNICATION/EDUCATION:  
Findings and recommendations were discussed with: Physical Therapist and Registered Nurse MD present in room to inform pt and  MRI was negative for acute CVA. Pt with hx of CVA 2007/2008 right frontal lobe, right occipital lobe and right basal ganglia Patient and/or family was verbally educated on the BE FAST acronym for signs/symptoms of CVA and TIA. BE FAST was written on patient's communication board  for visual education and reinforcement. All questions answered with patient indicating good understanding. []      Home safety education was provided and the patient/caregiver indicated understanding. [x]      Patient/family have participated as able and agree with findings and recommendations. []      Patient is unable to participate in plan of care at this time. Thank you for this referral. 
Margot Branch, OT Time Calculation: 31 mins

## 2018-09-05 NOTE — DISCHARGE INSTRUCTIONS
Transient Ischemic Attack: Care Instructions  Your Care Instructions    A transient ischemic attack (TIA) is when blood flow to a part of your brain is blocked for a short time. A TIA is like a stroke but usually lasts only a few minutes. A TIA does not cause lasting brain damage. Any vision problems, slurred speech, or other symptoms usually go away in 10 to 20 minutes. But they may last for up to 24 hours. TIAs are often warning signs of a stroke. Some people who have a TIA may have a stroke in the future. A stroke can cause symptoms like those of a TIA. But a stroke causes lasting damage to your brain. You can take steps to help prevent a stroke. One thing you can do is get early treatment. If you have other new symptoms, or if your symptoms do not get better, go back to the emergency room or call your doctor right away. Getting treatment right away may prevent long-term brain damage caused by a stroke. The doctor has checked you carefully, but problems can develop later. If you notice any problems or new symptoms, get medical treatment right away. Follow-up care is a key part of your treatment and safety. Be sure to make and go to all appointments, and call your doctor if you are having problems. It's also a good idea to know your test results and keep a list of the medicines you take. How can you care for yourself at home? Medicines    · Be safe with medicines. Take your medicines exactly as prescribed. Call your doctor if you think you are having a problem with your medicine.     · If you take a blood thinner, such as aspirin, be sure you get instructions about how to take your medicine safely.  Blood thinners can cause serious bleeding problems.     · Call your doctor if you are not able to take your medicines for any reason.     · Do not take any over-the-counter medicines or herbal products without talking to your doctor first.     · If you take birth control pills or hormone therapy, talk to your doctor. Ask if these treatments are right for you.    Lifestyle changes    · Do not smoke. If you need help quitting, talk to your doctor about stop-smoking programs and medicines.     · Be active. If your doctor recommends it, get more exercise. Walking is a good choice. Bit by bit, increase the amount you walk every day. Try for at least 30 minutes on most days of the week. You also may want to swim, bike, or do other activities.     · Eat heart-healthy foods. These include fruits, vegetables, high-fiber foods, fish, and foods that are low in sodium, saturated fat, and trans fat.     · Stay at a healthy weight. Lose weight if you need to.     · Limit alcohol to 2 drinks a day for men and 1 drink a day for women.    Staying healthy    · Manage other health problems such as diabetes, high blood pressure, and high cholesterol.     · Get the flu vaccine every year. When should you call for help? Call 911 anytime you think you may need emergency care. For example, call if:    · You have new or worse symptoms of a stroke. These may include:  ¨ Sudden numbness, tingling, weakness, or loss of movement in your face, arm, or leg, especially on only one side of your body. ¨ Sudden vision changes. ¨ Sudden trouble speaking. ¨ Sudden confusion or trouble understanding simple statements. ¨ Sudden problems with walking or balance. ¨ A sudden, severe headache that is different from past headaches. Call 911 even if these symptoms go away in a few minutes.     · You feel like you are having another TIA.    Watch closely for changes in your health, and be sure to contact your doctor if you have any problems. Where can you learn more? Go to http://yue-fitz.info/. Enter ([phone] in the search box to learn more about \"Transient Ischemic Attack: Care Instructions. \"  Current as of: November 21, 2017  Content Version: 11.7  © 8520-9347 [employer].  Care instructions adapted under license by [employer] (which disclaims liability or warranty for this information). If you have questions about a medical condition or this instruction, always ask your healthcare professional. [address] any warranty or liability for your use of this information.

## 2018-09-05 NOTE — PROGRESS NOTES
Physical Therapy Goals Initiated 9/5/2018 1. Patient will move from supine to sit and sit to supine , scoot up and down and roll side to side in bed with independence within 7 day(s). 2.  Patient will transfer from bed to chair and chair to bed with independence using the least restrictive device within 7 day(s). 3.  Patient will perform sit to stand with independence within 7 day(s). 4.  Patient will ambulate with modified independence for 656 feet with the least restrictive device within 7 day(s). 5.  Patient will ascend/descend 4 stairs with bilateral handrail(s) with independence within 7 day(s). physical Therapy EVALUATION Patient: Clinton Gillis (77 y.o. female) Date: 9/5/2018 Primary Diagnosis: Weakness of left leg Precautions:   Fall ASSESSMENT : 
Based on the objective data described below, the patient presents with impaired balance, decreased gait speed, and altered gait patterns 2/2 prior CVA. Pt presented sitting in chair, pleasant and agreeable to therapy. Her supportive  was present in the room during the session. Pt history and prior level of function was recounted. Pt able to perform sit<>stand transfer with supervision. Pt then amb for total of 350ft with CGA, demonstrating wide BRISA, audible R foot slap, decreased step length, and decreased gait speed (0.39 m/s) all of which pt notes is at her baseline and is typical for her post-CVA from 10 yrs ago. Upon returning to room, pt was administered Mixon balance test with pt scoring 37/56, placing her at a moderate risk for falls. This score demonstrates her current balance impairments, which are most severe in narrowed BRISA positions and during SLS activities. 5x sit to stand was also administered, with a time of 42 seconds which is well above age-matched normative values. Pt left sitting in chair with  present and nursing notified.   
 
Patient will benefit from skilled intervention to address the above impairments. At discharge it would be beneficial to attend outpatient PT to address balance impairments gait abnormalities. Patients rehabilitation potential is considered to be Excellent Factors which may influence rehabilitation potential include:  
[x]         None noted 
[]         Mental ability/status []         Medical condition 
[]         Home/family situation and support systems 
[]         Safety awareness 
[]         Pain tolerance/management 
[]         Other: PLAN : 
Recommendations and Planned Interventions: 
[x]           Bed Mobility Training             [x]    Neuromuscular Re-Education 
[x]           Transfer Training                   []    Orthotic/Prosthetic Training 
[x]           Gait Training                         []    Modalities [x]           Therapeutic Exercises           []    Edema Management/Control 
[x]           Therapeutic Activities            [x]    Patient and Family Training/Education 
[]           Other (comment): Frequency/Duration: Patient will be followed by physical therapy  3 times a week to address goals. Discharge Recommendations: Outpatient Further Equipment Recommendations for Discharge: None, already own Quad cane and Rollator SUBJECTIVE:  
Patient stated I can wait to eat until we're done.  OBJECTIVE DATA SUMMARY:  
HISTORY:   
Past Medical History:  
Diagnosis Date  Arthritis  CAD (coronary artery disease)  Diabetes ([facility]  Hypertension  Stroke ([facility] Past Surgical History:  
Procedure Laterality Date  COLONOSCOPY N/A 2/16/2018 COLONOSCOPY performed by Alexsandra Faye [facility] at [facility] ENDOSCOPY  
 HX GYN    
 HX ORTHOPAEDIC    
 right knee replacement Prior Level of Function/Home Situation: Pt lives at home with her , both are retired. PTA pt has a history of CVA affecting her L side with LE weakness, blurry vision in L eye, and decreased activity tolerance. Patient furniture walks within the home, and uses a quad cane when in the community. Pt also owns a rollator, and occasionally uses it when amb in community- especially when anticipating having to walk long distances as she uses the seat for rest breaks Pt is able to manage her medications and insulin injections independently. Pt and  have conflicting history of falls in past year, excluding fall PTA:  denies but pt notes one fall out of bed. Pt also notes that she is limited in the distances she is able to walk in the community due to poor endurance; such as being able to walk into doctor's office from parking lot or into store but then requiring motorized cart. Pt denies any formal exercise, but does note that she was recently given a prescription for PT by her PCP to address her balance impairments (which she has not yet started). Personal factors and/or comorbidities impacting plan of care: CVA, HLD, HTN, LE DVT, Eye surgery (3 months ago per pt) resulting in partial vision loss in L eye. Home Situation Home Environment: Private residence # Steps to Enter: 4 Rails to Enter: Yes Hand Rails : Bilateral 
Wheelchair Ramp: No 
One/Two Story Residence: One story Living Alone: No 
Support Systems: Spouse/Significant Other/Partner Patient Expects to be Discharged to[de-identified] Private residence Current DME Used/Available at Home: Cane, quad, Tub transfer bench, [address], rollator Tub or Shower Type: Tub/Shower combination EXAMINATION/PRESENTATION/DECISION MAKING:  
Critical Behavior: 
Neurologic State: Alert Orientation Level: Oriented to person, Oriented to place, Oriented to situation, Oriented X4, Oriented to time Cognition: Appropriate decision making, Appropriate for age attention/concentration, Appropriate safety awareness Safety/Judgement: Awareness of environment, Fall prevention Hearing: Auditory Auditory Impairment: None Range Of Motion: 
AROM: Within functional limits (B LE) Strength:   
Strength: Within functional limits (B LE) Tone & Sensation:  
Tone: Normal 
  
  
  
  
Sensation: Impaired (diabetic neuropathy in B feet) Coordination: 
Coordination: Generally decreased, functional (L opposition, slowed finger to nose, baseline) Vision:  
Tracking: Able to track stimulus in all quadrants w/o difficulty (exception impaired L eye, baseline) Functional Mobility: 
Bed Mobility: 
  
  
  
  
Transfers: 
Sit to Stand: Stand-by assistance Stand to Sit: Stand-by assistance Bed to Chair: Stand-by assistance Balance:  
Sitting: Intact Standing: Impaired; Without support Standing - Static: Good Standing - Dynamic : Fair Ambulation/Gait Training: 
Distance (ft): 350 Feet (ft) Assistive Device: Gait belt;Cane, straight Ambulation - Level of Assistance: Contact guard assistance Gait Description (WDL): Exceptions to [facility] Gait Abnormalities:  (Increased R foot slap (audible when amb)) Base of Support: Widened Speed/Shanthi: Slow Step Length: Left shortened;Right shortened Gait Speed: 
 
Utilizing distance of 6.7 m for test, as well as feet to meters calculation, patient ambulated at 0.39 m/s with self-selected gait speed. Russell RAMIREZ. \"Comfortable and maximum walking speed of adults aged 20-79 years: reference values and determinants. \" Age and Agin Volume 26(1):15-9. Chrissy Garber. \"Age- and gender-related test performance in community-dwelling elderly people: Six-Minute Walk Test, Mixon Balance Scale, Timed Up & Go Test, and gait speeds. \" Physical Therapy: 2002 Volume 82(2):128-37. Mellissa KERNS, Jules VELOZ, Randy Salvador JD, Rochelle Ailynleftylenny WARREN. \"Assessing stability and change of four performance measures: a longitudinal study evaluating outcome following total hip and knee arthroplasty. \" [facility] Musculoskeletal Disorders: 2005 Volume 6(3). Last Caruso, PhD; Dorys Willis, PhD. [facility] Paper: \"Walking Speed: the Sixth Vital Sign\" Journal of Geriatric Physical Therapy: 2009 - Volume 32 - Issue 2 - p 25 . Katey Espinal DPT; Louise Hurst PhD; Chau Merida PT PHD. Walking Speed: The Functional Vital Sign. Journal of Aging Phys Act 2015 April; 23(2):314-322. Functional Measure: 
Five times sit to stand: 
Unable to perform without use of hands. Time with use of hands: 42 seconds Normative averages: 
Clients younger than 61years old  £  10 seconds = Normal  
Clients older than 61years old £ 14.2 seconds = Normal  
Change of ³ 2.3 seconds shows a significant clinical improvement Russell FORD. Reference values for the five repetition sit to stand test: a descriptive metaanalysis of data from elders. Percept Mot Skills 2006; 103(1):215-222. Mixon Balance Test: 
 
Sitting to Standing: 3 Standing Unsupported: 4 Sitting with Back Unsupported: 4 Standing to Sittin Transfers: 4 Standing Unsupported with Eyes Closed: 3 Standing Unsupported with Feet Together: 2 Reach Forward with Outstretched Arm: 2  Object: 3 Turn to Look Over Shoulders: 4 Turn 360 Degrees: 2 Alternate Foot on Step/Stool: 1 Standing Unsupported One Foot in Front: 0 Stand on One Le Total: 37 
  
 
 
56=Maximum possible score;  
0-20=High fall risk 21-40=Moderate fall risk 41-56=Low fall risk Mixon Balance Test and G-code impairment scale: 
Percentage of Impairment CH 
 
0% 
 CI 
 
1-19% CJ 
 
20-39% CK 
 
40-59% CL 
 
60-79% CM 
 
80-99% CN  
 
100% Deneen Jeffery Score 0-56 56 45-55 34-44 23-33 12-22 1-11 0  
 
 
 
 
 
 
 
 
 
G codes: In compliance with CMSs Claims Based Outcome Reporting, the following G-code set was chosen for this patient based on their primary functional limitation being treated:  
 
The outcome measure chosen to determine the severity of the functional limitation was the nichols balance with a score of 37/56 which was correlated with the impairment scale. ? Mobility - Walking and Moving Around:  
  - CURRENT STATUS: CJ - 20%-39% impaired, limited or restricted  - GOAL STATUS: CI - 1%-19% impaired, limited or restricted  - D/C STATUS:  ---------------To be determined--------------- Activity Tolerance:  
Pt and  note at baseline decreased activity tolerance, but pt able to manage well during session with no complaints of fatigue Please refer to the flowsheet for vital signs taken during this treatment. After treatment:  
[x]         Patient left in no apparent distress sitting up in chair 
[]         Patient left in no apparent distress in bed 
[x]         Call bell left within reach [x]         Nursing notified 
[x]         Caregiver present 
[]         Bed alarm activated COMMUNICATION/EDUCATION:  
The patients plan of care was discussed with: Registered Nurse. [x]         Fall prevention education was provided and the patient/caregiver indicated understanding. [x]         Patient/family have participated as able in goal setting and plan of care. [x]         Patient/family agree to work toward stated goals and plan of care. []         Patient understands intent and goals of therapy, but is neutral about his/her participation. []         Patient is unable to participate in goal setting and plan of care. Thank you for this referral. 
Liz Moncada, SPT Time Calculation: 25 mins Regarding student involvement in patient care: A student participated in this treatment session. Per CMS Medicare statements and APTA guidelines I certify that the following was true: 1. I was present and directly observed the entire session. 2. I made all skilled judgments and clinical decisions regarding care. 3. I am the practitioner responsible for assessment, treatment, and documentation.

## 2018-09-05 NOTE — CONSULTS
IP CONSULT TO NEUROLOGY  Consult performed by: Gris Prater  Consult ordered by: Kamrynsarahy Moody AGE 72 y.o. MRN [id number]  1945     REQUESTING PHYSICIAN: Perla Garcia MD      CHIEF COMPLAINT: Left leg weakness     This is a 72 y.o. female with medical history of stroke with residual left hemiparesis is admitted after suffering acute left lower extremity weakness.  found her on the ground unable to get up. He was in the house prior to her fall but was in the kitchen. She was not confused. She states that she was in the bathroom and felt her left leg suddenly become weak. She had no sensory loss, no other motor deficits, no cranial nerve deficits and no difficulty with speech. ASSESSMENT AND PLAN     1. Weakness of left lower extremity  Transient ischemic attack versus stroke  Stroke risk stratification   Hold antihypertensives  MRI brain    2. History of stroke  Continue Aggrenox    3. Hyperlipidemia  Continue Zetia    4. Diabetes type 2  Continue Actos    5. Hypertension  Hold antihypertensives    Discussed with     ALLERGIES:  Review of patient's allergies indicates no known allergies.      Current Facility-Administered Medications   Medication Dose Route Frequency    [START ON 2018] amLODIPine (NORVASC) tablet 5 mg  5 mg Oral DAILY    [START ON 2018] DULoxetine (CYMBALTA) capsule 60 mg  60 mg Oral DAILY    [START ON 2018] ezetimibe (ZETIA) tablet 10 mg  10 mg Oral DAILY    [START ON 2018] ferrous sulfate tablet 325 mg  325 mg Oral ACB    [START ON 2018] therapeutic multivitamin (THERAGRAN) tablet 1 Tab  1 Tab Oral DAILY    [START ON 2018] torsemide (DEMADEX) tablet 100 mg  100 mg Oral DAILY    aspirin-dipyridamole (AGGRENOX)  mg per capsule 1 Cap  1 Cap Oral BID    insulin lispro (HUMALOG) injection   SubCUTAneous AC&HS    glucose chewable tablet 16 g  4 Tab Oral PRN    dextrose (D50W) injection syrg 12.5-25 g  12.5-25 g IntraVENous PRN    glucagon (GLUCAGEN) injection 1 mg  1 mg IntraMUSCular PRN    sodium chloride (NS) flush 5-10 mL  5-10 mL IntraVENous Q8H    sodium chloride (NS) flush 5-10 mL  5-10 mL IntraVENous PRN    acetaminophen (TYLENOL) tablet 650 mg  650 mg Oral Q4H PRN    Or    acetaminophen (TYLENOL) solution 650 mg  650 mg Per NG tube Q4H PRN    Or    acetaminophen (TYLENOL) suppository 650 mg  650 mg Rectal Q4H PRN       Past Medical History:   Diagnosis Date    Arthritis     CAD (coronary artery disease)     [facility]     Hypertension     Stroke ([facility]        Social History   Substance Use Topics    Smoking status: Never Smoker    Smokeless tobacco: Never Used    Alcohol use Yes      Comment: rarely      Family history  No Dutchess's disease  No spinocerebellar atrophy        Visit Vitals    /55 (BP 1 Location: Left arm, BP Patient Position: At rest)    Pulse 87    Temp 97.4 °F (36.3 °C)    Resp 18    Ht 5' 1\" (1.549 m)    Wt 172 lb (78 kg)    SpO2 98%    BMI 32.5 kg/m2      General: Well developed, well nourished. anxious   Head: Normocephalic, atraumatic, anicteric sclera   Neck Normal ROM, No thyromegally   Lungs:  Clear to auscultation bilaterally, No wheezes or rubs   Cardiac: Regular rate and rhythm with no murmurs. Abd: Bowel sounds were audible. No tenderness on palpation   Ext: No pedal edema   Skin: Supple no rash     NeurologicExam:  Mental Status: Alert and oriented to person place and time   Speech: Fluent no aphasia or dysarthria. Cranial Nerves:  II - XII Intact dulling of the left nasolabial fold   Motor:  Full and symmetric strength of upper and lower proximal and distal muscles. Normal bulk and tone. Reflexes:   +1/4 over the proximal tendons of the upper and lower extremities. +0/4 over distal tendons.     Sensory:   Symmetric distal reduction in sensory perception affecting all modalities   Gait:  deferred Tremor:   No tremor noted. Cerebellar:  Coordination intact. Neurovascular: No carotid bruits. No JVD       REVIEWED IMAGING:    MRI :    Results from Hospital Encounter encounter on 09/04/18   MRI BRAIN WO CONT   Narrative EXAM:  MRI BRAIN WO CONT  Clinical history: Left leg weakness  INDICATION:  Left leg weakness    COMPARISON: CT 9/4/2018. CONTRAST:  None. TECHNIQUE: Sagittal T1, axial FLAIR, T2,T1 and gradient echo images as well as  coronal T2 weighted images and axial diffusion weighted images of the head were  obtained. FINDINGS: Extensive periventricular and scattered foci of increased T2 signal  intensity in the corona radiata and centrum semiovale. Moderate area of  encephalomalacia in the posterior right frontal lobe consistent with remote  infarction. Remote right occipital infarction. Remote right basal ganglia  infarction. Remote right frontal infarction. .   The basilar cisterns are open. No acute infarct is identified. The bone windows demonstrate no abnormalities. The visualized portions of the paranasal sinuses and mastoid air cells are  clear. Impression IMPRESSION:   There is no evidence of acute infarction. No intracranial mass, hemorrhage, midline shift or mass effect. Severe chronic microvascular ischemic change with areas of encephalomalacia in  the right frontal lobe, right occipital lobe and right basal ganglia consistent  with remote infarction. CT:    Results from Hospital Encounter encounter on 09/04/18   CT CODE NEURO HEAD WO CONTRAST   Narrative EXAM:  CT CODE NEURO HEAD WO CONTRAST  Clinical history: Left leg weakness  INDICATION:   left leg weakness    COMPARISON: 11/25/2016. CONTRAST:  None. TECHNIQUE: Unenhanced CT of the head was performed using 5 mm images. Brain and  bone windows were generated.   CT dose reduction was achieved through use of a  standardized protocol tailored for this examination and automatic exposure  control for dose modulation. FINDINGS:  Extensive periventricular and scattered foci of increased T2 signal intensity  and corona radiata and centrum semiovale. Moderate area of encephalomalacia in  the posterior right frontal lobe consistent with remote infarction. Remote right  basal ganglia infarction. Remote right frontal infarction. .   The basilar  cisterns are open. No acute infarct is identified. The bone windows demonstrate  no abnormalities. The visualized portions of the paranasal sinuses and mastoid  air cells are clear. Impression IMPRESSION:     Severe chronic microvascular ischemic change with areas of encephalomalacia in  the right frontal lobe, right occipital lobe and right basal ganglia consistent  with remote infarction. Superimposed focus of acute ischemia cannot be excluded.             REVIEWED LABS:  Lab Results   Component Value Date/Time    WBC 4.9 09/04/2018 12:47 PM    HCT 36.8 09/04/2018 12:47 PM    HGB 11.9 09/04/2018 12:47 PM    PLATELET 685 59/59/5488 12:47 PM     Lab Results   Component Value Date/Time    Sodium 144 09/04/2018 12:47 PM    Potassium 3.9 09/04/2018 12:47 PM    Chloride 106 09/04/2018 12:47 PM    CO2 31 09/04/2018 12:47 PM    Glucose 116 (H) 09/04/2018 12:47 PM    BUN 58 (H) 09/04/2018 12:47 PM    Creatinine 3.50 (H) 09/04/2018 12:47 PM    Calcium 8.9 09/04/2018 12:47 PM     Lab Results   Component Value Date/Time    Hemoglobin A1c 7.3 (H) 11/25/2016 03:42 AM

## 2018-09-05 NOTE — DISCHARGE SUMMARY
Hospitalist Discharge Summary     Patient ID:  Marzetta Sever  [id number]  72 y.o.  1945    PCP on record: Anny Amezquita MD    Admit date: 9/4/2018  Discharge date and time: 9/5/2018      DISCHARGE DIAGNOSIS:  See below      CONSULTATIONS:  IP CONSULT TO HOSPITALIST  IP CONSULT TO NEUROLOGY    Excerpted HPI from H&P of Stewart Hernández MD:  Marzetta Sever is a 72 y.o.  female who presents with CC listed above. Pt lives at home with her  and is independent. She was using the bathroom this morning when she noticed that her left leg suddenly felt weak. She then felt as if she could not bear any weight on it and fell \"slowly\" to the ground. Pt denies any LOC or striking her head. She denies any muscular pain. Since initially feeling as if her leg was weak, pt also experienced some \"trembling. \" She denies any numbness or tingling. She does have a history of stroke. She denies any CP, nausea, vomiting, SOB, dizziness, fever or chills. ______________________________________________________________________  DISCHARGE SUMMARY/HOSPITAL COURSE:  for full details see H&P, daily progress notes, labs, consult notes. LLE weakness - likely due to TIA - new CVA ruled out  Hx of stroke with residual left eye blurry vision  CAD  DMII  HLD  CKD III at baseline  -Admit to Obs  -Neuro order set used  -Doppler, Echo, MRI  -PT/OT  -Neurology Consult  -A1c, Lipid panel  -Continue Aggrenox - of note pt was previously on Eliquis but this was discontinued last year - she was placed on Eliquis for DVT   -Hold Actos  -FS monitoring, sliding scale    9/5:  MRI without any acute lesions. Doppler study shows less than 70% stenosis b/l. Neurology consult appreciated. Echo completed. A1c and LDL acceptable. Continue secondary stroke prophylaxis with Aggrenox and statin. PT/OT evals appreciated.      Due to TIA diagnosis and [place] DMV law - pt advised that she should not be driving or operating any motor vehicles for the next 3 months.   _______________________________________________________________________  Patient seen and examined by me on discharge day. Pertinent Findings:  Gen:    Not in distress  Chest: Clear lungs  CVS:   Regular rhythm. No edema  Abd:  Soft, not distended, not tender  Neuro:  Alert, oriented x3, LLE motor str 5/5  _______________________________________________________________________  DISCHARGE MEDICATIONS:   Current Discharge Medication List      CONTINUE these medications which have NOT CHANGED    Details   ferrous sulfate 325 mg (65 mg iron) tablet Take 325 mg by mouth two (2) times a day.      ezetimibe (ZETIA) 10 mg tablet Take 10 mg by mouth daily. torsemide (DEMADEX) 100 mg tablet Take 100 mg by mouth daily. amLODIPine (NORVASC) 10 mg tablet Take 10 mg by mouth daily. pioglitazone (ACTOS) 15 mg tablet Take 15 mg by mouth daily. cloNIDine HCl (CATAPRES) 0.1 mg tablet Take 0.1 mg by mouth two (2) times a day. cyanocobalamin 1,000 mcg tablet Take 1,000 mcg by mouth daily. aspirin-dipyridamole (AGGRENOX)  mg per SR capsule Take 1 Cap by mouth two (2) times a day. insulin lispro protamine/insulin lispro (HUMALOG MIX 75-25) 100 unit/mL (75-25) injection 24 Units by SubCUTAneous route two (2) times a day. DULoxetine (CYMBALTA) 60 mg capsule Take 60 mg by mouth daily. atorvastatin (LIPITOR) 40 mg tablet Take 40 mg by mouth daily. FOLIC ACID/MV,FE,OTHER MIN (CENTRUM COMPLETE PO) Take 1 Tab by mouth daily. My Recommended Diet, Activity, Wound Care, and follow-up labs are listed in the patient's Discharge Insturctions which I have personally completed and reviewed.     ______________________________________________________________________    Risk of deterioration: Low    Condition at Discharge:  Stable  ______________________________________________________________________    Disposition  Home with family and home health services  ______________________________________________________________________    Care Plan discussed with:   Patient, Family, RN, Care Manager, Consultant    ______________________________________________________________________    Code Status: Full Code  ______________________________________________________________________      Follow up with:   PCP : Liya Parisi MD  Follow-up Information     Follow up With Details [address] [facility], [place]   [facility]  [address] [address] [facility]  [phone]                Total time in minutes spent coordinating this discharge (includes going over instructions, follow-up, prescriptions, and preparing report for sign off to her PCP) :  35 minutes    Signed:  Geraldine Butler MD

## 2018-09-05 NOTE — PROGRESS NOTES
[id number] [facility]  Preliminary Report:  Carotid Duplex Scan Right: 
Moderate plaque noted in the right carotid system. Right ICA velocities suggest less than 50% diameter reduction. Right vertebral artery flow is antegrade (resistive). Left: 
Severe plaque noted in the left carotid system. Left ICA velocities suggest 50-69% diameter reduction. Left vertebral artery flow is antegrade (resistive). Bilateral calcific plaque visualized at bifurcations, may be masking high velocities. Final report to follow.

## 2018-09-05 NOTE — PROGRESS NOTES
* No surgery found * 
* No surgery found * Bedside and Verbal shift change report given to [address] [place] [address] (oncoming nurse) by Oliver Casas (offgoing nurse). Report included the following information SBAR, Kardex, Recent Results and Med Rec Status. Zone Phone:   [phone] Significant changes during shift:  none Patient Information Batsheva Araiza 
72 y.o. 
9/4/2018  1:02 PM by Kindra Velasquez MD. Batsheva Araiza was admitted from Home 
 
Problem List 
 
Patient Active Problem List  
 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  
Diagnosis Date  Arthritis  CAD (coronary artery disease)  Diabetes ([address]  Hypertension  Stroke ([address] Core Measures: CVA: Yes Yes CHF:No No 
PNA:No No 
 
 
Activity Status: OOB to Chair Yes Ambulated this shift No  
Bed Rest No 
 
Supplemental O2: (If Applicable) NC No 
NRB No 
Venti-mask No 
On  Liters/min LINES AND DRAINS: 
 
Central Line? No Placement date  Reason Medically Necessary PICC LINE? No Placement date Reason Medically Necessary Urinary Catheter? No Placement Date  Reason Medically Necessary DVT prophylaxis: DVT prophylaxis Med- No 
DVT prophylaxis SCD or YOKO- No  
 
 Wounds: (If Applicable) Wounds- No 
 
Location Patient Safety: 
 
Falls Score Total Score: 3 Safety Level_______ Bed Alarm On? No 
Sitter? No 
 
Plan for upcoming shift: echo, duplex, pt/ot/slp Discharge Plan: No  
 
Active Consults: 
IP CONSULT TO HOSPITALIST 
IP CONSULT TO NEUROLOGY

## 2018-09-05 NOTE — ROUTINE PROCESS
Pt discharged. Discharge instructions reviewed with patient and family. Included medications and follow up appointments. All questions answered

## 2018-09-05 NOTE — PROGRESS NOTES
Reason for Admission:   Weakness of Left Leg RRAT Score:    23 Resources/supports as identified by patient/family:   Pt's  and family Top Challenges facing patient (as identified by patient/family and CM): Finances/Medication cost?      No issues Transportation?  drives Support system or lack thereof? Good family support Living arrangements? Lives with  in a 1 story home with 3 steps to the entrance Self-care/ADLs/Cognition? Independent with her ADL's and IADL's Current Advanced Directive/Advance Care Plan:  Full Code Plan for utilizing home health:    No, outpt PT Likelihood of readmission: low Transition of Care Plan:          Home with f/u appts and outpt PT Pt is a 72 y.o  [place] female admitted with Weakness of Left Leg. Pt was alert, oriented and in no distress with her  at the bedside. Demographic information verified and all is correct. Pt lives with her  in a 1 story home with 3 steps to the entrance. Pt states she is independent with her ADL's and IADL's. Pt doesn't drive and her  drives. Pt uses a cane only when she goes out of the home. Pt had inpt rehab and home health in the past after her stroke in 2007. Pt has been to outpt PT at [address] [place] [address] at the [facility] location. Preferred pharmacy is [facility] on [address] [facility]. CM discussed therapy's recommendation for outpt PT and pt was in agreement. Pt wants to make her own appt at the [address] [place] [address] rehab on [facility]. F/u appt made and documented on the discharge paperwork. Pt's  can transport pt home by car. Care Management Interventions PCP Verified by CM: Yes (Dr. Harjinder Matos ) Mode of Transport at Discharge: Other (see comment) (pt's family can transport by car) Transition of Care Consult (CM Consult): Discharge Planning Discharge Durable Medical Equipment: No (cane) Physical Therapy Consult: Yes Occupational Therapy Consult: Yes Speech Therapy Consult: Yes Current Support Network: Lives with Spouse, Own Home (lives with  in a 1 story home with 3 steps to the entrance) Confirm Follow Up Transport: Family Plan discussed with Pt/Family/Caregiver: Yes Discharge Location Discharge Placement: Home with outpatient services Anaya Cortes, Miguel Chu

## 2018-09-05 NOTE — PROGRESS NOTES
Please consider discontinuation of duloxetine (Cymbalta) or outpatient follow up. Duloxetine is not recommended in patients with CrCl < 30 ml/min. There are no dose adjustment recommendations unfortunately. Thanks, Ariel Morrison, PHARMD

## 2018-09-05 NOTE — PROGRESS NOTES
Problem: Falls - Risk of 
Goal: *Absence of Falls Document Jasmyn Leonard Fall Risk and appropriate interventions in the flowsheet. Outcome: Progressing Towards Goal 
Fall Risk Interventions: 
Mobility Interventions: Patient to call before getting OOB, Communicate number of staff needed for ambulation/transfer Medication Interventions: Bed/chair exit alarm, Evaluate medications/consider consulting pharmacy, Patient to call before getting OOB Elimination Interventions: Call light in reach, Patient to call for help with toileting needs History of Falls Interventions: Door open when patient unattended, Evaluate medications/consider consulting pharmacy

## 2018-09-05 NOTE — PROCEDURES
[facility]  *** FINAL REPORT ***    Name: Colette Adams  MRN: [id number]    Inpatient  : 01 Sep 1945  HIS Order #: [id number]  [id number] [facility] Visit #: [id number]  Date: 05 Sep 2018    TYPE OF TEST: Cerebrovascular Duplex    REASON FOR TEST  R/O Stroke    Right Carotid:-             Proximal               Mid                 Distal  cm/s  Systolic  Diastolic  Systolic  Diastolic  Systolic  Diastolic  CCA:    588.1      13.0                            71.0       9.0  Bulb:  ECA:    115.0       0.0  ICA:     51.0       4.0       47.0       7.0       60.0      11.0  ICA/CCA:  0.7       0.4    ICA Stenosis: <50%    Right Vertebral:-  Finding: Resistant  Sys:       76.0  Neha:        0.0    Right Subclavian: Normal    Left Carotid:-            Proximal                Mid                 Distal  cm/s  Systolic  Diastolic  Systolic  Diastolic  Systolic  Diastolic  CCA:     64.6       8.0                            66.0       5.0  Bulb:  ECA:    156.0       0.0  ICA:    179.0      27.0      107.0      24.0       70.0      15.0  ICA/CCA:  2.7       5.4    ICA Stenosis: 50-69%    Left Vertebral:-  Finding: Resistant  Sys:       42.0  Neha:        0.0    Left Subclavian: Normal    INTERPRETATION/FINDINGS  PROCEDURE:  Carotid Duplex Examination using B-mode, color and  spectral Doppler of the extracranial cerebrovascular arteries. 1. <50% stenosis in the right internal carotid artery. 2. 50-69% stenosis in the left internal carotid artery. 3. No significant stenosis in the external carotid arteries  bilaterally. 4. Resistant flow in both vertebral arteries. 5. Normal flow in both subclavian arteries. Plaque Morphology:  1. Calcific plaque in the bulb and right ICA. 2. Calcific plaque in the bulb and left ICA. ADDITIONAL COMMENTS    Bilateral calcific plaque visualized at bifurcations, may be masking  higher velocities. Retrospective Comparison:  1.  There has been a significant increase in the degree of stenosis in  the left internal carotid artery as compared to the previous exam  11/25/16. I have personally reviewed the data relevant to the interpretation of  this  study. TECHNOLOGIST: Angelia Valencia. IRVIN Becerra, RDMS  Signed: 09/05/2018 11:01 AM    PHYSICIAN: Conor Kim MD  Signed: 09/05/2018 08:27 PM

## 2019-07-14 ENCOUNTER — HOSPITAL ENCOUNTER (EMERGENCY)
Age: 74
Discharge: HOME | End: 2019-07-14
Attending: EMERGENCY MEDICINE
Payer: MEDICARE

## 2019-07-14 ENCOUNTER — APPOINTMENT (OUTPATIENT)
Dept: CT IMAGING | Age: 74
End: 2019-07-14
Attending: EMERGENCY MEDICINE
Payer: MEDICARE

## 2019-07-14 VITALS
BODY MASS INDEX: 31.53 KG/M2 | WEIGHT: 167 LBS | HEART RATE: 84 BPM | OXYGEN SATURATION: 99 % | HEIGHT: 61 IN | TEMPERATURE: 97.7 F | DIASTOLIC BLOOD PRESSURE: 65 MMHG | RESPIRATION RATE: 24 BRPM | SYSTOLIC BLOOD PRESSURE: 157 MMHG

## 2019-07-14 DIAGNOSIS — E16.2 HYPOGLYCEMIA: Primary | ICD-10-CM

## 2019-07-14 LAB
ALBUMIN SERPL-MCNC: 3.6 G/DL (ref 3.5–5)
ALBUMIN/GLOB SERPL: 1 {RATIO} (ref 1.1–2.2)
ALP SERPL-CCNC: 107 U/L (ref 45–117)
ALT SERPL-CCNC: 46 U/L (ref 12–78)
ANION GAP SERPL CALC-SCNC: 6 MMOL/L (ref 5–15)
APPEARANCE UR: CLEAR
AST SERPL-CCNC: 51 U/L (ref 15–37)
BACTERIA URNS QL MICRO: NEGATIVE /HPF
BASOPHILS # BLD: 0 K/UL (ref 0–0.1)
BASOPHILS NFR BLD: 0 % (ref 0–1)
BILIRUB SERPL-MCNC: 0.3 MG/DL (ref 0.2–1)
BILIRUB UR QL: NEGATIVE
BUN SERPL-MCNC: 65 MG/DL (ref 6–20)
BUN/CREAT SERPL: 19 (ref 12–20)
CALCIUM SERPL-MCNC: 8.7 MG/DL (ref 8.5–10.1)
CHLORIDE SERPL-SCNC: 105 MMOL/L (ref 97–108)
CO2 SERPL-SCNC: 30 MMOL/L (ref 21–32)
COLOR UR: ABNORMAL
COMMENT, HOLDF: NORMAL
CREAT SERPL-MCNC: 3.46 MG/DL (ref 0.55–1.02)
DIFFERENTIAL METHOD BLD: ABNORMAL
EOSINOPHIL # BLD: 0.1 K/UL (ref 0–0.4)
EOSINOPHIL NFR BLD: 1 % (ref 0–7)
EPITH CASTS URNS QL MICRO: ABNORMAL /LPF
ERYTHROCYTE [DISTWIDTH] IN BLOOD BY AUTOMATED COUNT: 20.5 % (ref 11.5–14.5)
GLOBULIN SER CALC-MCNC: 3.6 G/DL (ref 2–4)
GLUCOSE BLD STRIP.AUTO-MCNC: 113 MG/DL (ref 65–100)
GLUCOSE BLD STRIP.AUTO-MCNC: 186 MG/DL (ref 65–100)
GLUCOSE BLD STRIP.AUTO-MCNC: 40 MG/DL (ref 65–100)
GLUCOSE BLD STRIP.AUTO-MCNC: 48 MG/DL (ref 65–100)
GLUCOSE BLD STRIP.AUTO-MCNC: 48 MG/DL (ref 65–100)
GLUCOSE BLD STRIP.AUTO-MCNC: 77 MG/DL (ref 65–100)
GLUCOSE SERPL-MCNC: 177 MG/DL (ref 65–100)
GLUCOSE UR STRIP.AUTO-MCNC: NEGATIVE MG/DL
HCT VFR BLD AUTO: 34.4 % (ref 35–47)
HGB BLD-MCNC: 11.1 G/DL (ref 11.5–16)
HGB UR QL STRIP: ABNORMAL
HYALINE CASTS URNS QL MICRO: ABNORMAL /LPF (ref 0–5)
IMM GRANULOCYTES # BLD AUTO: 0 K/UL (ref 0–0.04)
IMM GRANULOCYTES NFR BLD AUTO: 0 % (ref 0–0.5)
KETONES UR QL STRIP.AUTO: NEGATIVE MG/DL
LEUKOCYTE ESTERASE UR QL STRIP.AUTO: NEGATIVE
LYMPHOCYTES # BLD: 0.5 K/UL (ref 0.8–3.5)
LYMPHOCYTES NFR BLD: 6 % (ref 12–49)
MCH RBC QN AUTO: 27.1 PG (ref 26–34)
MCHC RBC AUTO-ENTMCNC: 32.3 G/DL (ref 30–36.5)
MCV RBC AUTO: 83.9 FL (ref 80–99)
MONOCYTES # BLD: 0.8 K/UL (ref 0–1)
MONOCYTES NFR BLD: 10 % (ref 5–13)
NEUTS SEG # BLD: 6.3 K/UL (ref 1.8–8)
NEUTS SEG NFR BLD: 83 % (ref 32–75)
NITRITE UR QL STRIP.AUTO: NEGATIVE
NRBC # BLD: 0 K/UL (ref 0–0.01)
NRBC BLD-RTO: 0 PER 100 WBC
PH UR STRIP: 6.5 [PH] (ref 5–8)
PLATELET # BLD AUTO: 154 K/UL (ref 150–400)
PMV BLD AUTO: ABNORMAL FL (ref 8.9–12.9)
POTASSIUM SERPL-SCNC: 4.4 MMOL/L (ref 3.5–5.1)
PROT SERPL-MCNC: 7.2 G/DL (ref 6.4–8.2)
PROT UR STRIP-MCNC: ABNORMAL MG/DL
RBC # BLD AUTO: 4.1 M/UL (ref 3.8–5.2)
RBC #/AREA URNS HPF: ABNORMAL /HPF (ref 0–5)
RBC MORPH BLD: ABNORMAL
SAMPLES BEING HELD,HOLD: NORMAL
SERVICE CMNT-IMP: ABNORMAL
SERVICE CMNT-IMP: NORMAL
SODIUM SERPL-SCNC: 141 MMOL/L (ref 136–145)
SP GR UR REFRACTOMETRY: 1.01 (ref 1–1.03)
TROPONIN I SERPL-MCNC: 0.07 NG/ML
UA: UC IF INDICATED,UAUC: ABNORMAL
UROBILINOGEN UR QL STRIP.AUTO: 0.2 EU/DL (ref 0.2–1)
WBC # BLD AUTO: 7.7 K/UL (ref 3.6–11)
WBC URNS QL MICRO: ABNORMAL /HPF (ref 0–4)

## 2019-07-14 PROCEDURE — 81001 URINALYSIS AUTO W/SCOPE: CPT

## 2019-07-14 PROCEDURE — 82962 GLUCOSE BLOOD TEST: CPT

## 2019-07-14 PROCEDURE — 84484 ASSAY OF TROPONIN QUANT: CPT

## 2019-07-14 PROCEDURE — 80053 COMPREHEN METABOLIC PANEL: CPT

## 2019-07-14 PROCEDURE — 36415 COLL VENOUS BLD VENIPUNCTURE: CPT

## 2019-07-14 PROCEDURE — 85025 COMPLETE CBC W/AUTO DIFF WBC: CPT

## 2019-07-14 PROCEDURE — 99285 EMERGENCY DEPT VISIT HI MDM: CPT

## 2019-07-14 RX ORDER — DEXTROSE MONOHYDRATE 100 MG/ML
125-250 INJECTION, SOLUTION INTRAVENOUS AS NEEDED
Status: DISCONTINUED | OUTPATIENT
Start: 2019-07-14 | End: 2019-07-14 | Stop reason: HOSPADM

## 2019-07-14 RX ORDER — DEXTROSE 50 % IN WATER (D50W) INTRAVENOUS SYRINGE
50
Status: DISCONTINUED | OUTPATIENT
Start: 2019-07-14 | End: 2019-07-14

## 2019-07-14 NOTE — ED NOTES
Pt arrives ambulatory to the ED with c/o aphasia xseveral weeks with an episode diaphoresis this morning and \"unable to say what i'm thinking\"    Pt placed x3 on monitor,  at bedside.  Pt is A/O x4

## 2019-07-14 NOTE — DISCHARGE INSTRUCTIONS
Patient Education        Hypoglycemia: Care Instructions  Your Care Instructions    Hypoglycemia means that your blood sugar is low and your body is not getting enough fuel. Some people get low blood sugar from not eating often enough. Some medicines to treat diabetes can cause low blood sugar. People who have had surgery on their stomachs or intestines may get hypoglycemia. Problems with the pancreas, kidneys, or liver also can cause low blood sugar. A snack or drink with sugar in it will raise your blood sugar and should ease your symptoms right away. Your doctor may recommend that you change or stop your medicines until you can get your blood sugar levels under control. In the long run, you may need to change your diet and eating habits so that you get enough fuel for your body throughout the day. Follow-up care is a key part of your treatment and safety. Be sure to make and go to all appointments, and call your doctor if you are having problems. It's also a good idea to know your test results and keep a list of the medicines you take. How can you care for yourself at home? · Learn to recognize the early signs of low blood sugar. Signs include:  ? Nausea. ? Hunger. ? Feeling nervous, irritable, or shaky. ? Cold, clammy, wet skin. ? Sweating (when you are not exercising). ? A fast heartbeat.  ? Numbness or tingling of the fingertips or lips. · If you feel an episode of low blood sugar coming on, drink fruit juice or sugared (not diet) soda, or eat sugar in the form of candy, cubes, or tablets. [employer] are another [employer]. · Eat small, frequent meals so that you do not get too hungry between meals. · Balance extra exercise with eating more. · Keep a written record of your low blood sugar episodes, including when you last ate and what you ate, so that you can learn what causes your blood sugar to drop.   · Make sure your family, friends, and coworkers know the symptoms of low blood sugar and know what to do to get your sugar level up. · Wear medical alert jewelry that lists your condition. You can buy this at most drugstores. When should you call for help? Call 911 anytime you think you may need emergency care. For example, call if:    · You passed out (lost consciousness).     · You are confused or cannot think clearly.     · Your blood sugar is very high or very low.    Watch closely for changes in your health, and be sure to contact your doctor if:    · Your blood sugar stays outside the level your doctor set for you.     · You have any problems. Where can you learn more? Go to http://yue-fitz.info/. Enter Y267 in the search box to learn more about \"Hypoglycemia: Care Instructions. \"  Current as of: July 25, 2018  Content Version: 11.9  © 5128-6893 [employer]. Care instructions adapted under license by [employer] (which disclaims liability or warranty for this information). If you have questions about a medical condition or this instruction, always ask your healthcare professional. [address] any warranty or liability for your use of this information.

## 2019-07-14 NOTE — ED NOTES
Dr. Zully Luo reviewed discharge instructions with the patient. The patient verbalized understanding. All questions and concerns were addressed. The patient is discharged via wheelchair in the care of family members with instructions and prescriptions in hand. Pt is alert and oriented x 4. Respirations are clear and unlabored.

## 2019-07-14 NOTE — ED PROVIDER NOTES
EMERGENCY DEPARTMENT HISTORY AND PHYSICAL EXAM      Date: 7/14/2019  Patient Name: Ronn Adams  Patient Age and Sex: 72 y.o. female     History of Presenting Illness     Chief Complaint   Patient presents with   Duane Alu     presents with , who reports pt has been speaking in broken sentences that are difficult to understand x several weeks. Pt states she has the thought but has difficulty getting them into words, pt reports x several weeks. History Provided By: Patient    HPI: Ronn Adams is a 68-year-old female with a history of diabetes presenting with diaphoresis. Patient states that she woke up this morning and felt very sweaty and clammy. According to , she woke her up saying that she was not feeling well and that she was very sweaty. Patient denies any headaches, nausea, vomiting, slurred speech, numbness, weakness, chest pain or abdominal pain. Patient states that she does have diabetes and is on insulin. Has not eaten anything this morning and has not taken her insulin yet this morning. When I asked about difficulty getting words out as that was reported in triage note, they denied it. There are no other complaints, changes, or physical findings at this time. PCP: Sybil Santana MD    No current facility-administered medications on file prior to encounter. Current Outpatient Medications on File Prior to Encounter   Medication Sig Dispense Refill    insulin lispro protamine/insulin lispro (HUMALOG MIX 75-25) 100 unit/mL (75-25) injection 24 Units by SubCUTAneous route two (2) times a day.  ferrous sulfate 325 mg (65 mg iron) tablet Take 325 mg by mouth two (2) times a day.  ezetimibe (ZETIA) 10 mg tablet Take 10 mg by mouth daily.  torsemide (DEMADEX) 100 mg tablet Take 100 mg by mouth daily.  amLODIPine (NORVASC) 10 mg tablet Take 10 mg by mouth daily.  pioglitazone (ACTOS) 15 mg tablet Take 15 mg by mouth daily.       cloNIDine HCl (CATAPRES) 0.1 mg tablet Take 0.1 mg by mouth two (2) times a day.  cyanocobalamin 1,000 mcg tablet Take 1,000 mcg by mouth daily.  aspirin-dipyridamole (AGGRENOX)  mg per SR capsule Take 1 Cap by mouth two (2) times a day.  DULoxetine (CYMBALTA) 60 mg capsule Take 60 mg by mouth daily.  atorvastatin (LIPITOR) 40 mg tablet Take 40 mg by mouth daily.  FOLIC ACID/MV,FE,OTHER MIN (CENTRUM COMPLETE PO) Take 1 Tab by mouth daily. Past History     Past Medical History:  Past Medical History:   Diagnosis Date    Arthritis     CAD (coronary artery disease)     Diabetes ([facility]     Hypertension     Stroke [facility])        Past Surgical History:  Past Surgical History:   Procedure Laterality Date    COLONOSCOPY N/A 2/16/2018    COLONOSCOPY performed by Ever Hodgson MD at [facility] END[facility]COPY    HX GYN      HX ORTHOPAEDIC      right knee replacement       Family History:  History reviewed. No pertinent family history. Social History:  Social History     Tobacco Use    Smoking status: Never Smoker    Smokeless tobacco: Never Used   Substance Use Topics    Alcohol use: Yes     Comment: rarely     Drug use: No       Allergies:  No Known Allergies      Review of Systems   Review of Systems   Constitutional: Positive for diaphoresis and fatigue. Negative for chills and fever. Respiratory: Negative for cough and shortness of breath. Cardiovascular: Negative for chest pain. Gastrointestinal: Negative for constipation, diarrhea, nausea and vomiting. Endocrine: Positive for polyuria. Polyphagia: bc drinking lots of water. Genitourinary: Negative for dysuria. Neurological: Negative for weakness and numbness. All other systems reviewed and are negative. Physical Exam   Physical Exam   Constitutional: She is oriented to person, place, and time. She appears well-developed and well-nourished. HENT:   Head: Normocephalic and atraumatic.    Eyes: Conjunctivae and EOM are normal.   Neck: Normal range of motion. Neck supple. Cardiovascular: Normal rate and regular rhythm. Pulmonary/Chest: Effort normal and breath sounds normal. No respiratory distress. She has no wheezes. Abdominal: Soft. She exhibits no distension. There is no tenderness. There is no rebound. Musculoskeletal: Normal range of motion. Neurological: She is alert and oriented to person, place, and time. Skin: Skin is warm and dry. Psychiatric: She has a normal mood and affect. Nursing note and vitals reviewed.        Diagnostic Study Results     Labs -     Recent Results (from the past 12 hour(s))   GLUCOSE, POC    Collection Time: 07/14/19  6:08 AM   Result Value Ref Range    Glucose (POC) 40 (LL) 65 - 100 mg/dL    Performed by [employer]    GLUCOSE, POC    Collection Time: 07/14/19  6:11 AM   Result Value Ref Range    Glucose (POC) 48 (LL) 65 - 100 mg/dL    Performed by [employer]    GLUCOSE, POC    Collection Time: 07/14/19  6:26 AM   Result Value Ref Range    Glucose (POC) 48 (LL) 65 - 100 mg/dL    Performed by [employer]    GLUCOSE, POC    Collection Time: 07/14/19  6:44 AM   Result Value Ref Range    Glucose (POC) 77 65 - 100 mg/dL    Performed by [employer]    GLUCOSE, POC    Collection Time: 07/14/19  7:02 AM   Result Value Ref Range    Glucose (POC) 113 (H) 65 - 100 mg/dL    Performed by [employer]    CBC WITH AUTOMATED DIFF    Collection Time: 07/14/19  7:05 AM   Result Value Ref Range    WBC 7.7 3.6 - 11.0 K/uL    RBC 4.10 3.80 - 5.20 M/uL    HGB 11.1 (L) 11.5 - 16.0 g/dL    HCT 34.4 (L) 35.0 - 47.0 %    MCV 83.9 80.0 - 99.0 FL    MCH 27.1 26.0 - 34.0 PG    MCHC 32.3 30.0 - 36.5 g/dL    RDW 20.5 (H) 11.5 - 14.5 %    PLATELET 395 378 - 831 K/uL    MPV ABNORMAL 8.9 - 12.9 FL    NRBC 0.0 0  WBC    ABSOLUTE NRBC 0.00 0.00 - 0.01 K/uL    NEUTROPHILS PENDING %    LYMPHOCYTES PENDING %    MONOCYTES PENDING %    EOSINOPHILS PENDING %    BASOPHILS PENDING %    IMMATURE GRANULOCYTES PENDING % ABS. NEUTROPHILS PENDING K/UL    ABS. LYMPHOCYTES PENDING K/UL    ABS. MONOCYTES PENDING K/UL    ABS. EOSINOPHILS PENDING K/UL    ABS. BASOPHILS PENDING K/UL    ABS. IMM. GRANS. PENDING K/UL    DF PENDING    URINALYSIS W/ REFLEX CULTURE    Collection Time: 07/14/19  7:05 AM   Result Value Ref Range    Color YELLOW/STRAW      Appearance CLEAR CLEAR      Specific gravity 1.009 1.003 - 1.030      pH (UA) 6.5 5.0 - 8.0      Protein TRACE (A) NEG mg/dL    Glucose NEGATIVE  NEG mg/dL    Ketone NEGATIVE  NEG mg/dL    Bilirubin NEGATIVE  NEG      Blood TRACE (A) NEG      Urobilinogen 0.2 0.2 - 1.0 EU/dL    Nitrites NEGATIVE  NEG      Leukocyte Esterase NEGATIVE  NEG      WBC 0-4 0 - 4 /hpf    RBC 0-5 0 - 5 /hpf    Epithelial cells FEW FEW /lpf    Bacteria NEGATIVE  NEG /hpf    UA:UC IF INDICATED PENDING     Hyaline cast 0-2 0 - 5 /lpf   TROPONIN I    Collection Time: 07/14/19  7:05 AM   Result Value Ref Range    Troponin-I, Qt. 0.07 (H) <4.91 ng/mL   METABOLIC PANEL, COMPREHENSIVE    Collection Time: 07/14/19  7:05 AM   Result Value Ref Range    Sodium 141 136 - 145 mmol/L    Potassium 4.4 3.5 - 5.1 mmol/L    Chloride 105 97 - 108 mmol/L    CO2 30 21 - 32 mmol/L    Anion gap 6 5 - 15 mmol/L    Glucose 177 (H) 65 - 100 mg/dL    BUN 65 (H) 6 - 20 MG/DL    Creatinine 3.46 (H) 0.55 - 1.02 MG/DL    BUN/Creatinine ratio 19 12 - 20      GFR est AA 16 (L) >60 ml/min/1.73m2    GFR est non-AA 13 (L) >60 ml/min/1.73m2    Calcium 8.7 8.5 - 10.1 MG/DL    Bilirubin, total 0.3 0.2 - 1.0 MG/DL    ALT (SGPT) 46 12 - 78 U/L    AST (SGOT) 51 (H) 15 - 37 U/L    Alk.  phosphatase 107 45 - 117 U/L    Protein, total 7.2 6.4 - 8.2 g/dL    Albumin 3.6 3.5 - 5.0 g/dL    Globulin 3.6 2.0 - 4.0 g/dL    A-G Ratio 1.0 (L) 1.1 - 2.2     SAMPLES BEING HELD    Collection Time: 07/14/19  7:05 AM   Result Value Ref Range    SAMPLES BEING HELD BLUE     COMMENT        Add-on orders for these samples will be processed based on acceptable specimen integrity and analyte stability, which may vary by analyte. Radiologic Studies -   No orders to display     CT Results  (Last 48 hours)    None        CXR Results  (Last 48 hours)    None            Medical Decision Making   I am the first provider for this patient. I reviewed the vital signs, available nursing notes, past medical history, past surgical history, family history and social history. Vital Signs-Reviewed the patient's vital signs. Patient Vitals for the past 12 hrs:   Temp Pulse Resp BP SpO2   07/14/19 0712  85 15 177/68 98 %   07/14/19 0559 97.7 °F (36.5 °C) 66 15 147/65 100 %       Records Reviewed: Nursing Notes and Old Medical Records    Provider Notes (Medical Decision Making):   Patient presenting with night sweats and diaphoresis this morning. Differential includes hypoglycemia, ACS, electrolyte abnormality, infection, dehydration. We will get lab work including urinalysis and glucose. 6:21 AM  Glucose was low at 40. Advised D50 and juice and food. Likely cause of her symptoms. ED Course:   Initial assessment performed. The patients presenting problems have been discussed, and they are in agreement with the care plan formulated and outlined with them. I have encouraged them to ask questions as they arise throughout their visit. ED Course as of Jul 14 0754   Sun Jul 14, 2019   0750 Her  troponin is slightly elevated, but looking at her prior troponins have always been elevated. Her Cr also high but baseline. Is following with nephrology. [JS]      ED Course User Index  [JS] Segundo Duval MD     Critical Care Time:   0    Disposition:  Discharge Note:  The patient has been re-evaluated and is ready for discharge. Reviewed available results with patient. Counseled patient on diagnosis and care plan. Patient has expressed understanding, and all questions have been answered.  Patient agrees with plan and agrees to follow up as recommended, or to return to the ED if their symptoms worsen. Discharge instructions have been provided and explained to the patient, along with reasons to return to the ED. PLAN:  Current Discharge Medication List        2. Follow-up Information     Follow up With Specialties Details Why Contact Deja Holley MD Internal Medicine  As needed [facility]  [address]  [phone]          3. Return to ED if worse     Diagnosis     Clinical Impression:   1. Hypoglycemia        Attestations:    Annel Madrigal M.D. Please note that this dictation was completed with [employer], the computer voice recognition software. Quite often unanticipated grammatical, syntax, homophones, and other interpretive errors are inadvertently transcribed by the computer software. Please disregard these errors. Please excuse any errors that have escaped final proofreading. Thank you.

## 2019-07-14 NOTE — ED NOTES
Bedside and Verbal shift change report given to Giles Mack RN (oncoming nurse) by Tesha Jordan RN (offgoing nurse). Report included the following information SBAR, ED Summary, MAR and Recent Results.

## 2019-07-30 ENCOUNTER — HOSPITAL ENCOUNTER (OUTPATIENT)
Dept: CT IMAGING | Age: 74
Discharge: HOME | End: 2019-07-30
Attending: OPHTHALMOLOGY
Payer: MEDICARE

## 2019-07-30 DIAGNOSIS — H05.241 CONSTANT EXOPHTHALMOS, RIGHT EYE: ICD-10-CM

## 2019-07-30 DIAGNOSIS — H05.242 CONSTANT EXOPHTHALMOS, LEFT EYE: ICD-10-CM

## 2019-07-30 PROCEDURE — 70480 CT ORBIT/EAR/FOSSA W/O DYE: CPT

## 2019-11-13 ENCOUNTER — HOSPITAL ENCOUNTER (INPATIENT)
Age: 74
LOS: 3 days | Discharge: HOME HEALTH | DRG: 683 | End: 2019-11-16
Attending: EMERGENCY MEDICINE | Admitting: INTERNAL MEDICINE
Payer: MEDICARE

## 2019-11-13 DIAGNOSIS — N17.9 AKI (ACUTE KIDNEY INJURY) (HCC): Primary | ICD-10-CM

## 2019-11-13 PROBLEM — E87.6 HYPOKALEMIA: Status: ACTIVE | Noted: 2019-11-13

## 2019-11-13 LAB
ALBUMIN SERPL-MCNC: 3.2 G/DL (ref 3.5–5)
ALBUMIN/GLOB SERPL: 0.9 {RATIO} (ref 1.1–2.2)
ALP SERPL-CCNC: 110 U/L (ref 45–117)
ALT SERPL-CCNC: 38 U/L (ref 12–78)
ANION GAP SERPL CALC-SCNC: 10 MMOL/L (ref 5–15)
APPEARANCE UR: CLEAR
AST SERPL-CCNC: 32 U/L (ref 15–37)
BACTERIA URNS QL MICRO: ABNORMAL /HPF
BASOPHILS # BLD: 0 K/UL (ref 0–0.1)
BASOPHILS NFR BLD: 0 % (ref 0–1)
BILIRUB SERPL-MCNC: 0.4 MG/DL (ref 0.2–1)
BILIRUB UR QL: NEGATIVE
BUN SERPL-MCNC: 141 MG/DL (ref 6–20)
BUN/CREAT SERPL: 25 (ref 12–20)
CALCIUM SERPL-MCNC: 9 MG/DL (ref 8.5–10.1)
CHLORIDE SERPL-SCNC: 98 MMOL/L (ref 97–108)
CO2 SERPL-SCNC: 32 MMOL/L (ref 21–32)
COLOR UR: ABNORMAL
CREAT SERPL-MCNC: 5.58 MG/DL (ref 0.55–1.02)
DIFFERENTIAL METHOD BLD: ABNORMAL
EOSINOPHIL # BLD: 0.1 K/UL (ref 0–0.4)
EOSINOPHIL NFR BLD: 1 % (ref 0–7)
EPITH CASTS URNS QL MICRO: ABNORMAL /LPF
ERYTHROCYTE [DISTWIDTH] IN BLOOD BY AUTOMATED COUNT: 16.8 % (ref 11.5–14.5)
GLOBULIN SER CALC-MCNC: 3.7 G/DL (ref 2–4)
GLUCOSE BLD STRIP.AUTO-MCNC: 109 MG/DL (ref 65–100)
GLUCOSE SERPL-MCNC: 166 MG/DL (ref 65–100)
GLUCOSE UR STRIP.AUTO-MCNC: NEGATIVE MG/DL
HCT VFR BLD AUTO: 31.1 % (ref 35–47)
HGB BLD-MCNC: 10 G/DL (ref 11.5–16)
HGB UR QL STRIP: NEGATIVE
HYALINE CASTS URNS QL MICRO: ABNORMAL /LPF (ref 0–5)
IMM GRANULOCYTES # BLD AUTO: 0 K/UL (ref 0–0.04)
IMM GRANULOCYTES NFR BLD AUTO: 0 % (ref 0–0.5)
KETONES UR QL STRIP.AUTO: NEGATIVE MG/DL
LEUKOCYTE ESTERASE UR QL STRIP.AUTO: ABNORMAL
LYMPHOCYTES # BLD: 1.1 K/UL (ref 0.8–3.5)
LYMPHOCYTES NFR BLD: 14 % (ref 12–49)
MCH RBC QN AUTO: 28 PG (ref 26–34)
MCHC RBC AUTO-ENTMCNC: 32.2 G/DL (ref 30–36.5)
MCV RBC AUTO: 87.1 FL (ref 80–99)
MONOCYTES # BLD: 0.9 K/UL (ref 0–1)
MONOCYTES NFR BLD: 12 % (ref 5–13)
NEUTS SEG # BLD: 5.6 K/UL (ref 1.8–8)
NEUTS SEG NFR BLD: 72 % (ref 32–75)
NITRITE UR QL STRIP.AUTO: NEGATIVE
NRBC # BLD: 0 K/UL (ref 0–0.01)
NRBC BLD-RTO: 0 PER 100 WBC
PH UR STRIP: 7 [PH] (ref 5–8)
PLATELET # BLD AUTO: 151 K/UL (ref 150–400)
POTASSIUM SERPL-SCNC: 3 MMOL/L (ref 3.5–5.1)
PROT SERPL-MCNC: 6.9 G/DL (ref 6.4–8.2)
PROT UR STRIP-MCNC: ABNORMAL MG/DL
RBC # BLD AUTO: 3.57 M/UL (ref 3.8–5.2)
RBC #/AREA URNS HPF: ABNORMAL /HPF (ref 0–5)
SERVICE CMNT-IMP: ABNORMAL
SODIUM SERPL-SCNC: 140 MMOL/L (ref 136–145)
SP GR UR REFRACTOMETRY: 1.01 (ref 1–1.03)
TSH SERPL DL<=0.05 MIU/L-ACNC: 1.36 UIU/ML (ref 0.36–3.74)
UA: UC IF INDICATED,UAUC: ABNORMAL
UROBILINOGEN UR QL STRIP.AUTO: 0.2 EU/DL (ref 0.2–1)
WBC # BLD AUTO: 7.7 K/UL (ref 3.6–11)
WBC URNS QL MICRO: ABNORMAL /HPF (ref 0–4)

## 2019-11-13 PROCEDURE — 85025 COMPLETE CBC W/AUTO DIFF WBC: CPT

## 2019-11-13 PROCEDURE — 82962 GLUCOSE BLOOD TEST: CPT

## 2019-11-13 PROCEDURE — 74011250637 HC RX REV CODE- 250/637: Performed by: INTERNAL MEDICINE

## 2019-11-13 PROCEDURE — 65270000029 HC RM PRIVATE

## 2019-11-13 PROCEDURE — 87086 URINE CULTURE/COLONY COUNT: CPT

## 2019-11-13 PROCEDURE — 81001 URINALYSIS AUTO W/SCOPE: CPT

## 2019-11-13 PROCEDURE — 74011250636 HC RX REV CODE- 250/636: Performed by: INTERNAL MEDICINE

## 2019-11-13 PROCEDURE — 99285 EMERGENCY DEPT VISIT HI MDM: CPT

## 2019-11-13 PROCEDURE — 36415 COLL VENOUS BLD VENIPUNCTURE: CPT

## 2019-11-13 PROCEDURE — 80053 COMPREHEN METABOLIC PANEL: CPT

## 2019-11-13 PROCEDURE — 84443 ASSAY THYROID STIM HORMONE: CPT

## 2019-11-13 RX ORDER — SODIUM CHLORIDE 0.9 % (FLUSH) 0.9 %
5-40 SYRINGE (ML) INJECTION AS NEEDED
Status: DISCONTINUED | OUTPATIENT
Start: 2019-11-13 | End: 2019-11-16 | Stop reason: HOSPADM

## 2019-11-13 RX ORDER — DULOXETIN HYDROCHLORIDE 30 MG/1
60 CAPSULE, DELAYED RELEASE ORAL DAILY
Status: DISCONTINUED | OUTPATIENT
Start: 2019-11-14 | End: 2019-11-16 | Stop reason: HOSPADM

## 2019-11-13 RX ORDER — LANOLIN ALCOHOL/MO/W.PET/CERES
1000 CREAM (GRAM) TOPICAL DAILY
Status: DISCONTINUED | OUTPATIENT
Start: 2019-11-14 | End: 2019-11-16 | Stop reason: HOSPADM

## 2019-11-13 RX ORDER — SODIUM CHLORIDE 9 MG/ML
125 INJECTION, SOLUTION INTRAVENOUS CONTINUOUS
Status: DISPENSED | OUTPATIENT
Start: 2019-11-13 | End: 2019-11-14

## 2019-11-13 RX ORDER — POTASSIUM CHLORIDE 20 MEQ/1
40 TABLET, EXTENDED RELEASE ORAL
Status: COMPLETED | OUTPATIENT
Start: 2019-11-13 | End: 2019-11-13

## 2019-11-13 RX ORDER — MAGNESIUM SULFATE 100 %
4 CRYSTALS MISCELLANEOUS AS NEEDED
Status: DISCONTINUED | OUTPATIENT
Start: 2019-11-13 | End: 2019-11-16 | Stop reason: HOSPADM

## 2019-11-13 RX ORDER — LANOLIN ALCOHOL/MO/W.PET/CERES
325 CREAM (GRAM) TOPICAL 2 TIMES DAILY
Status: DISCONTINUED | OUTPATIENT
Start: 2019-11-13 | End: 2019-11-16 | Stop reason: HOSPADM

## 2019-11-13 RX ORDER — SODIUM CHLORIDE 0.9 % (FLUSH) 0.9 %
5-40 SYRINGE (ML) INJECTION EVERY 8 HOURS
Status: DISCONTINUED | OUTPATIENT
Start: 2019-11-13 | End: 2019-11-16 | Stop reason: HOSPADM

## 2019-11-13 RX ORDER — DEXTROSE 50 % IN WATER (D50W) INTRAVENOUS SYRINGE
12.5-25 AS NEEDED
Status: DISCONTINUED | OUTPATIENT
Start: 2019-11-13 | End: 2019-11-16 | Stop reason: HOSPADM

## 2019-11-13 RX ORDER — ASPIRIN AND DIPYRIDAMOLE 25; 200 MG/1; MG/1
1 CAPSULE, EXTENDED RELEASE ORAL 2 TIMES DAILY
Status: DISCONTINUED | OUTPATIENT
Start: 2019-11-13 | End: 2019-11-16 | Stop reason: HOSPADM

## 2019-11-13 RX ORDER — ACETAMINOPHEN 325 MG/1
650 TABLET ORAL
Status: DISCONTINUED | OUTPATIENT
Start: 2019-11-13 | End: 2019-11-16 | Stop reason: HOSPADM

## 2019-11-13 RX ORDER — INSULIN LISPRO 100 [IU]/ML
INJECTION, SOLUTION INTRAVENOUS; SUBCUTANEOUS
Status: DISCONTINUED | OUTPATIENT
Start: 2019-11-13 | End: 2019-11-16 | Stop reason: HOSPADM

## 2019-11-13 RX ORDER — HEPARIN SODIUM 5000 [USP'U]/ML
5000 INJECTION, SOLUTION INTRAVENOUS; SUBCUTANEOUS EVERY 8 HOURS
Status: DISCONTINUED | OUTPATIENT
Start: 2019-11-13 | End: 2019-11-16 | Stop reason: HOSPADM

## 2019-11-13 RX ORDER — ATORVASTATIN CALCIUM 40 MG/1
40 TABLET, FILM COATED ORAL DAILY
Status: DISCONTINUED | OUTPATIENT
Start: 2019-11-14 | End: 2019-11-16 | Stop reason: HOSPADM

## 2019-11-13 RX ORDER — EZETIMIBE 10 MG/1
10 TABLET ORAL DAILY
Status: DISCONTINUED | OUTPATIENT
Start: 2019-11-14 | End: 2019-11-16 | Stop reason: HOSPADM

## 2019-11-13 RX ADMIN — SODIUM CHLORIDE 1000 ML: 900 INJECTION, SOLUTION INTRAVENOUS at 20:00

## 2019-11-13 RX ADMIN — Medication 10 ML: at 23:10

## 2019-11-13 RX ADMIN — FERROUS SULFATE TAB 325 MG (65 MG ELEMENTAL FE) 325 MG: 325 (65 FE) TAB at 23:10

## 2019-11-13 RX ADMIN — SODIUM CHLORIDE 100 ML/HR: 900 INJECTION, SOLUTION INTRAVENOUS at 22:21

## 2019-11-13 RX ADMIN — POTASSIUM CHLORIDE 40 MEQ: 20 TABLET, EXTENDED RELEASE ORAL at 22:21

## 2019-11-13 RX ADMIN — ASPIRIN AND DIPYRIDAMOLE 1 CAPSULE: 25; 200 CAPSULE, EXTENDED RELEASE ORAL at 23:10

## 2019-11-13 RX ADMIN — HEPARIN SODIUM 5000 UNITS: 5000 INJECTION INTRAVENOUS; SUBCUTANEOUS at 22:21

## 2019-11-13 NOTE — ED PROVIDER NOTES
EMERGENCY DEPARTMENT HISTORY AND PHYSICAL EXAM      Date: 11/13/2019  Patient Name: Herb Holley    History of Presenting Illness     Chief Complaint   Patient presents with    Altered mental status     patient states she feels confused, states on and off for 2 months since switching doctors and stopped taking procrit injections       History Provided By: Patient and Patient's     HPI: Herb Holley, 69 y.o. female with PMHx significant for CAD, diabetes, hypertension, stroke, CKD, who presents with a chief complaint of feeling generally unwell and feeling \"confused. \"  Patient states that she feels like she has to think things through more when she states them.  reports that she was previously seeing Dr. Darrell Hanson from nephrology for chronic kidney disease and was getting Procrit injections for hemoglobin less than 11. After he retired, she began seeing Dr. Raymond Keith and has not been getting Procrit as the protocol calls for it being less than 10 prior to injection. Also reports that they recently saw Dr. Raymond Keith and the patient was complaining of leg swelling so started on metolazone. After starting metolazone she has had some weight loss and is felt more weak. He reports that got a letter in the mail that was telling him to stop metolazone and follow-up, however this came to wait for them to actually follow-up. Patient denies any chest pain, shortness of breath, abdominal pain, nausea, vomiting, urinary symptoms. She did recently have eye surgery and has some eye swelling ongoing as a result. PCP: Roberta Renee MD    There are no other complaints, changes, or physical findings at this time.     Current Facility-Administered Medications   Medication Dose Route Frequency Provider Last Rate Last Dose    acetaminophen (TYLENOL) tablet 650 mg  650 mg Oral Q6H PRN Amandeep Boyer MD        aspirin-dipyridamole (AGGRENOX)  mg per capsule 1 Cap  1 Cap Oral BID Cherelle Malloy MD   1 Cap at 11/13/19 2310    [START ON 11/14/2019] atorvastatin (LIPITOR) tablet 40 mg  40 mg Oral DAILY MD Henrique Clarke [START ON 11/14/2019] cyanocobalamin (VITAMIN B12) tablet 1,000 mcg  1,000 mcg Oral DAILY MD Henrique Clarke ON 11/14/2019] DULoxetine (CYMBALTA) capsule 60 mg  60 mg Oral DAILY MD Henrique Clarke ON 11/14/2019] ezetimibe (ZETIA) tablet 10 mg  10 mg Oral DAILY Bella Barrios MD        ferrous sulfate tablet 325 mg  325 mg Oral BID Bella Barrios MD   325 mg at 11/13/19 2310    [START ON 80/75/6002] multivit-folic acid-herbal 297 (WELLESSE PLUS) oral liquid 30 mL  30 mL Oral DAILY Bella Barrios MD        sodium chloride (NS) flush 5-40 mL  5-40 mL IntraVENous Q8H Bella Barrios MD   10 mL at 11/13/19 2310    sodium chloride (NS) flush 5-40 mL  5-40 mL IntraVENous PRN Bella Barrios MD        heparin (porcine) injection 5,000 Units  5,000 Units SubCUTAneous Q8H Bella Barrios MD   5,000 Units at 11/13/19 2221    0.9% sodium chloride infusion  100 mL/hr IntraVENous CONTINUOUS Bella Barrios  mL/hr at 11/13/19 2221 100 mL/hr at 11/13/19 2221    insulin lispro (HUMALOG) injection   SubCUTAneous AC&HS Bella Barrios MD   Stopped at 11/13/19 2200    glucose chewable tablet 16 g  4 Tab Oral PRN Bella Barrios MD        dextrose (D50W) injection syrg 12.5-25 g  12.5-25 g IntraVENous PRN Bella Barrios MD        glucagon (GLUCAGEN) injection 1 mg  1 mg IntraMUSCular PRN Bella Barrios MD         Past History     Past Medical History:  Past Medical History:   Diagnosis Date    Arthritis     CAD (coronary artery disease)     Diabetes ([address]     Hypertension     Stroke [facility])      Past Surgical History:  Past Surgical History:   Procedure Laterality Date    COLONOSCOPY N/A 2/16/2018    COLONOSCOPY performed by Luan Milian MD at [facility] ENDOSCOPY    HX GYN      HX ORTHOPAEDIC      right knee replacement     Family History:  History reviewed.  No pertinent family history. Social History:  Social History     Tobacco Use    Smoking status: Never Smoker    Smokeless tobacco: Never Used   Substance Use Topics    Alcohol use: Yes     Comment: rarely     Drug use: No     Allergies:  No Known Allergies  Review of Systems   Review of Systems   Constitutional: Positive for fatigue. Negative for chills and fever. HENT: Negative for congestion, rhinorrhea and sore throat. Respiratory: Negative for cough and shortness of breath. Cardiovascular: Negative for chest pain. Gastrointestinal: Negative for abdominal pain, nausea and vomiting. Genitourinary: Negative for dysuria and urgency. Skin: Negative for rash. Neurological: Negative for dizziness, light-headedness and headaches. Psychiatric/Behavioral: Positive for confusion. All other systems reviewed and are negative. Physical Exam   Physical Exam   Constitutional: She is oriented to person, place, and time. She appears well-developed and well-nourished. No distress. HENT:   Head: Normocephalic and atraumatic. Eyes: Pupils are equal, round, and reactive to light. Conjunctivae and EOM are normal.   Periorbital swelling   Neck: Normal range of motion. Cardiovascular: Normal rate, regular rhythm and intact distal pulses. Pulmonary/Chest: Effort normal and breath sounds normal. No stridor. No respiratory distress. Abdominal: Soft. She exhibits no distension. There is no tenderness. Musculoskeletal: Normal range of motion. She exhibits edema (1+ b/l LE). Neurological: She is alert and oriented to person, place, and time. No cranial nerve deficit. Skin: Skin is warm and dry. Psychiatric: She has a normal mood and affect. Nursing note and vitals reviewed.     Diagnostic Study Results   Labs -     Recent Results (from the past 12 hour(s))   CBC WITH AUTOMATED DIFF    Collection Time: 11/13/19  5:59 PM   Result Value Ref Range    WBC 7.7 3.6 - 11.0 K/uL    RBC 3.57 (L) 3.80 - 5.20 M/uL    HGB 10.0 (L) 11.5 - 16.0 g/dL    HCT 31.1 (L) 35.0 - 47.0 %    MCV 87.1 80.0 - 99.0 FL    MCH 28.0 26.0 - 34.0 PG    MCHC 32.2 30.0 - 36.5 g/dL    RDW 16.8 (H) 11.5 - 14.5 %    PLATELET 765 521 - 540 K/uL    NRBC 0.0 0  WBC    ABSOLUTE NRBC 0.00 0.00 - 0.01 K/uL    NEUTROPHILS 72 32 - 75 %    LYMPHOCYTES 14 12 - 49 %    MONOCYTES 12 5 - 13 %    EOSINOPHILS 1 0 - 7 %    BASOPHILS 0 0 - 1 %    IMMATURE GRANULOCYTES 0 0.0 - 0.5 %    ABS. NEUTROPHILS 5.6 1.8 - 8.0 K/UL    ABS. LYMPHOCYTES 1.1 0.8 - 3.5 K/UL    ABS. MONOCYTES 0.9 0.0 - 1.0 K/UL    ABS. EOSINOPHILS 0.1 0.0 - 0.4 K/UL    ABS. BASOPHILS 0.0 0.0 - 0.1 K/UL    ABS. IMM. GRANS. 0.0 0.00 - 0.04 K/UL    DF AUTOMATED     METABOLIC PANEL, COMPREHENSIVE    Collection Time: 11/13/19  6:54 PM   Result Value Ref Range    Sodium 140 136 - 145 mmol/L    Potassium 3.0 (L) 3.5 - 5.1 mmol/L    Chloride 98 97 - 108 mmol/L    CO2 32 21 - 32 mmol/L    Anion gap 10 5 - 15 mmol/L    Glucose 166 (H) 65 - 100 mg/dL     (H) 6 - 20 MG/DL    Creatinine 5.58 (H) 0.55 - 1.02 MG/DL    BUN/Creatinine ratio 25 (H) 12 - 20      GFR est AA 9 (L) >60 ml/min/1.73m2    GFR est non-AA 7 (L) >60 ml/min/1.73m2    Calcium 9.0 8.5 - 10.1 MG/DL    Bilirubin, total 0.4 0.2 - 1.0 MG/DL    ALT (SGPT) 38 12 - 78 U/L    AST (SGOT) 32 15 - 37 U/L    Alk.  phosphatase 110 45 - 117 U/L    Protein, total 6.9 6.4 - 8.2 g/dL    Albumin 3.2 (L) 3.5 - 5.0 g/dL    Globulin 3.7 2.0 - 4.0 g/dL    A-G Ratio 0.9 (L) 1.1 - 2.2     TSH 3RD GENERATION    Collection Time: 11/13/19  6:55 PM   Result Value Ref Range    TSH 1.36 0.36 - 3.74 uIU/mL   URINALYSIS W/ REFLEX CULTURE    Collection Time: 11/13/19  7:37 PM   Result Value Ref Range    Color YELLOW/STRAW      Appearance CLEAR CLEAR      Specific gravity 1.010 1.003 - 1.030      pH (UA) 7.0 5.0 - 8.0      Protein TRACE (A) NEG mg/dL    Glucose NEGATIVE  NEG mg/dL    Ketone NEGATIVE  NEG mg/dL    Bilirubin NEGATIVE  NEG      Blood NEGATIVE  NEG Urobilinogen 0.2 0.2 - 1.0 EU/dL    Nitrites NEGATIVE  NEG      Leukocyte Esterase MODERATE (A) NEG      WBC 20-50 0 - 4 /hpf    RBC 0-5 0 - 5 /hpf    Epithelial cells FEW FEW /lpf    Bacteria 1+ (A) NEG /hpf    UA:UC IF INDICATED URINE CULTURE ORDERED (A) CNI      Hyaline cast 0-2 0 - 5 /lpf   GLUCOSE, POC    Collection Time: 11/13/19 11:06 PM   Result Value Ref Range    Glucose (POC) 109 (H) 65 - 100 mg/dL    Performed by Laurie Carreon        Radiologic Studies -   No orders to display     No results found. Medical Decision Making   I am the first provider for this patient. I reviewed the vital signs, available nursing notes, past medical history, past surgical history, family history and social history. Vital Signs-Reviewed the patient's vital signs. Patient Vitals for the past 12 hrs:   Temp Pulse Resp BP SpO2   11/13/19 2317 98.6 °F (37 °C) 73 18 135/71 95 %   11/13/19 2215 98 °F (36.7 °C) 76 16 143/54 99 %   11/13/19 2200 -- -- -- 144/65 98 %   11/13/19 2115 -- -- -- 131/49 98 %   11/13/19 2100 -- -- -- 136/87 100 %   11/13/19 2045 -- -- -- 133/56 98 %   11/13/19 2030 -- -- -- 116/54 99 %   11/13/19 2015 -- -- -- 120/44 98 %   11/13/19 2000 -- -- -- 133/43 97 %   11/13/19 1959 97.9 °F (36.6 °C) 74 18 133/43 98 %   11/13/19 1945 -- -- -- 110/44 99 %   11/13/19 1930 -- -- -- (!) 119/92 100 %   11/13/19 1900 -- -- -- 127/57 99 %   11/13/19 1830 -- -- -- 123/54 98 %   11/13/19 1800 -- -- -- 133/57 98 %   11/13/19 1730 -- -- -- 130/57 99 %   11/13/19 1700 -- -- -- 153/64 99 %   11/13/19 1630 -- -- -- 131/55 99 %   11/13/19 1556 97.5 °F (36.4 °C) 82 18 146/71 99 %       Pulse Oximetry Analysis - 95% on RA. Records Reviewed: Nursing Notes and Old Medical Records    Provider Notes (Medical Decision Making):   Ddx: worsening renal failure with uremia, UTI, anemia, electrolyte imbalance, dehydration    Pt is a&ox4 on my eval. Plan for basic labs, UA    ED Course:   Initial assessment performed.  The patients presenting problems have been discussed, and they are in agreement with the care plan formulated and outlined with them. I have encouraged them to ask questions as they arise throughout their visit. Labs with Cr of 5.58, discussed with nephrology who recommend admission to the hospital for IV fluids. Spoke with Dr. Corin Branham, hospitalist, will see patient for admission. Discussed UA results. Given no sx, holding off on abx for now    Critical Care:  none    Disposition:    Admission Note:  Patient is being admitted to the hospital by Dr. Corin Branham, Service: Hospitalist.  The results of their tests and reasons for their admission have been discussed with them and available family. They convey agreement and understanding for the need to be admitted and for their admission diagnosis. Diagnosis     Clinical Impression:   1. BASILIA (acute kidney injury) ([facility], [address]        This note will not be viewable in [address] Please note that this dictation was completed with [employer], the computer voice recognition software. Quite often unanticipated grammatical, syntax, homophones, and other interpretive errors are inadvertently transcribed by the computer software. Please disregard these errors.   Please excuse any errors that have escaped final proofreading

## 2019-11-14 ENCOUNTER — APPOINTMENT (OUTPATIENT)
Dept: ULTRASOUND IMAGING | Age: 74
DRG: 683 | End: 2019-11-14
Attending: INTERNAL MEDICINE
Payer: MEDICARE

## 2019-11-14 LAB
ANION GAP SERPL CALC-SCNC: 8 MMOL/L (ref 5–15)
BUN SERPL-MCNC: 131 MG/DL (ref 6–20)
BUN/CREAT SERPL: 26 (ref 12–20)
CALCIUM SERPL-MCNC: 8.9 MG/DL (ref 8.5–10.1)
CHLORIDE SERPL-SCNC: 102 MMOL/L (ref 97–108)
CO2 SERPL-SCNC: 31 MMOL/L (ref 21–32)
CREAT SERPL-MCNC: 5.06 MG/DL (ref 0.55–1.02)
CREAT UR-MCNC: 37 MG/DL
ERYTHROCYTE [DISTWIDTH] IN BLOOD BY AUTOMATED COUNT: 15.9 % (ref 11.5–14.5)
GLUCOSE BLD STRIP.AUTO-MCNC: 162 MG/DL (ref 65–100)
GLUCOSE BLD STRIP.AUTO-MCNC: 223 MG/DL (ref 65–100)
GLUCOSE BLD STRIP.AUTO-MCNC: 232 MG/DL (ref 65–100)
GLUCOSE BLD STRIP.AUTO-MCNC: 334 MG/DL (ref 65–100)
GLUCOSE SERPL-MCNC: 173 MG/DL (ref 65–100)
HCT VFR BLD AUTO: 30.5 % (ref 35–47)
HGB BLD-MCNC: 10.2 G/DL (ref 11.5–16)
MCH RBC QN AUTO: 28.6 PG (ref 26–34)
MCHC RBC AUTO-ENTMCNC: 33.4 G/DL (ref 30–36.5)
MCV RBC AUTO: 85.4 FL (ref 80–99)
NRBC # BLD: 0 K/UL (ref 0–0.01)
NRBC BLD-RTO: 0 PER 100 WBC
PLATELET # BLD AUTO: 151 K/UL (ref 150–400)
POTASSIUM SERPL-SCNC: 3.5 MMOL/L (ref 3.5–5.1)
RBC # BLD AUTO: 3.57 M/UL (ref 3.8–5.2)
SERVICE CMNT-IMP: ABNORMAL
SODIUM SERPL-SCNC: 141 MMOL/L (ref 136–145)
SODIUM UR-SCNC: 70 MMOL/L
WBC # BLD AUTO: 7.8 K/UL (ref 3.6–11)

## 2019-11-14 PROCEDURE — 82962 GLUCOSE BLOOD TEST: CPT

## 2019-11-14 PROCEDURE — 74011250637 HC RX REV CODE- 250/637: Performed by: INTERNAL MEDICINE

## 2019-11-14 PROCEDURE — 74011636637 HC RX REV CODE- 636/637: Performed by: INTERNAL MEDICINE

## 2019-11-14 PROCEDURE — 85027 COMPLETE CBC AUTOMATED: CPT

## 2019-11-14 PROCEDURE — 82570 ASSAY OF URINE CREATININE: CPT

## 2019-11-14 PROCEDURE — 80048 BASIC METABOLIC PNL TOTAL CA: CPT

## 2019-11-14 PROCEDURE — 74011250636 HC RX REV CODE- 250/636: Performed by: INTERNAL MEDICINE

## 2019-11-14 PROCEDURE — 84300 ASSAY OF URINE SODIUM: CPT

## 2019-11-14 PROCEDURE — 76770 US EXAM ABDO BACK WALL COMP: CPT

## 2019-11-14 PROCEDURE — 65270000029 HC RM PRIVATE

## 2019-11-14 PROCEDURE — 36415 COLL VENOUS BLD VENIPUNCTURE: CPT

## 2019-11-14 RX ORDER — SODIUM CHLORIDE 9 MG/ML
100 INJECTION, SOLUTION INTRAVENOUS CONTINUOUS
Status: DISCONTINUED | OUTPATIENT
Start: 2019-11-14 | End: 2019-11-16 | Stop reason: HOSPADM

## 2019-11-14 RX ORDER — HYDRALAZINE HYDROCHLORIDE 20 MG/ML
20 INJECTION INTRAMUSCULAR; INTRAVENOUS
Status: DISCONTINUED | OUTPATIENT
Start: 2019-11-14 | End: 2019-11-16 | Stop reason: HOSPADM

## 2019-11-14 RX ADMIN — FERROUS SULFATE TAB 325 MG (65 MG ELEMENTAL FE) 325 MG: 325 (65 FE) TAB at 10:14

## 2019-11-14 RX ADMIN — CYANOCOBALAMIN TAB 500 MCG 1000 MCG: 500 TAB at 10:14

## 2019-11-14 RX ADMIN — INSULIN LISPRO 3 UNITS: 100 INJECTION, SOLUTION INTRAVENOUS; SUBCUTANEOUS at 17:46

## 2019-11-14 RX ADMIN — Medication 30 ML: at 10:20

## 2019-11-14 RX ADMIN — SODIUM CHLORIDE 100 ML/HR: 900 INJECTION, SOLUTION INTRAVENOUS at 21:35

## 2019-11-14 RX ADMIN — Medication 10 ML: at 05:01

## 2019-11-14 RX ADMIN — INSULIN LISPRO 4 UNITS: 100 INJECTION, SOLUTION INTRAVENOUS; SUBCUTANEOUS at 21:22

## 2019-11-14 RX ADMIN — HEPARIN SODIUM 5000 UNITS: 5000 INJECTION INTRAVENOUS; SUBCUTANEOUS at 04:54

## 2019-11-14 RX ADMIN — INSULIN LISPRO 2 UNITS: 100 INJECTION, SOLUTION INTRAVENOUS; SUBCUTANEOUS at 08:33

## 2019-11-14 RX ADMIN — HEPARIN SODIUM 5000 UNITS: 5000 INJECTION INTRAVENOUS; SUBCUTANEOUS at 13:01

## 2019-11-14 RX ADMIN — SODIUM CHLORIDE 100 ML/HR: 900 INJECTION, SOLUTION INTRAVENOUS at 15:28

## 2019-11-14 RX ADMIN — INSULIN LISPRO 3 UNITS: 100 INJECTION, SOLUTION INTRAVENOUS; SUBCUTANEOUS at 12:03

## 2019-11-14 RX ADMIN — ASPIRIN AND DIPYRIDAMOLE 1 CAPSULE: 25; 200 CAPSULE, EXTENDED RELEASE ORAL at 10:14

## 2019-11-14 RX ADMIN — DULOXETINE HYDROCHLORIDE 60 MG: 30 CAPSULE, DELAYED RELEASE ORAL at 10:14

## 2019-11-14 RX ADMIN — HEPARIN SODIUM 5000 UNITS: 5000 INJECTION INTRAVENOUS; SUBCUTANEOUS at 20:50

## 2019-11-14 RX ADMIN — ATORVASTATIN CALCIUM 40 MG: 40 TABLET, FILM COATED ORAL at 10:14

## 2019-11-14 RX ADMIN — FERROUS SULFATE TAB 325 MG (65 MG ELEMENTAL FE) 325 MG: 325 (65 FE) TAB at 17:46

## 2019-11-14 RX ADMIN — Medication 10 ML: at 15:29

## 2019-11-14 RX ADMIN — ASPIRIN AND DIPYRIDAMOLE 1 CAPSULE: 25; 200 CAPSULE, EXTENDED RELEASE ORAL at 17:46

## 2019-11-14 RX ADMIN — Medication 10 ML: at 21:23

## 2019-11-14 RX ADMIN — EZETIMIBE 10 MG: 10 TABLET ORAL at 10:14

## 2019-11-14 NOTE — ACP (ADVANCE CARE PLANNING)
Advance Care Planning Note      NAME: Naa England   :  1945   MRN:  [id number]     Date/Time:  2019 9:01 PM    Active Diagnoses:  Hospital Problems  Date Reviewed: 2018          Codes Class Noted POA    Hypokalemia ICD-10-CM: E87.6  ICD-9-CM: 276.8  2019 Unknown        BASILIA (acute kidney injury) [facility]) ICD-10-CM: N17.9  ICD-9-CM: 584.9  2019 Unknown              These active diagnoses are of sufficient risk that focused discussion on advance care planning is indicated in order to allow the patient to thoughtfully consider personal goals of care, and if situations arise that prevent the ability to personally give input, to ensure appropriate representation of their personal desires for different levels and aggressiveness of care. Discussion:   Code status addressed and wants to be a Full Code. Patient wants central line and vasopressors if needed. Patient would also want a feeding tube, if needed, for nutritional support. Patient  would like to assign her  Bernadette Tobias as the surrogate decision maker. Persons present and participating in discussion: Ha Maradiaga MD,       Time Spent:   Total time spent face-to-face in education and discussion:   17  minutes.          Ha Frederick MD   Hospitalist

## 2019-11-14 NOTE — CONSULTS
Mitch Ruffinana Bakerhead  FRITZ:[id number]   UPB:[birth year]                         Pt seen and examined--[id number]  Detailed note to follow. Marya Zayas [facility]  [employer] [facility]At[facility] [address], [facility], [address] [place]  Phone - [phone]         Fax - [phone] [facility] Office  [address] [place] [address]  Phone - [phone]        Fax - [phone]     [email or web address]

## 2019-11-14 NOTE — PROGRESS NOTES
TRANSFER - IN REPORT:    Verbal report received from Catrina(indigo) on Alvis Klinefelter  being received from ED(unit) for routine progression of care      Report consisted of patients Situation, Background, Assessment and   Recommendations(SBAR). Information from the following report(s) SBAR, Kardex, ED Summary, Intake/Output, MAR and Recent Results was reviewed with the receiving nurse. Opportunity for questions and clarification was provided. Assessment completed upon patients arrival to unit and care assumed.

## 2019-11-14 NOTE — PROGRESS NOTES
Bedside and Verbal shift change report given to Elodia Wood RN and Jessica Giraldo RN (oncoming nurse) by Temo Romero RN (offgoing nurse). Report included the following information SBAR, Kardex and MAR.

## 2019-11-14 NOTE — H&P
Hospitalist Admission Note    NAME: Catracho Seth   :  1945   MRN:  [id number]     Date/Time:  2019 8:53 PM    Patient PCP: Archie Cross MD  ______________________________________________________________________  Given the patient's current clinical presentation, I have a high level of concern for decompensation if discharged from the emergency department. Complex decision making was performed, which includes reviewing the patient's available past medical records, laboratory results, and x-ray films. My assessment of this patient's clinical condition and my plan of care is as follows.     Assessment / Plan:    Acute on chronic kidney injury  - History of chronic kidney disease stage III  - Coming with worsening creatinine 5.58 from baseline around 2.5  - Secondary to dehydration from metolazone  - Stop diuretics and antihypertensive meds  - Follow-up with urine electrolytes and calculate fractional exception of urea in the urine  -IV fluids normal saline 100 cc/h  - Follow-up with BMP tomorrow  -Nephrology was consulted from ED, input is appreciated    CKD stage III  -Management as above    History of hypertension  -Her blood pressure on the lower side  -Hold antihypertensive meds    History diabetes mellitus type 2  -Most recent hemoglobin A1c 7% 2019  - On insulin 24 units twice daily and Actos, will hold for now given her A1c at goal  - Correction scale with hypoglycemia protocol    History of CVA  -With mild left-sided residual weakness  - On aspirin-dipyridamole, will continue    Hypokalemia  - Potassium 3 on admission  - Potassium was given in the ER  -Follow-up BMP tomorrow    Asymptomatic bacteriuria  - No indication to treat at this time      Code Status: Full code  Surrogate Decision Maker: Patient's  Shanti Fontaine    DVT Prophylaxis: Heparin   GI Prophylaxis: not indicated    Baseline: Dependent on her , she uses a cane      Subjective:   CHIEF COMPLAINT: Fatigue    HISTORY OF PRESENT ILLNESS:     The patient is 70years old -American female past medical history chronic kidney disease stage III not on hemodialysis,  CVA with residual left-sided weakness, hypertension, diabetes mellitus type 2 presented emergency department due to fatigue for the last few days. She reported that she went to her nephrologist in October because of worsening lower extremity edema and she was put on metolazone which she has been taking every day since that time. Her nephrologist sent her a letter to stop metolazone because her kidney function was getting worse however she never received the letter for yesterday as her  reported. She does not smoke, drink alcohol or use illicit drugs. She denies any urinary incontinence, frequency, urgency, dysuria, fever or chills. In the emergency department creatinine was found to be 5.58 from 2.5 baseline, nephrology was called and they recommended to stop diuretics and start IV fluids. We were asked to admit for work up and evaluation of the above problems. Past Medical History:   Diagnosis Date    Arthritis     CAD (coronary artery disease)     [facility]     Hypertension     [facility]roke [facility])         Past Surgical History:   Procedure Laterality Date    COLONOSCOPY N/A 2/16/2018    COLONOSCOPY performed by Efren Lane MD at [facility] ENDOSCOPY    HX GYN      HX ORTHOPAEDIC      right knee replacement       Social History     Tobacco Use    Smoking status: Never Smoker    Smokeless tobacco: Never Used   Substance Use Topics    Alcohol use: Yes     Comment: rarely         History reviewed. No pertinent family history. No Known Allergies     Prior to Admission medications    Medication Sig Start Date End Date Taking? Authorizing Provider   amLODIPine (NORVASC) 10 mg tablet Take 10 mg by mouth daily.    Yes Other, MD Sai   insulin lispro protamine/insulin lispro (HUMALOG MIX 75-25) 100 unit/mL (75-25) injection 24 Units by SubCUTAneous route two (2) times a day. Yes Sai Hayes MD   FOLIC ACID/MV,FE,OTHER MIN (CENTRUM COMPLETE PO) Take 1 Tab by mouth daily. Yes Sai Hayes MD   ferrous sulfate 325 mg (65 mg iron) tablet Take 325 mg by mouth two (2) times a day. Sai Hayes MD   ezetimibe (ZETIA) 10 mg tablet Take 10 mg by mouth daily. Sai Hayes MD   torsemide (DEMADEX) 100 mg tablet Take 100 mg by mouth daily. Sai Hayes MD   pioglitazone (ACTOS) 15 mg tablet Take 15 mg by mouth daily. Sai Hayes MD   cloNIDine HCl (CATAPRES) 0.1 mg tablet Take 0.1 mg by mouth two (2) times a day. Sai Hayes MD   cyanocobalamin 1,000 mcg tablet Take 1,000 mcg by mouth daily. Sai Hayes MD   aspirin-dipyridamole (AGGRENOX)  mg per SR capsule Take 1 Cap by mouth two (2) times a day. Sai Hayes MD   DULoxetine (CYMBALTA) 60 mg capsule Take 60 mg by mouth daily. Sai Hayes MD   atorvastatin (LIPITOR) 40 mg tablet Take 40 mg by mouth daily. 10/24/14   Sai Hayes MD       REVIEW OF SYSTEMS:     I am not able to complete the review of systems because:    The patient is intubated and sedated    The patient has altered mental status due to his acute medical problems    The patient has baseline aphasia from prior stroke(s)    The patient has baseline dementia and is not reliable historian    The patient is in acute medical distress and unable to provide information           Total of 12 systems reviewed as follows:       POSITIVE= underlined text  Negative = text not underlined  General:  fever, chills, sweats, generalized weakness, weight loss/gain,      loss of appetite   Eyes:    blurred vision, eye pain, loss of vision, double vision  ENT:    rhinorrhea, pharyngitis   Respiratory:   cough, sputum production, SOB, KWON, wheezing, pleuritic pain   Cardiology:   chest pain, palpitations, orthopnea, PND, edema, syncope   Gastrointestinal:  abdominal pain , N/V, diarrhea, dysphagia, constipation, bleeding   Genitourinary:  frequency, urgency, dysuria, hematuria, incontinence   Muskuloskeletal :  arthralgia, myalgia, back pain  Hematology:  easy bruising, nose or gum bleeding, lymphadenopathy   Dermatological: rash, ulceration, pruritis, color change / jaundice  Endocrine:   hot flashes or polydipsia   Neurological:  headache, dizziness, confusion, focal weakness, paresthesia,     Speech difficulties, memory loss, gait difficulty  Psychological: Feelings of anxiety, depression, agitation    Objective:   VITALS:    Visit Vitals  /43 (BP 1 Location: Left arm, BP Patient Position: Sitting)   Pulse 74   Temp 97.9 °F (36.6 °C)   Resp 18   Ht 5' 1\" (1.549 m)   Wt 70.9 kg (156 lb 3.2 oz)   SpO2 98%   BMI 29.51 kg/m²       PHYSICAL EXAM:    General:    Alert, cooperative, no distress, appears stated age. HEENT: Atraumatic, anicteric sclerae, pink conjunctivae     No oral ulcers, mucosa moist, throat clear, dentition fair  Neck:  Supple, symmetrical,  thyroid: non tender  Lungs:   Clear to auscultation bilaterally. No Wheezing or Rhonchi. No rales. Chest wall:  No tenderness  No Accessory muscle use. Heart:   Regular  rhythm,  No  murmur   No edema  Abdomen:   Soft, non-tender. Not distended. Bowel sounds normal  Extremities: No cyanosis. No clubbing,      Skin turgor normal, Capillary refill normal, Radial dial pulse 2+  Skin:     Not pale. Not Jaundiced  No rashes   Psych:  Good insight. Not depressed. Not anxious or agitated. Neurologic: EOMs intact. No facial asymmetry. No aphasia or slurred speech. Symmetrical strength, Sensation grossly intact.  Alert and oriented X 4.     _______________________________________________________________________  Care Plan discussed with:    Comments   Patient x    Family  x     RN x    Care Manager                    Consultant:      _______________________________________________________________________  Expected  Disposition:   Home with Family x   HH/PT/OT/RN    SNF/LTC    SHELBIE    ________________________________________________________________________  TOTAL TIME:  48 Minutes    Critical Care Provided     Minutes non procedure based      Comments     Reviewed previous records   >50% of visit spent in counseling and coordination of care  Discussion with patient and/or family and questions answered       ________________________________________________________________________  Signed: Saulo Luz MD    Procedures: see electronic medical records for all procedures/Xrays and details which were not copied into this note but were reviewed prior to creation of Plan. LAB DATA REVIEWED:    Recent Results (from the past 24 hour(s))   CBC WITH AUTOMATED DIFF    Collection Time: 11/13/19  5:59 PM   Result Value Ref Range    WBC 7.7 3.6 - 11.0 K/uL    RBC 3.57 (L) 3.80 - 5.20 M/uL    HGB 10.0 (L) 11.5 - 16.0 g/dL    HCT 31.1 (L) 35.0 - 47.0 %    MCV 87.1 80.0 - 99.0 FL    MCH 28.0 26.0 - 34.0 PG    MCHC 32.2 30.0 - 36.5 g/dL    RDW 16.8 (H) 11.5 - 14.5 %    PLATELET 610 881 - 309 K/uL    NRBC 0.0 0  WBC    ABSOLUTE NRBC 0.00 0.00 - 0.01 K/uL    NEUTROPHILS 72 32 - 75 %    LYMPHOCYTES 14 12 - 49 %    MONOCYTES 12 5 - 13 %    EOSINOPHILS 1 0 - 7 %    BASOPHILS 0 0 - 1 %    IMMATURE GRANULOCYTES 0 0.0 - 0.5 %    ABS. NEUTROPHILS 5.6 1.8 - 8.0 K/UL    ABS. LYMPHOCYTES 1.1 0.8 - 3.5 K/UL    ABS. MONOCYTES 0.9 0.0 - 1.0 K/UL    ABS. EOSINOPHILS 0.1 0.0 - 0.4 K/UL    ABS. BASOPHILS 0.0 0.0 - 0.1 K/UL    ABS. IMM.  GRANS. 0.0 0.00 - 0.04 K/UL    DF AUTOMATED     METABOLIC PANEL, COMPREHENSIVE    Collection Time: 11/13/19  6:54 PM   Result Value Ref Range    Sodium 140 136 - 145 mmol/L    Potassium 3.0 (L) 3.5 - 5.1 mmol/L    Chloride 98 97 - 108 mmol/L    CO2 32 21 - 32 mmol/L    Anion gap 10 5 - 15 mmol/L    Glucose 166 (H) 65 - 100 mg/dL     (H) 6 - 20 MG/DL    Creatinine 5.58 (H) 0.55 - 1.02 MG/DL    BUN/Creatinine ratio 25 (H) 12 - 20      GFR est AA 9 (L) >60 ml/min/1.73m2    GFR est non-AA 7 (L) >60 ml/min/1.73m2    Calcium 9.0 8.5 - 10.1 MG/DL    Bilirubin, total 0.4 0.2 - 1.0 MG/DL    ALT (SGPT) 38 12 - 78 U/L    AST (SGOT) 32 15 - 37 U/L    Alk.  phosphatase 110 45 - 117 U/L    Protein, total 6.9 6.4 - 8.2 g/dL    Albumin 3.2 (L) 3.5 - 5.0 g/dL    Globulin 3.7 2.0 - 4.0 g/dL    A-G Ratio 0.9 (L) 1.1 - 2.2     TSH 3RD GENERATION    Collection Time: 11/13/19  6:55 PM   Result Value Ref Range    TSH 1.36 0.36 - 3.74 uIU/mL   URINALYSIS W/ REFLEX CULTURE    Collection Time: 11/13/19  7:37 PM   Result Value Ref Range    Color YELLOW/STRAW      Appearance CLEAR CLEAR      Specific gravity 1.010 1.003 - 1.030      pH (UA) 7.0 5.0 - 8.0      Protein TRACE (A) NEG mg/dL    Glucose NEGATIVE  NEG mg/dL    Ketone NEGATIVE  NEG mg/dL    Bilirubin NEGATIVE  NEG      Blood NEGATIVE  NEG      Urobilinogen 0.2 0.2 - 1.0 EU/dL    Nitrites NEGATIVE  NEG      Leukocyte Esterase MODERATE (A) NEG      WBC 20-50 0 - 4 /hpf    RBC 0-5 0 - 5 /hpf    Epithelial cells FEW FEW /lpf    Bacteria 1+ (A) NEG /hpf    UA:UC IF INDICATED URINE CULTURE ORDERED (A) CNI      Hyaline cast 0-2 0 - 5 /lpf

## 2019-11-14 NOTE — PROGRESS NOTES
Pt with BASILIA on CKD 4. Might be over diuresis   Hold Metolazone  ?  Edema due to Actos:? Can she come off  No HD needed  Gentle K repletion  Hold Torsemide as well  We will follow

## 2019-11-14 NOTE — CONSULTS
[facility]    Name:  Tracey Menchaca  MR#:  [id number]  :  1945  ACCOUNT #:  [de-identified]  DATE OF SERVICE:  2019    REFERRING PHYSICIAN:  Stephanie Graves MD    REASON FOR CONSULTATION:  Acute kidney injury. HISTORY OF PRESENT ILLNESS:  The patient is a 72-year-old -American female with past medical history of CKD, hypertension, and anemia who presented to ER with complaint of weakness. She is found to have an acute renal failure with creatinine of 5.58, BUN of 141, and potassium of 3.0. She has been followed by Dr. Cher Marshall as an outpatient. In the past, she was seen by Dr. Marion James. The patient has been on metolazone and torsemide. She denies taking any NSAIDs recently. No history of vomiting. No diarrhea. She denies any antibiotic intake. No complaint of dysuria or hematuria. She denies any recent IV contrast exposure. Her urine in ER is positive for leukocyte esterase. She denies any shortness of breath, abdominal pain, or flank pain. PAST MEDICAL HISTORY:  1. Hypertension. 2.  CKD, stage IV, followed by Dr. Cher Marshall. 3.  Diabetic nephropathy. 4.  History of CVA. 5.  History of coronary artery disease. 6.  History of DJD. 7.  Hyperlipidemia. 8.  Anemia. HOME MEDICATIONS:  List reviewed. PAST SURGICAL HISTORY:  Right knee replacement. SOCIAL HISTORY:  Lives with . Denies smoking, alcohol, or drug abuse. FAMILY HISTORY:  Negative for any end-stage renal disease. REVIEW OF SYSTEMS:  As per HPI. Totally, 11-system reviewed, they are essentially negative. PHYSICAL EXAMINATION:  GENERAL:  The patient is lying in the bed, comfortable, not in apparent distress. Alert, awake and oriented x3. VITAL SIGNS:  Temperature 98.6, pulse 82, blood pressure 144/66, respiratory rate 19. HEENT:  Normal nose. Normal hearing. Oral mucosa is dry. NECK:  Supple. No palpable neck mass.   LUNGS: Clear to auscultation bilaterally. No wheezing or crackles. CARDIAC:  Normal S1, S2.  Regular rate and rhythm. ABDOMEN:  Soft, nontender. Bowel sounds present. EXTREMITIES:  +1 lower extremity edema present. NEUROLOGIC:  Nonfocal.  Normal speech. LABORATORY AND DIAGNOSTIC DATA:  Labs on admission:  Sodium 140, potassium 3.0, chloride 98, , creatinine 5.58, calcium 9.0, albumin 3.2.  UA positive for protein, positive for leukocyte esterase, negative for nitrites. Hemoglobin 10.2. Old labs from 09/2018, creatinine 3.5. IMPRESSION:  1. Acute kidney injury likely due to dehydration, hold the torsemide and metolazone. Continue intravenous fluids, normal saline. Check renal ultrasound. Check urine electrolytes. Check BNP in the morning. 2.  No indication for renal replacement therapy at present. 3.  Avoid ACE inhibitors or ARB. 4.  Chronic kidney disease, stage IV-V, followed by Dr. Elizabeth Jacobo. 5.  Chronic kidney disease, likely due to diabetic nephropathy. We will try to get most recent creatinine level. 6.  Creatinine was 3.4 in 07/2019 and 3.5 in 09/2018.  7.  Anemia of chronic kidney disease. 8.  History of hypertension. 9.  Urinary traction infection. Follow urine cultures. She is asymptomatic. She is not started on any antibiotic. Thanks for consultation. We will follow the patient with you.         Christopher Elena MD      [id number]  D:  11/14/2019 12:15  T:  11/14/2019 15:22  JOB #:  [id number]

## 2019-11-14 NOTE — PROGRESS NOTES
Primary Nurse Joanne Penaloza and Clarissa Landaverde, BALDO performed a dual skin assessment on this patient No impairment noted  Chance score is 18

## 2019-11-14 NOTE — PROGRESS NOTES
Problem: Falls - Risk of  Goal: *Absence of Falls  Description  Document Velia Ruts Fall Risk and appropriate interventions in the flowsheet. Outcome: Progressing Towards Goal  Note:   Fall Risk Interventions:                                Problem: Patient Education: Go to Patient Education Activity  Goal: Patient/Family Education  Outcome: Progressing Towards Goal     Problem: Pressure Injury - Risk of  Goal: *Prevention of pressure injury  Description  Document Chance Scale and appropriate interventions in the flowsheet.   Outcome: Progressing Towards Goal  Note:   Pressure Injury Interventions:  Sensory Interventions: Assess changes in LOC    Moisture Interventions: Absorbent underpads, Maintain skin hydration (lotion/cream), Minimize layers    Activity Interventions: Increase time out of bed, Pressure redistribution bed/mattress(bed type), PT/OT evaluation    Mobility Interventions: Pressure redistribution bed/mattress (bed type), PT/OT evaluation    Nutrition Interventions: Document food/fluid/supplement intake                     Problem: Patient Education: Go to Patient Education Activity  Goal: Patient/Family Education  Outcome: Progressing Towards Goal

## 2019-11-14 NOTE — PROGRESS NOTES
Hospitalist Progress Note           2019  2:01 PM                       Patient:  Hilary Vanessa  PCP:  Ghislaine Andres MD  Date of admission:  2019     71 yo female CAD, DM, HTN, CVA, CKD 3, admitted for abnormal kidney function, decreased appetite and weakness.      Assessment & Plan  BASILIA on CKD 4  - possibly related to Diuretics/Metalozone and Poor po intake  - on IVF  - Cr starting to improve slowly  - renal consulted and following    Hx of CVA  - c/w Aggrenox and Lipitor    HLD  - c/w Zetia    DM   - c/w sliding scale   - monitor      VTE prophylaxis: Hep SQ  Follow-up labs/studies: NONE  Discussed plan of care with Patient/Family and Nurse   Disposition:  Anticipate discharge to HOME      Subjective  Pt seen and examined  Feels better today  Still feels weak and tired  Review of systems otherwise as above     Physical examination  Visit Vitals  /66   Pulse 82   Temp 98.6 °F (37 °C)   Resp 19   Ht 5' 1\" (1.549 m)   Wt 70.9 kg (156 lb 3.2 oz)   SpO2 98%   BMI 29.51 kg/m²      Temp (24hrs), Av.1 °F (36.7 °C), Min:97.5 °F (36.4 °C), Max:98.6 °F (37 °C)         O2 Device: Room air  Visit Vitals  /66   Pulse 82   Temp 98.6 °F (37 °C)   Resp 19   Ht 5' 1\" (1.549 m)   Wt 70.9 kg (156 lb 3.2 oz)   SpO2 98%   BMI 29.51 kg/m²    O2 Device: Room air  Visit Vitals  /66   Pulse 82   Temp 98.6 °F (37 °C)   Resp 19   Ht 5' 1\" (1.549 m)   Wt 70.9 kg (156 lb 3.2 oz)   SpO2 98%   BMI 29.51 kg/m²         Intake/Output Summary (Last 24 hours) at 2019 1401  Last data filed at 2019 1019  Gross per 24 hour   Intake 1200 ml   Output 701 ml   Net 499 ml     Last shift:     07 -  190  In: 200 [P.O.:200]  Out: 701 [Urine:700]  Last 3 shifts:    1901 -  0700  In: 1000 [I.V.:1000]  Out: -     General: Alert, cooperative, no acute distress   Head:   No obvious abnormalities, atraumatic   Eyes:   Conjunctivae clear   Oropharynx:  Oral mucosa normal   Neck:   No JVD   Lungs:   Clear to auscultation bilaterally    Heart:   Regular rhythm, no murmur   Abdomen:    Soft, non-tender , Bowel sounds normal       Extremities:  No edema or DVT signs   Pulses:  Symmetric all extremities   Skin:  Warm and dry    No rashes or lesions   Neurologic:  Oriented x3   No focal deficits   Urinary catheter:  deferred     Data review      Recent Labs     11/14/19 0159 11/13/19  1759   WBC 7.8 7.7   HGB 10.2* 10.0*   HCT 30.5* 31.1*    151     Recent Labs     11/14/19 0159 11/13/19  1854    140   K 3.5 3.0*    98   CO2 31 32   * 166*   * 141*   CREA 5.06* 5.58*   CA 8.9 9.0   ALB  --  3.2*   TBILI  --  0.4   SGOT  --  32   ALT  --  38     Current Facility-Administered Medications   Medication Dose Route Frequency    0.9% sodium chloride infusion  100 mL/hr IntraVENous CONTINUOUS    hydrALAZINE (APRESOLINE) 20 mg/mL injection 20 mg  20 mg IntraVENous Q6H PRN    acetaminophen (TYLENOL) tablet 650 mg  650 mg Oral Q6H PRN    aspirin-dipyridamole (AGGRENOX)  mg per capsule 1 Cap  1 Cap Oral BID    atorvastatin (LIPITOR) tablet 40 mg  40 mg Oral DAILY    cyanocobalamin (VITAMIN B12) tablet 1,000 mcg  1,000 mcg Oral DAILY    DULoxetine (CYMBALTA) capsule 60 mg  60 mg Oral DAILY    ezetimibe (ZETIA) tablet 10 mg  10 mg Oral DAILY    ferrous sulfate tablet 325 mg  325 mg Oral BID    multivit-folic acid-herbal 058 (WELLESSE PLUS) oral liquid 30 mL  30 mL Oral DAILY    sodium chloride (NS) flush 5-40 mL  5-40 mL IntraVENous Q8H    sodium chloride (NS) flush 5-40 mL  5-40 mL IntraVENous PRN    heparin (porcine) injection 5,000 Units  5,000 Units SubCUTAneous Q8H    insulin lispro (HUMALOG) injection   SubCUTAneous AC&HS    glucose chewable tablet 16 g  4 Tab Oral PRN    dextrose (D50W) injection syrg 12.5-25 g  12.5-25 g IntraVENous PRN    glucagon (GLUCAGEN) injection 1 mg  1 mg IntraMUSCular PRN     Total time spent managing care of the patient: 30 minutes.        Jaime Adamson MD   --Hospitalist, Internal Medicine

## 2019-11-15 LAB
ALBUMIN SERPL-MCNC: 3.1 G/DL (ref 3.5–5)
ANION GAP SERPL CALC-SCNC: 10 MMOL/L (ref 5–15)
BACTERIA SPEC CULT: NORMAL
BUN SERPL-MCNC: 102 MG/DL (ref 6–20)
BUN/CREAT SERPL: 25 (ref 12–20)
CALCIUM SERPL-MCNC: 8.7 MG/DL (ref 8.5–10.1)
CC UR VC: NORMAL
CHLORIDE SERPL-SCNC: 108 MMOL/L (ref 97–108)
CO2 SERPL-SCNC: 26 MMOL/L (ref 21–32)
CREAT SERPL-MCNC: 4.16 MG/DL (ref 0.55–1.02)
CREAT UR-MCNC: 35.2 MG/DL
ERYTHROCYTE [DISTWIDTH] IN BLOOD BY AUTOMATED COUNT: 16.2 % (ref 11.5–14.5)
GLUCOSE BLD STRIP.AUTO-MCNC: 173 MG/DL (ref 65–100)
GLUCOSE BLD STRIP.AUTO-MCNC: 211 MG/DL (ref 65–100)
GLUCOSE BLD STRIP.AUTO-MCNC: 328 MG/DL (ref 65–100)
GLUCOSE BLD STRIP.AUTO-MCNC: 391 MG/DL (ref 65–100)
GLUCOSE SERPL-MCNC: 170 MG/DL (ref 65–100)
HCT VFR BLD AUTO: 29.7 % (ref 35–47)
HGB BLD-MCNC: 9.8 G/DL (ref 11.5–16)
MCH RBC QN AUTO: 28.2 PG (ref 26–34)
MCHC RBC AUTO-ENTMCNC: 33 G/DL (ref 30–36.5)
MCV RBC AUTO: 85.6 FL (ref 80–99)
NRBC # BLD: 0 K/UL (ref 0–0.01)
NRBC BLD-RTO: 0 PER 100 WBC
PHOSPHATE SERPL-MCNC: 3 MG/DL (ref 2.6–4.7)
PLATELET # BLD AUTO: 142 K/UL (ref 150–400)
POTASSIUM SERPL-SCNC: 3.1 MMOL/L (ref 3.5–5.1)
RBC # BLD AUTO: 3.47 M/UL (ref 3.8–5.2)
SERVICE CMNT-IMP: ABNORMAL
SERVICE CMNT-IMP: NORMAL
SODIUM SERPL-SCNC: 144 MMOL/L (ref 136–145)
SODIUM UR-SCNC: 73 MMOL/L
UUN UR-MCNC: 454 MG/DL
WBC # BLD AUTO: 7.5 K/UL (ref 3.6–11)

## 2019-11-15 PROCEDURE — 74011250636 HC RX REV CODE- 250/636: Performed by: INTERNAL MEDICINE

## 2019-11-15 PROCEDURE — 74011250637 HC RX REV CODE- 250/637: Performed by: HOSPITALIST

## 2019-11-15 PROCEDURE — 84540 ASSAY OF URINE/UREA-N: CPT

## 2019-11-15 PROCEDURE — 74011636637 HC RX REV CODE- 636/637: Performed by: INTERNAL MEDICINE

## 2019-11-15 PROCEDURE — 74011250637 HC RX REV CODE- 250/637: Performed by: INTERNAL MEDICINE

## 2019-11-15 PROCEDURE — 84300 ASSAY OF URINE SODIUM: CPT

## 2019-11-15 PROCEDURE — 82570 ASSAY OF URINE CREATININE: CPT

## 2019-11-15 PROCEDURE — 36415 COLL VENOUS BLD VENIPUNCTURE: CPT

## 2019-11-15 PROCEDURE — 65270000029 HC RM PRIVATE

## 2019-11-15 PROCEDURE — 97116 GAIT TRAINING THERAPY: CPT | Performed by: PHYSICAL THERAPIST

## 2019-11-15 PROCEDURE — 74011636637 HC RX REV CODE- 636/637: Performed by: HOSPITALIST

## 2019-11-15 PROCEDURE — 80069 RENAL FUNCTION PANEL: CPT

## 2019-11-15 PROCEDURE — 85027 COMPLETE CBC AUTOMATED: CPT

## 2019-11-15 PROCEDURE — 97530 THERAPEUTIC ACTIVITIES: CPT | Performed by: PHYSICAL THERAPIST

## 2019-11-15 PROCEDURE — 82962 GLUCOSE BLOOD TEST: CPT

## 2019-11-15 PROCEDURE — 97161 PT EVAL LOW COMPLEX 20 MIN: CPT | Performed by: PHYSICAL THERAPIST

## 2019-11-15 RX ORDER — INSULIN LISPRO 100 [IU]/ML
10 INJECTION, SOLUTION INTRAVENOUS; SUBCUTANEOUS ONCE
Status: COMPLETED | OUTPATIENT
Start: 2019-11-15 | End: 2019-11-15

## 2019-11-15 RX ORDER — POTASSIUM CHLORIDE 750 MG/1
40 TABLET, FILM COATED, EXTENDED RELEASE ORAL
Status: COMPLETED | OUTPATIENT
Start: 2019-11-15 | End: 2019-11-15

## 2019-11-15 RX ORDER — AMLODIPINE BESYLATE 5 MG/1
10 TABLET ORAL DAILY
Status: DISCONTINUED | OUTPATIENT
Start: 2019-11-15 | End: 2019-11-16 | Stop reason: HOSPADM

## 2019-11-15 RX ADMIN — Medication 10 ML: at 14:00

## 2019-11-15 RX ADMIN — INSULIN LISPRO 2 UNITS: 100 INJECTION, SOLUTION INTRAVENOUS; SUBCUTANEOUS at 07:30

## 2019-11-15 RX ADMIN — INSULIN LISPRO 7 UNITS: 100 INJECTION, SOLUTION INTRAVENOUS; SUBCUTANEOUS at 18:19

## 2019-11-15 RX ADMIN — INSULIN LISPRO 3 UNITS: 100 INJECTION, SOLUTION INTRAVENOUS; SUBCUTANEOUS at 11:48

## 2019-11-15 RX ADMIN — ASPIRIN AND DIPYRIDAMOLE 1 CAPSULE: 25; 200 CAPSULE, EXTENDED RELEASE ORAL at 18:19

## 2019-11-15 RX ADMIN — ATORVASTATIN CALCIUM 40 MG: 40 TABLET, FILM COATED ORAL at 08:42

## 2019-11-15 RX ADMIN — HEPARIN SODIUM 5000 UNITS: 5000 INJECTION INTRAVENOUS; SUBCUTANEOUS at 21:38

## 2019-11-15 RX ADMIN — ASPIRIN AND DIPYRIDAMOLE 1 CAPSULE: 25; 200 CAPSULE, EXTENDED RELEASE ORAL at 08:41

## 2019-11-15 RX ADMIN — Medication 10 ML: at 05:13

## 2019-11-15 RX ADMIN — EZETIMIBE 10 MG: 10 TABLET ORAL at 08:41

## 2019-11-15 RX ADMIN — Medication 30 ML: at 08:42

## 2019-11-15 RX ADMIN — Medication 10 ML: at 21:37

## 2019-11-15 RX ADMIN — HEPARIN SODIUM 5000 UNITS: 5000 INJECTION INTRAVENOUS; SUBCUTANEOUS at 15:04

## 2019-11-15 RX ADMIN — INSULIN LISPRO 10 UNITS: 100 INJECTION, SOLUTION INTRAVENOUS; SUBCUTANEOUS at 21:37

## 2019-11-15 RX ADMIN — SODIUM CHLORIDE 100 ML/HR: 900 INJECTION, SOLUTION INTRAVENOUS at 08:21

## 2019-11-15 RX ADMIN — DULOXETINE HYDROCHLORIDE 60 MG: 30 CAPSULE, DELAYED RELEASE ORAL at 08:42

## 2019-11-15 RX ADMIN — FERROUS SULFATE TAB 325 MG (65 MG ELEMENTAL FE) 325 MG: 325 (65 FE) TAB at 08:42

## 2019-11-15 RX ADMIN — AMLODIPINE BESYLATE 10 MG: 5 TABLET ORAL at 11:48

## 2019-11-15 RX ADMIN — POTASSIUM CHLORIDE 40 MEQ: 750 TABLET, FILM COATED, EXTENDED RELEASE ORAL at 08:41

## 2019-11-15 RX ADMIN — SODIUM CHLORIDE 100 ML/HR: 900 INJECTION, SOLUTION INTRAVENOUS at 19:18

## 2019-11-15 RX ADMIN — HEPARIN SODIUM 5000 UNITS: 5000 INJECTION INTRAVENOUS; SUBCUTANEOUS at 04:41

## 2019-11-15 RX ADMIN — CYANOCOBALAMIN TAB 500 MCG 1000 MCG: 500 TAB at 08:42

## 2019-11-15 RX ADMIN — FERROUS SULFATE TAB 325 MG (65 MG ELEMENTAL FE) 325 MG: 325 (65 FE) TAB at 18:19

## 2019-11-15 NOTE — PROGRESS NOTES
Problem: Falls - Risk of  Goal: *Absence of Falls  Description  Document Ranjana Granger Fall Risk and appropriate interventions in the flowsheet. Outcome: Progressing Towards Goal  Note:   Fall Risk Interventions:  Mobility Interventions: Communicate number of staff needed for ambulation/transfer, Patient to call before getting OOB, Utilize walker, cane, or other assistive device         Medication Interventions: Patient to call before getting OOB, Teach patient to arise slowly    Elimination Interventions: Call light in reach, Patient to call for help with toileting needs, Stay With Me (per policy)    History of Falls Interventions: Door open when patient unattended    Pt ambulating at baseline with walker and standby assist.     Problem: Patient Education: Go to Patient Education Activity  Goal: Patient/Family Education  Outcome: Progressing Towards Goal     Problem: Pressure Injury - Risk of  Goal: *Prevention of pressure injury  Description  Document Chance Scale and appropriate interventions in the flowsheet. Outcome: Progressing Towards Goal  Note:   Pressure Injury Interventions:  Sensory Interventions: Keep linens dry and wrinkle-free, Minimize linen layers    Moisture Interventions: Absorbent underpads, Maintain skin hydration (lotion/cream), Minimize layers, Offer toileting Q_hr    Activity Interventions: Increase time out of bed    Mobility Interventions: HOB 30 degrees or less, Turn and reposition approx.  every two hours(pillow and wedges)    Nutrition Interventions: Document food/fluid/supplement intake                     Problem: Patient Education: Go to Patient Education Activity  Goal: Patient/Family Education  Outcome: Progressing Towards Goal

## 2019-11-15 NOTE — PROGRESS NOTES
Bedside shift change report given to BALOD Salmeron and Palmer Sacks, RN (oncoming nurse) by Luz Mcneal RN   (offgoing nurse). Report included the following information SBAR, Kardex, MAR and Recent Results.

## 2019-11-15 NOTE — DIABETES MGMT
[facility]    [facility] Progress Note    Recommendations/ Comments: If appropriate, please consider beginning lantus 8 units daily. Current hospital DM medication: humalog correction    Chart reviewed on [address] Patient is a 69 y.o. female with known Type 2 Diabetes on humalog 75/25 24 units ac b/d and actos 15mg daily at home. A1c:   Lab Results   Component Value Date/Time    Hemoglobin A1c 7.0 (H) 09/05/2018 03:39 AM    Hemoglobin A1c 7.3 (H) 11/25/2016 03:42 AM       Recent Glucose Results:   Lab Results   Component Value Date/Time     (H) 11/15/2019 02:52 AM    GLUCPOC 211 (H) 11/15/2019 11:18 AM    GLUCPOC 173 (H) 11/15/2019 07:41 AM    GLUCPOC 334 (H) 11/14/2019 08:39 PM        Lab Results   Component Value Date/Time    Creatinine 4.16 (H) 11/15/2019 02:52 AM     Estimated Creatinine Clearance: 11 mL/min (A) (based on SCr of 4.16 mg/dL (H)). Active Orders   Diet    DIET RENAL Regular        PO intake:   Patient Vitals for the past 72 hrs:   % Diet Eaten   11/14/19 1233 100 %   11/14/19 0800 50 %       Will continue to follow as needed.     Thank you    BERENICE ArizaN, RN, [address]

## 2019-11-15 NOTE — PROGRESS NOTES
Bedside and Verbal shift change report given to Yarelis Rome (oncoming nurse) by Ari Payton RN and Paresh Taylor RN (offgoing nurse). Report included the following information Kardex, MAR and Recent Results.

## 2019-11-15 NOTE — PROGRESS NOTES
Hospitalist Progress Note           11/15/2019  2:01 PM                       Patient:  Maddie De La Torre  PCP:  Conchis Mark MD  Date of admission:  2019     71 yo female CAD, DM, HTN, CVA, CKD 3, admitted for abnormal kidney function, decreased appetite and weakness. Assessment & Plan  BASILIA on CKD 4  - possibly related to Torsemide/Metalozone and Poor po intake  - on IVF  - Improving   - renal consulted and following  - retroperitoneal US: normal findings for CKD patient     Hx of CVA  - c/w Aggrenox and Lipitor    HLD  - c/w Zetia    DM   - c/w sliding scale   - monitor      VTE prophylaxis: Hep SQ  Follow-up labs/studies: NONE  Discussed plan of care with Patient/Family and Nurse   Disposition:  Anticipate discharge to HOME      Subjective  Pt seen and examined  Feels better today   No new overnight issues  Review of systems otherwise as above     Physical examination  Visit Vitals  /69 (BP 1 Location: Left arm, BP Patient Position: At rest)   Pulse 84   Temp 98.3 °F (36.8 °C)   Resp 16   Ht 5' 1\" (1.549 m)   Wt 70.9 kg (156 lb 3.2 oz)   SpO2 98%   BMI 29.51 kg/m²      Temp (24hrs), Av °F (36.7 °C), Min:97.4 °F (36.3 °C), Max:98.7 °F (37.1 °C)         O2 Device: Room air  Visit Vitals  /69 (BP 1 Location: Left arm, BP Patient Position: At rest)   Pulse 84   Temp 98.3 °F (36.8 °C)   Resp 16   Ht 5' 1\" (1.549 m)   Wt 70.9 kg (156 lb 3.2 oz)   SpO2 98%   BMI 29.51 kg/m²    O2 Device: Room air  Visit Vitals  /69 (BP 1 Location: Left arm, BP Patient Position: At rest)   Pulse 84   Temp 98.3 °F (36.8 °C)   Resp 16   Ht 5' 1\" (1.549 m)   Wt 70.9 kg (156 lb 3.2 oz)   SpO2 98%   BMI 29.51 kg/m²       No intake or output data in the 24 hours ending 11/15/19 1139  Last shift:    No intake/output data recorded.   Last 3 shifts:    11/13 1901 - 11/15 0700  In: 1200 [P.O.:200;  I.V.:1000]  Out: 701 [Urine:700]    General:   Alert, cooperative, no acute distress   Head:   No obvious abnormalities, atraumatic   Eyes:   Conjunctivae clear   Oropharynx:  Oral mucosa normal   Neck:   No JVD   Lungs:   Clear to auscultation bilaterally    Heart:   Regular rhythm, no murmur   Abdomen:    Soft, non-tender , Bowel sounds normal       Extremities:  No edema or DVT signs   Pulses:  Symmetric all extremities   Skin:  Warm and dry    No rashes or lesions   Neurologic:  Oriented x3   No focal deficits   Urinary catheter:  deferred     Data review      Recent Labs     11/15/19  0252 11/14/19  0159 11/13/19  1759   WBC 7.5 7.8 7.7   HGB 9.8* 10.2* 10.0*   HCT 29.7* 30.5* 31.1*   * 151 151     Recent Labs     11/15/19  0252 11/14/19  0159 11/13/19  1854    141 140   K 3.1* 3.5 3.0*    102 98   CO2 26 31 32   * 173* 166*   * 131* 141*   CREA 4.16* 5.06* 5.58*   CA 8.7 8.9 9.0   PHOS 3.0  --   --    ALB 3.1*  --  3.2*   TBILI  --   --  0.4   SGOT  --   --  32   ALT  --   --  38     Current Facility-Administered Medications   Medication Dose Route Frequency    epoetin lucina-epbx (RETACRIT) 12,000 Units combo injection  12,000 Units SubCUTAneous Q MON, WED & FRI    amLODIPine (NORVASC) tablet 10 mg  10 mg Oral DAILY    0.9% sodium chloride infusion  100 mL/hr IntraVENous CONTINUOUS    hydrALAZINE (APRESOLINE) 20 mg/mL injection 20 mg  20 mg IntraVENous Q6H PRN    acetaminophen (TYLENOL) tablet 650 mg  650 mg Oral Q6H PRN    aspirin-dipyridamole (AGGRENOX)  mg per capsule 1 Cap  1 Cap Oral BID    atorvastatin (LIPITOR) tablet 40 mg  40 mg Oral DAILY    cyanocobalamin (VITAMIN B12) tablet 1,000 mcg  1,000 mcg Oral DAILY    DULoxetine (CYMBALTA) capsule 60 mg  60 mg Oral DAILY    ezetimibe (ZETIA) tablet 10 mg  10 mg Oral DAILY    ferrous sulfate tablet 325 mg  325 mg Oral BID    multivit-folic acid-herbal 275 (WELLESSE PLUS) oral liquid 30 mL  30 mL Oral DAILY    sodium chloride (NS) flush 5-40 mL  5-40 mL IntraVENous Q8H    sodium chloride (NS) flush 5-40 mL  5-40 mL IntraVENous PRN    heparin (porcine) injection 5,000 Units  5,000 Units SubCUTAneous Q8H    insulin lispro (HUMALOG) injection   SubCUTAneous AC&HS    glucose chewable tablet 16 g  4 Tab Oral PRN    dextrose (D50W) injection syrg 12.5-25 g  12.5-25 g IntraVENous PRN    glucagon (GLUCAGEN) injection 1 mg  1 mg IntraMUSCular PRN     Total time spent managing care of the patient: 30 minutes.        Rachid Malhotra MD   --Hospitalist, Internal Medicine

## 2019-11-15 NOTE — PROGRESS NOTES
Problem: Mobility Impaired (Adult and Pediatric)  Goal: *Acute Goals and Plan of Care (Insert Text)  Description  FUNCTIONAL STATUS PRIOR TO ADMISSION: Patient reports independence with ambulation within home but reports falls; needs assistance with lower body ADLs    HOME SUPPORT PRIOR TO ADMISSION: The patient lived with  who assists with lower body ADLs and provides supervision during bathing. Physical Therapy Goals  Initiated 11/15/2019  1. Patient will move from supine to sit and sit to supine , scoot up and down and roll side to side in bed with modified independence within 7 day(s). 2.  Patient will transfer from bed to chair and chair to bed with supervision/set-up using the least restrictive device within 7 day(s). 3.  Patient will perform sit to stand with supervision/set-up within 7 day(s). 4.  Patient will ambulate with supervision/set-up for 150 feet with the least restrictive device within 7 day(s). 5.  Patient will ascend/descend 4 stairs with 1 handrail(s) with supervision/set-up within 7 day(s). Outcome: Not Met   PHYSICAL THERAPY EVALUATION  Patient: Tessy Rodrigues (75 y.o. female)  Date: 11/15/2019  Primary Diagnosis: BASILIA (acute kidney injury) ([facility] [address] [N17.9]  Hypokalemia [E87.6]        Precautions: falls         ASSESSMENT  Based on the objective data described below, the patient presents with h/o R CVA with mild L sided residual impairment. Patient demonstrates generalized weakness and impaired functional mobility, balance and endurance. Patient able to ambulate 100 feet today but gait is shuffled and mildly unsteady. Patient reports ambulation without device at home but admits to falls. Patient educated on use of RW for ambulation to improve gait stability and decrease risk of falls. Patient lives with  who is caregiver. At this time recommend SNF vs HHPT following discharge.  Patient would likely benefit from SNF for further rehab if agreeable to improve overall mobility and endurance prior to returning to home. Current Level of Function Impacting Discharge (mobility/balance): CGA to min A and additional time    Functional Outcome Measure: The patient scored 5/20 on the Elderly Mobility scale outcome measure which is indicative of impaired functional independence with increased risk of falls. Patient will benefit from skilled therapy intervention to address the above noted impairments. PLAN :  Recommendations and Planned Interventions: bed mobility training, transfer training, gait training, patient and family training/education and therapeutic activities      Frequency/Duration: Patient will be followed by physical therapy:  4 times a week to address goals. Recommendation for discharge: (in order for the patient to meet his/her long term goals)  Therapy up to 5 days/week in SNF setting vs HHPT with 24 hour supervision    This discharge recommendation:  Has been made in collaboration with the attending provider and/or case management    IF patient discharges home will need the following DME: family requesting w/c for community mobiltiy          SUBJECTIVE:   Patient stated I'm feeling better.     OBJECTIVE DATA SUMMARY:   HISTORY:    Past Medical History:   Diagnosis Date    Arthritis     CAD (coronary artery disease)     Diabetes ([facility] [address]     Hypertension     Stroke [facility])      Past Surgical History:   Procedure Laterality Date    COLONOSCOPY N/A 2/16/2018    COLONOSCOPY performed by Pietro Seip, MD at [address] [place] [address]      right knee replacement         Home Situation  Home Environment: Private residence  # Steps to Enter: 4  Rails to Enter: Yes  Hand Rails : Bilateral  One/Two Story Residence: One story  Living Alone: No  Support Systems: Spouse/Significant Other/Partner, Family member(s)  Patient Expects to be Discharged to[de-identified] Private residence  Current DME Used/Available at Home: Walker, rolling, Cane, straight, Raised toilet seat, Tub transfer bench, Walker, rollator  Tub or Shower Type: Tub/Shower combination    EXAMINATION/PRESENTATION/DECISION MAKING:   Critical Behavior:  Neurologic State: Alert  Orientation Level: Appropriate for age, Oriented X4  Cognition: Appropriate decision making, Appropriate for age attention/concentration, Appropriate safety awareness, Follows commands     Hearing: Auditory  Auditory Impairment: None    Range Of Motion:  AROM: Generally decreased, functional                       Strength:    Strength: Generally decreased, functional                    Tone & Sensation:   Tone: Abnormal(tremulous)                              Coordination:  Coordination: Generally decreased, functional    Functional Mobility:  Bed Mobility:     Supine to Sit: Supervision(using bed rail for support)     Scooting: Contact guard assistance; Additional time  Transfers:  Sit to Stand: Minimum assistance; Additional time(decreased anterior weight shift)  Stand to Sit: Contact guard assistance; Additional time        Bed to Chair: Minimum assistance              Balance:   Sitting: Intact  Standing: Impaired  Standing - Static: Good;Constant support  Standing - Dynamic : Fair;Constant support(using RW for support)  Ambulation/Gait Training:  Distance (ft): 100 Feet (ft)  Assistive Device: Walker, rolling;Gait belt  Ambulation - Level of Assistance: Contact guard assistance        Gait Abnormalities: Decreased step clearance;Shuffling gait;Trunk sway increased        Base of Support: Widened     Speed/Shanthi: Pace decreased (<100 feet/min); Shuffled; Slow  Step Length: Left shortened;Right shortened      Gait is slow and unsteady demonstrating short shuffled steps but no overt LOB noted       Functional Measure:    Elder Mobility Scale    5/20         Scores under 10 - generally these patients are dependent in mobility maneuvers; require help with  basic ADL, such as transfers, toileting and dressing.     Scores between 10 - 13 - generally these patients are borderline in terms of safe mobility and  independence in ADL i.e. they require some help with some mobility maneuvers. Scores over 14 - Generally these patients are able to perform mobility maneuvers alone and safely  and are independent in basic ADL. Activity Tolerance:   Fair  Please refer to the flowsheet for vital signs taken during this treatment. After treatment patient left in no apparent distress:   Sitting in chair, Call bell within reach and Caregiver / family present    COMMUNICATION/EDUCATION:   The patients plan of care was discussed with: Registered Nurse and . Fall prevention education was provided and the patient/caregiver indicated understanding., Patient/family have participated as able in goal setting and plan of care. and Patient/family agree to work toward stated goals and plan of care.     Thank you for this referral.  Junaid Harper, PT   Time Calculation: 38 mins

## 2019-11-15 NOTE — PROGRESS NOTES
Nephrology Progress Note     Mitch Eddy     [email or web address]                  Phone - [phone]   Patient: Tessy Rodrigues   Date- 11/15/2019        Admit Date: 11/13/2019  YOB: 1945             CC: Follow up for  rafael on ckd        Subjective: Interval History:   -  Cr. Improving  No sob  bp stable  Hb low  k low  No C/O of chest pain,SOB, vomiting, abdominal pain,fever,chills    ROS:- as above   Assessment & Plan:     rafael  likely due to dehydration,- no hydro on renal usg  - continue ivf  -  hold the torsemide and metolazone. -  No indication for renal replacement therapy at present. -  Avoid ACE inhibitors or ARB. Hypokalemia  - kcl 40 meq po    CKD stage IV, followed by Dr. Vanessa Hodgkins  - ckd likely due to diabetic nephropathy. = Creatinine was 3.4 in 07/2019 and 3.5 in 09/2018. anemia of chronic kidney disease.  - give epogen today    History of hypertension.  - restart norvasc    Abnormal U /A - urine cx showed mixed kathrin. No abx indicated           Physical exam:   GEN:  NAD  NECK:  Supple, no thyromegaly  RESP: CTA  b/l, no  wheezing,   CVS: RRR,S1,S2   ABDO:  soft , non tender, No mass  NEURO: non focal, normal speech  EXT: Edema +nt         Care Plan discussed with: patient  Objective:   Visit Vitals  /69 (BP 1 Location: Left arm, BP Patient Position: At rest)   Pulse 84   Temp 98.3 °F (36.8 °C)   Resp 16   Ht 5' 1\" (1.549 m)   Wt 70.9 kg (156 lb 3.2 oz)   SpO2 98%   BMI 29.51 kg/m²     Last 3 Recorded Weights in this Encounter    11/13/19 1556   Weight: 70.9 kg (156 lb 3.2 oz)     11/13 1901 - 11/15 0700  In: 1200 [P.O.:200; I.V.:1000]  Out: 701 [Urine:700]    Intake/Output Summary (Last 24 hours) at 11/15/2019 0907  Last data filed at 11/14/2019 1019  Gross per 24 hour   Intake --   Output 300 ml   Net -300 ml      Chart reviewed. Pertinent Notes reviewed.        Medication list  reviewed   Current Facility-Administered Medications Medication    0.9% sodium chloride infusion    hydrALAZINE (APRESOLINE) 20 mg/mL injection 20 mg    acetaminophen (TYLENOL) tablet 650 mg    aspirin-dipyridamole (AGGRENOX)  mg per capsule 1 Cap    atorvastatin (LIPITOR) tablet 40 mg    cyanocobalamin (VITAMIN B12) tablet 1,000 mcg    DULoxetine (CYMBALTA) capsule 60 mg    ezetimibe (ZETIA) tablet 10 mg    ferrous sulfate tablet 325 mg    multivit-folic acid-herbal 551 (WELLESSE PLUS) oral liquid 30 mL    sodium chloride (NS) flush 5-40 mL    sodium chloride (NS) flush 5-40 mL    heparin (porcine) injection 5,000 Units    insulin lispro (HUMALOG) injection    glucose chewable tablet 16 g    dextrose (D50W) injection syrg 12.5-25 g    glucagon (GLUCAGEN) injection 1 mg           Data Review :  Recent Labs     11/15/19  0252 11/14/19  0159 11/13/19  1854    141 140   K 3.1* 3.5 3.0*    102 98   CO2 26 31 32   * 131* 141*   CREA 4.16* 5.06* 5.58*   * 173* 166*   CA 8.7 8.9 9.0   PHOS 3.0  --   --      Recent Labs     11/15/19  0252 11/14/19  0159 11/13/19  1759   WBC 7.5 7.8 7.7   HGB 9.8* 10.2* 10.0*   HCT 29.7* 30.5* 31.1*   * 151 151   RENAL USG  FINDINGS:     RIGHT KIDNEY: measures 9.2 cm in length. Atrophic and echogenic. Incidental note  is made of tiny incidental parenchymal cysts and a larger sinus cyst. No  hydronephrosis or large shadowing calculus. LEFT KIDNEY: measures 8.4 cm in length. Atrophic and echogenic. Incidental note  is made of multiple small cysts. No hydronephrosis or large shadowing calculus. AORTA: Normal caliber in its visualized portions. COMMON ILIAC ARTERIES: Normal caliber proximally. IVC: Normal caliber in its visualized portions. BLADDER: Normally distended.        IMPRESSION  IMPRESSION:   Echogenic, atrophic kidneys, consistent with chronic medical renal disease. No  hydronephrosis or large shadowing calculus.   No results for input(s): FE, TIBC, PSAT, FERR in the last 72 hours. No results for input(s): CPK, CKNDX, TROIQ in the last 72 hours. No lab exists for component: CPKMB  Lab Results   Component Value Date/Time    Color YELLOW/STRAW 11/13/2019 07:37 PM    Appearance CLEAR 11/13/2019 07:37 PM    Specific gravity 1.010 11/13/2019 07:37 PM    pH (UA) 7.0 11/13/2019 07:37 PM    Protein TRACE (A) 11/13/2019 07:37 PM    Glucose NEGATIVE  11/13/2019 07:37 PM    Ketone NEGATIVE  11/13/2019 07:37 PM    Bilirubin NEGATIVE  11/13/2019 07:37 PM    Urobilinogen 0.2 11/13/2019 07:37 PM    Nitrites NEGATIVE  11/13/2019 07:37 PM    Leukocyte Esterase MODERATE (A) 11/13/2019 07:37 PM    Epithelial cells FEW 11/13/2019 07:37 PM    Bacteria 1+ (A) 11/13/2019 07:37 PM    WBC 20-50 11/13/2019 07:37 PM    RBC 0-5 11/13/2019 07:37 PM     Lab Results   Component Value Date/Time    Culture result: MIXED UROGENITAL KAYLIE ISOLATED 11/13/2019 07:37 PM    Culture result: NO GROWTH 2 DAYS 11/04/2015 08:24 PM     No results found for: SDES  Lab Results   Component Value Date/Time    Sodium,urine random 73 11/15/2019 04:34 AM    Creatinine, urine 35.20 11/15/2019 04:34 AM       Results from Hospital Encounter encounter on 11/25/16   XR CHEST PORT    Narrative Clinical indication: Chest pain. Portable AP semierect view of the chest is obtained, comparison to thousand 8. The heart size is normal. There is no acute infiltrate. Impression IMPRESSION: No acute findings. Kia Hensley MD  [facility]   [email or web address]  [facility]  [address], [address]  [place], [address]  Phone - [phone]         Fax - [phone]

## 2019-11-16 ENCOUNTER — HOME HEALTH ADMISSION (OUTPATIENT)
Dept: HOME HEALTH SERVICES | Facility: HOME HEALTH | Age: 74
End: 2019-11-16
Payer: MEDICARE

## 2019-11-16 VITALS
DIASTOLIC BLOOD PRESSURE: 89 MMHG | WEIGHT: 156.2 LBS | HEIGHT: 61 IN | OXYGEN SATURATION: 98 % | HEART RATE: 100 BPM | BODY MASS INDEX: 29.49 KG/M2 | RESPIRATION RATE: 16 BRPM | TEMPERATURE: 98.2 F | SYSTOLIC BLOOD PRESSURE: 179 MMHG

## 2019-11-16 LAB
ALBUMIN SERPL-MCNC: 3.4 G/DL (ref 3.5–5)
ANION GAP SERPL CALC-SCNC: 8 MMOL/L (ref 5–15)
BASOPHILS # BLD: 0 K/UL (ref 0–0.1)
BASOPHILS NFR BLD: 0 % (ref 0–1)
BUN SERPL-MCNC: 82 MG/DL (ref 6–20)
BUN/CREAT SERPL: 23 (ref 12–20)
CALCIUM SERPL-MCNC: 9.1 MG/DL (ref 8.5–10.1)
CHLORIDE SERPL-SCNC: 113 MMOL/L (ref 97–108)
CO2 SERPL-SCNC: 25 MMOL/L (ref 21–32)
CREAT SERPL-MCNC: 3.55 MG/DL (ref 0.55–1.02)
DIFFERENTIAL METHOD BLD: ABNORMAL
EOSINOPHIL # BLD: 0.2 K/UL (ref 0–0.4)
EOSINOPHIL NFR BLD: 2 % (ref 0–7)
ERYTHROCYTE [DISTWIDTH] IN BLOOD BY AUTOMATED COUNT: 16.5 % (ref 11.5–14.5)
GLUCOSE BLD STRIP.AUTO-MCNC: 163 MG/DL (ref 65–100)
GLUCOSE BLD STRIP.AUTO-MCNC: 304 MG/DL (ref 65–100)
GLUCOSE SERPL-MCNC: 128 MG/DL (ref 65–100)
HCT VFR BLD AUTO: 30.9 % (ref 35–47)
HGB BLD-MCNC: 9.9 G/DL (ref 11.5–16)
IMM GRANULOCYTES # BLD AUTO: 0 K/UL (ref 0–0.04)
IMM GRANULOCYTES NFR BLD AUTO: 1 % (ref 0–0.5)
LYMPHOCYTES # BLD: 1 K/UL (ref 0.8–3.5)
LYMPHOCYTES NFR BLD: 12 % (ref 12–49)
MCH RBC QN AUTO: 27.9 PG (ref 26–34)
MCHC RBC AUTO-ENTMCNC: 32 G/DL (ref 30–36.5)
MCV RBC AUTO: 87 FL (ref 80–99)
MONOCYTES # BLD: 1 K/UL (ref 0–1)
MONOCYTES NFR BLD: 11 % (ref 5–13)
NEUTS SEG # BLD: 6.5 K/UL (ref 1.8–8)
NEUTS SEG NFR BLD: 74 % (ref 32–75)
NRBC # BLD: 0 K/UL (ref 0–0.01)
NRBC BLD-RTO: 0 PER 100 WBC
PHOSPHATE SERPL-MCNC: 2.3 MG/DL (ref 2.6–4.7)
PLATELET # BLD AUTO: 149 K/UL (ref 150–400)
POTASSIUM SERPL-SCNC: 3.6 MMOL/L (ref 3.5–5.1)
RBC # BLD AUTO: 3.55 M/UL (ref 3.8–5.2)
SERVICE CMNT-IMP: ABNORMAL
SERVICE CMNT-IMP: ABNORMAL
SODIUM SERPL-SCNC: 146 MMOL/L (ref 136–145)
WBC # BLD AUTO: 8.8 K/UL (ref 3.6–11)

## 2019-11-16 PROCEDURE — 82962 GLUCOSE BLOOD TEST: CPT

## 2019-11-16 PROCEDURE — 74011636637 HC RX REV CODE- 636/637: Performed by: INTERNAL MEDICINE

## 2019-11-16 PROCEDURE — 85025 COMPLETE CBC W/AUTO DIFF WBC: CPT

## 2019-11-16 PROCEDURE — 74011250636 HC RX REV CODE- 250/636: Performed by: INTERNAL MEDICINE

## 2019-11-16 PROCEDURE — 74011250637 HC RX REV CODE- 250/637: Performed by: INTERNAL MEDICINE

## 2019-11-16 PROCEDURE — 97165 OT EVAL LOW COMPLEX 30 MIN: CPT

## 2019-11-16 PROCEDURE — 97535 SELF CARE MNGMENT TRAINING: CPT

## 2019-11-16 PROCEDURE — 36415 COLL VENOUS BLD VENIPUNCTURE: CPT

## 2019-11-16 PROCEDURE — 94760 N-INVAS EAR/PLS OXIMETRY 1: CPT

## 2019-11-16 PROCEDURE — 80069 RENAL FUNCTION PANEL: CPT

## 2019-11-16 RX ORDER — TORSEMIDE 100 MG/1
100 TABLET ORAL DAILY
Qty: 30 TAB | Refills: 0 | Status: ON HOLD
Start: 2019-11-16 | End: 2021-06-09 | Stop reason: SDUPTHER

## 2019-11-16 RX ADMIN — INSULIN LISPRO 7 UNITS: 100 INJECTION, SOLUTION INTRAVENOUS; SUBCUTANEOUS at 11:30

## 2019-11-16 RX ADMIN — EZETIMIBE 10 MG: 10 TABLET ORAL at 09:01

## 2019-11-16 RX ADMIN — ATORVASTATIN CALCIUM 40 MG: 40 TABLET, FILM COATED ORAL at 09:01

## 2019-11-16 RX ADMIN — HEPARIN SODIUM 5000 UNITS: 5000 INJECTION INTRAVENOUS; SUBCUTANEOUS at 05:30

## 2019-11-16 RX ADMIN — INSULIN LISPRO 2 UNITS: 100 INJECTION, SOLUTION INTRAVENOUS; SUBCUTANEOUS at 09:01

## 2019-11-16 RX ADMIN — Medication 30 ML: at 09:02

## 2019-11-16 RX ADMIN — CYANOCOBALAMIN TAB 500 MCG 1000 MCG: 500 TAB at 09:01

## 2019-11-16 RX ADMIN — EPOETIN ALFA-EPBX 12000 UNITS: 10000 INJECTION, SOLUTION INTRAVENOUS; SUBCUTANEOUS at 01:10

## 2019-11-16 RX ADMIN — FERROUS SULFATE TAB 325 MG (65 MG ELEMENTAL FE) 325 MG: 325 (65 FE) TAB at 09:01

## 2019-11-16 RX ADMIN — Medication 10 ML: at 07:07

## 2019-11-16 RX ADMIN — SODIUM CHLORIDE 100 ML/HR: 900 INJECTION, SOLUTION INTRAVENOUS at 05:31

## 2019-11-16 RX ADMIN — AMLODIPINE BESYLATE 10 MG: 5 TABLET ORAL at 09:01

## 2019-11-16 RX ADMIN — ASPIRIN AND DIPYRIDAMOLE 1 CAPSULE: 25; 200 CAPSULE, EXTENDED RELEASE ORAL at 09:00

## 2019-11-16 RX ADMIN — DULOXETINE HYDROCHLORIDE 60 MG: 30 CAPSULE, DELAYED RELEASE ORAL at 09:01

## 2019-11-16 NOTE — PROGRESS NOTES
Bedside shift change report given to Casandra Alvarez RN (oncoming nurse) by Doris Redding RN(offgoing nurse). Report included the following information SBAR, Kardex, ED Summary, [facility] and Recent Results.

## 2019-11-16 NOTE — PROGRESS NOTES
Problem: Self Care Deficits Care Plan (Adult)  Goal: *Acute Goals and Plan of Care (Insert Text)  Description    FUNCTIONAL STATUS PRIOR TO ADMISSION: Patient was independent with majority of ADLs/mobility. Reports receiving intermittent assist with LB bathing/dressing and Supervision with bathing. No longer drives. Mostly sedentary at home. Denies Hx of falls. HOME SUPPORT: The patient lived with  who provided some assist with bathing/dressing and transportation. Occupational Therapy Goals  Initiated 11/16/2019  1. Patient will perform grooming tasks standing at the sink with Supervision within 7 day(s). 2.  Patient will perform upper body dressing with Mod I using adaptive strategies/AE PRN within 7 day(s). 3.  Patient will perform lower body dressing with Supervision for standing aspects using adaptive strategies/AE PRN within 7 day(s). 4.  Patient will perform toilet transfers with Supervision using least restrictive device within 7 day(s). 5.  Patient will perform all aspects of toileting with Supervision using least restrictive device within 7 day(s). 6.  Patient will complete safe item retrieval from high/low surfaces during ADLs with Supervision within 7 days. Outcome: Progressing Towards Goal   OCCUPATIONAL THERAPY EVALUATION  Patient: Hilary Vanessa (75 y.o. female)  Date: 11/16/2019  Primary Diagnosis: BASILIA (acute kidney injury) ([facility] [address] [N17.9]  Hypokalemia [E87.6]        Precautions:       ASSESSMENT  Based on the objective data described below, the patient presents with decreased dynamic standing balance and mildly decreased activity tolerance following BASILIA and hypokalemia this admission. Does demonstrate mild L sided weakness from prior R CVA, yet this does not significantly impact her ability to complete ADLs. Overall she is mobilizing with SBA using RW and requiring Min A with LB ADLs.  Pt verbalized she feels more stable using RW at this time and wants to return to device-free mobility and ADL task performance. She is open to both rehab and [place] options to maximize her independence. Reports her  is physically capable to provide assistance with transfers if she were to discharge home. Recommend SNF rehab vs [place] OT pending progress. Current Level of Function Impacting Discharge (ADLs/self-care): SBA with standing aspects of ADLs; Independent to Min A with ADLs    Functional Outcome Measure: The patient scored Total: 60/100 on the Barthel Index outcome measure which is indicative of 40% impaired ability to care for basic self needs/dependency on others; inferred 40% dependency on others for instrumental ADLs. Other factors to consider for discharge: Independent PLOF w/o DME for mobility; Wants W/C for longer community distance mobility     Patient will benefit from skilled therapy intervention to address the above noted impairments. PLAN :  Recommendations and Planned Interventions: self care training, functional mobility training, therapeutic activities, endurance activities, patient education and home safety training    Frequency/Duration: Patient will be followed by occupational therapy 4 times a week to address goals. Recommendation for discharge: (in order for the patient to meet his/her long term goals)  Therapy up to 5 days/week in SNF setting or an intensive home health therapy program with 's assist    This discharge recommendation:  Has not yet been discussed the attending provider and/or case management    IF patient discharges home will need the following DME: Would benefit from long handled AE to maximize independence with LB ADLs; Wants a W/C for longer distance community mobility d/t poor endurance at baseline.          SUBJECTIVE:   Patient stated Oh I know I need this walker right now\"    OBJECTIVE DATA SUMMARY:   HISTORY:   Past Medical History:   Diagnosis Date    Arthritis     CAD (coronary artery disease)     Diabetes ([facility] [address] Hypertension     Stroke [facility])      Past Surgical History:   Procedure Laterality Date    COLONOSCOPY N/A 2/16/2018    COLONOSCOPY performed by Sol Hancock MD at [facility] ENDOSCOPY    [address]      right knee replacement       Expanded or extensive additional review of patient history: Prior R CVA with mild residual L sided weakness; CAD, HTN, DM. Not on HD. Home Situation  Home Environment: Private residence  # Steps to Enter: 4  Rails to Enter: Yes  Hand Rails : Bilateral  One/Two Story Residence: One story  Living Alone: No  Support Systems: Spouse/Significant Other/Partner, Family member(s)  Patient Expects to be Discharged to[de-identified] Private residence  Current DME Used/Available at Home: Cierra Nicole, rolling, [address], [place], straight, Raised toilet seat, Tub transfer bench, Walker, rollator  Tub or Shower Type: Tub/Shower combination(sits on tub transfer bench)    Hand dominance: Right    EXAMINATION OF PERFORMANCE DEFICITS:  Cognitive/Behavioral Status:  Neurologic State: Alert; Appropriate for age  Orientation Level: Oriented X4  Cognition: Appropriate decision making; Follows commands  Perception: Appears intact  Perseveration: No perseveration noted  Safety/Judgement: Awareness of environment; Insight into deficits    Skin: Intact    Edema: None    Hearing: Auditory  Auditory Impairment: None    Vision/Perceptual:                           Acuity: Within Defined Limits;Able to read employee name badge without difficulty    Corrective Lenses: Glasses    Range of Motion:  AROM: Generally decreased, functional  PROM: Generally decreased, functional                      Strength:  Strength: Generally decreased, functional                Coordination:  Coordination: Generally decreased, functional  Fine Motor Skills-Upper: Left Intact; Right Intact    Gross Motor Skills-Upper: Left Intact; Right Intact    Tone & Sensation:  Tone: Abnormal(tremulousness in LUE)  Sensation: Intact                      Balance:  Sitting: Intact  Standing: Intact; With support  Standing - Static: Good;Constant support  Standing - Dynamic : Good;Constant support    Functional Mobility and Transfers for ADLs:  Bed Mobility:  Rolling: (received seated in chair)    Transfers:  Sit to Stand: Stand-by assistance  Stand to Sit: Stand-by assistance(cues for hand placement)  Bed to Chair: Stand-by assistance  Bathroom Mobility: Stand-by assistance  Toilet Transfer : Stand-by assistance  Tub Transfer: Contact guard assistance  Assistive Device : Walker, rolling    ADL Assessment:  Feeding: Independent    Oral Facial Hygiene/Grooming: Stand-by assistance(standing at sink)    Bathing: Minimum assistance    Upper Body Dressing: Minimum assistance(needs assist reaching backside at baseline)    Lower Body Dressing: Minimum assistance(with distal LB aspects)    Toileting: Minimum assistance(with clothing management; Supervision w/ hank care)     ADL Intervention and task modifications:    Grooming  Washing Hands: Supervision;Stand-by assistance(standing at sink)  Cues: Verbal cues provided(for stepping closer to sink for stability)  Adaptive Equipment: (RW)    Lower Body Dressing Assistance  Protective Undergarmet: Minimum assistance(with fasteners only; doffed/donned w/ SBA)  Socks: Minimum assistance  Position Performed: Seated in chair;Bending forward method         Cognitive Retraining  Safety/Judgement: Awareness of environment; Insight into deficits    Functional Measure:  Barthel Index:    Bathin  Bladder: 5  Bowels: 5  Groomin  Dressin  Feeding: 10  Mobility: 10  Stairs: 5  Toilet Use: 5  Transfer (Bed to Chair and Back): 10  Total: 60/100        The Barthel ADL Index: Guidelines  1. The index should be used as a record of what a patient does, not as a record of what a patient could do. 2. The main aim is to establish degree of independence from any help, physical or verbal, however minor and for whatever reason.   3. The need for supervision renders the patient not independent. 4. A patient's performance should be established using the best available evidence. Asking the patient, friends/relatives and nurses are the usual sources, but direct observation and common sense are also important. However direct testing is not needed. 5. Usually the patient's performance over the preceding 24-48 hours is important, but occasionally longer periods will be relevant. 6. Middle categories imply that the patient supplies over 50 per cent of the effort. 7. Use of aids to be independent is allowed. Mennie Barthel., Barthel, D.W. (2287). Functional evaluation: the Barthel Index. [facility] (14)2. Malcolm Durham yuri FRANKLIN Smart, Ginna Gamble, Gloria Aiken., Michelle, [address] (1999). Measuring the change indisability after inpatient rehabilitation; comparison of the responsiveness of the Barthel Index and Functional Mitchell Measure. Journal of Neurology, Neurosurgery, and Psychiatry, 66(4), 084-894. Loretta Marshall, N.J.A, SUDHIR Sellers, & Alisha Marley M.A. (2004.) Assessment of post-stroke quality of life in cost-effectiveness studies: The usefulness of the Barthel Index and the EuroQoL-5D. Quality of Life Research, 15, 819-46         Occupational Therapy Evaluation Charge Determination   History Examination Decision-Making   LOW Complexity : Brief history review  LOW Complexity : 1-3 performance deficits relating to physical, cognitive , or psychosocial skils that result in activity limitations and / or participation restrictions  LOW Complexity : No comorbidities that affect functional and no verbal or physical assistance needed to complete eval tasks       Based on the above components, the patient evaluation is determined to be of the following complexity level: LOW   Pain Rating:  None    Activity Tolerance:   Good  Please refer to the flowsheet for vital signs taken during this treatment.     After treatment patient left in no apparent distress: Sitting in chair and Call bell within reach    COMMUNICATION/EDUCATION:   The patients plan of care was discussed with: Registered Nurse and Patient . Home safety education was provided and the patient/caregiver indicated understanding., Patient/family have participated as able in goal setting and plan of care. and Patient/family agree to work toward stated goals and plan of care.     Thank you for this referral.  Llia Ordoñez, OTR/L  Time Calculation: 19 mins

## 2019-11-16 NOTE — DISCHARGE INSTRUCTIONS
Please bring this form with you to show your primary care provider at your follow-up appointment. Primary care provider:  Dr. Temple Klinefelter, MD    Discharging provider:  Taya Romero MD    You have been admitted to the hospital with the following diagnoses:  · BASILIA (acute kidney injury) ([facility] [address] [N17.9]  · Hypokalemia [E87.6]    FOLLOW-UP CARE RECOMMENDATIONS:    APPOINTMENTS:  Follow-up Information     Follow up With Specialties Details Why Contact Info    Guillaume Li MD Internal Medicine In 2 weeks Discharge follow up  [facility]  [address] [place] [address] [address] [facility]      Tanya Hyatt MD Nephrology In 1 week discharge follow up, repeat renal function  [address] [id number] [phone]              FOLLOW-UP TESTS recommended:   - repeat Renal function test before seeing Mushtaq Tiwari  - [address] until seen by Mushtaq Tiwari however, if start to notice worsening swelling in legs, may resume earlier     PENDING TEST RESULTS:  At the time of your discharge the following test results are still pending: NONE  Please make sure you review these results with your outpatient follow-up provider(s). SYMPTOMS to watch for: chest pain, shortness of breath, fever, chills, nausea, vomiting, diarrhea, change in mentation, falling, weakness, bleeding. DIET/what to eat:  Diabetic Diet    ACTIVITY:  Activity as tolerated    WOUND CARE: NONE    EQUIPMENT needed:  NONE      What to do if new or unexpected symptoms occur? If you experience any of the above symptoms (or should other concerns or questions arise after discharge) please call your primary care physician. Return to the emergency room if you cannot get hold of your doctor. · It is very important that you keep your follow-up appointment(s).   · Please bring discharge papers, medication list (and/or medication bottles) to your follow-up appointments for review by your outpatient provider(s). · Please check the list of medications and be sure it includes every medication (even non-prescription medications) that your provider wants you to take. · It is important that you take the medication exactly as they are prescribed. · Keep your medication in the bottles provided by the pharmacist and keep a list of the medication names, dosages, and times to be taken in your wallet. · Do not take other medications without consulting your doctor. · If you have any questions about your medications or other instructions, please talk to your nurse or care provider before you leave the hospital.    I understand that if any problems occur once I am at home I am to contact my physician. These instructions were explained to me and I had the opportunity to ask questions.

## 2019-11-16 NOTE — PROGRESS NOTES
Discussed discharge instructions in detail. Opportunity given to ask questions Advised pt to obtain belongings prior to discharge.

## 2019-11-16 NOTE — DISCHARGE SUMMARY
Discharge Summary     Patient:  Archie Sorto       MRN: [id number]       YOB: 1945       Age: 69 y.o. Date of admission:  11/13/2019    Date of discharge:  11/16/2019    Primary care provider: Dr. Dave Johansen MD    Admitting provider:  Dania Keyes MD    Discharging provider:  Rachid Malhotra MD - [phone]  If unavailable, call [phone] and ask the  to page the triage hospitalist.    Consultations  · [address]  · IP CONSULT TO HOSPITALIST    Procedures  · * No surgery found *    Discharge destination: HOME with [facility]. The patient is stable for discharge. Admission diagnosis  · BASILIA (acute kidney injury) ([facility] [address] [N17.9]  · Hypokalemia [E87.6]    Current Discharge Medication List      CONTINUE these medications which have CHANGED    Details   torsemide (DEMADEX) 100 mg tablet Take 1 Tab by mouth daily. Hold until seen by Gabrielle Coon  Qty: 30 Tab, Refills: 0         CONTINUE these medications which have NOT CHANGED    Details   amLODIPine (NORVASC) 10 mg tablet Take 10 mg by mouth daily. insulin lispro protamine/insulin lispro (HUMALOG MIX 75-25) 100 unit/mL (75-25) injection 24 Units by SubCUTAneous route two (2) times a day. FOLIC ACID/MV,FE,OTHER MIN (CENTRUM COMPLETE PO) Take 1 Tab by mouth daily. ferrous sulfate 325 mg (65 mg iron) tablet Take 325 mg by mouth two (2) times a day.      ezetimibe (ZETIA) 10 mg tablet Take 10 mg by mouth daily. pioglitazone (ACTOS) 15 mg tablet Take 15 mg by mouth daily. cloNIDine HCl (CATAPRES) 0.1 mg tablet Take 0.1 mg by mouth two (2) times a day. cyanocobalamin 1,000 mcg tablet Take 1,000 mcg by mouth daily. aspirin-dipyridamole (AGGRENOX)  mg per SR capsule Take 1 Cap by mouth two (2) times a day. DULoxetine (CYMBALTA) 60 mg capsule Take 60 mg by mouth daily.       atorvastatin (LIPITOR) 40 mg tablet Take 40 mg by mouth daily. Follow-up Information     Follow up With Specialties Details Why Alysa Cody MD Internal Medicine In 2 weeks Discharge follow up  [facility]  [address] [place] [address] [address] [facility]      Shari Ramirez MD Nephrology In 1 week discharge follow up, repeat renal function  [address] [facility] [employer]  [phone]            Final discharge diagnoses and brief hospital course  Please also refer to the admission H&P for details on the presenting problem. 69 yo female CAD, DM, HTN, CVA, CKD 3, admitted for abnormal kidney function, decreased appetite and weakness.      BASILIA on CKD 4  - possibly related to Torsemide/Metalozone and Poor po intake  - Cr improved to 3.5 at discharge  - f/u with Sarthak Kaplan next   - Hold Torsemide until seen by renal as outpatient.  Did instruct  that if noticing significant swelling in Legs, resume earlier  - discontinue Metalozone   - retroperitoneal US: normal findings for CKD patient      Hx of CVA  - c/w Aggrenox and Lipitor     HLD  - c/w Zetia     DM   - c/w sliding scale   - monitor    General debility  - home with HH, d/w CM        FOLLOW-UP TESTS recommended:   - repeat Renal function test before seeing Sarthak Kaplan  - [address] until seen by Sarthak Kaplan however, if start to notice worsening swelling in legs, may resume earlier      PENDING TEST RESULTS:  At the time of your discharge the following test results are still pending: NONE  Please make sure you review these results with your outpatient follow-up provider(s).     SYMPTOMS to watch for: chest pain, shortness of breath, fever, chills, nausea, vomiting, diarrhea, change in mentation, falling, weakness, bleeding.     DIET/what to eat:  Diabetic Diet     ACTIVITY:  Activity as tolerated     WOUND CARE: NONE     EQUIPMENT needed:  NONE       Physical examination at discharge  Visit Vitals  BP 179/89 (BP 1 Location: Right arm, BP Patient Position: At rest)   Pulse 100   Temp 98.2 °F (36.8 °C)   Resp 16   Ht 5' 1\" (1.549 m)   Wt 70.9 kg (156 lb 3.2 oz)   SpO2 98%   BMI 29.51 kg/m²     Ao3  PERRLA  EOMI  Lugn Clear  CVS: RRR  Abd; soft NT ND   Ext: no edema    Pertinent imaging studies:        Recent Labs     11/16/19  0221 11/15/19  0252 11/14/19  0159   WBC 8.8 7.5 7.8   HGB 9.9* 9.8* 10.2*   HCT 30.9* 29.7* 30.5*   * 142* 151     Recent Labs     11/16/19  0221 11/15/19  0252 11/14/19  0159   * 144 141   K 3.6 3.1* 3.5   * 108 102   CO2 25 26 31   BUN 82* 102* 131*   CREA 3.55* 4.16* 5.06*   * 170* 173*   CA 9.1 8.7 8.9   PHOS 2.3* 3.0  --      Recent Labs     11/16/19  0221 11/15/19  0252 11/13/19  1854   SGOT  --   --  32   AP  --   --  110   TP  --   --  6.9   ALB 3.4* 3.1* 3.2*   GLOB  --   --  3.7     No results for input(s): INR, PTP, APTT, INREXT in the last 72 hours. No results for input(s): FE, TIBC, PSAT, FERR in the last 72 hours. No results for input(s): PH, PCO2, PO2 in the last 72 hours. No results for input(s): CPK, CKMB in the last 72 hours.     No lab exists for component: TROPONINI  No components found for: [place]    Chronic Diagnoses:    Problem List as of 11/16/2019 Date Reviewed: 9/5/2018          Codes Class Noted - Resolved    Acute CVA (cerebrovascular accident) [facility]) ICD-10-CM: I63.9  ICD-9-CM: 434.91 Acute 11/25/2016 - Present        History of CVA (cerebrovascular accident) ICD-10-CM: Z86.73  ICD-9-CM: V12.54 Present on Admission 11/25/2016 - Present        Diabetes ([facility] ICD-10-CM: E11.9  ICD-9-CM: 250.00 Chronic 11/25/2016 - Present        Hypertension ICD-10-CM: I10  ICD-9-CM: 401.9 Chronic 11/25/2016 - Present        Arthritis ICD-10-CM: M19.90  ICD-9-CM: 716.90 Chronic 11/25/2016 - Present        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 4 Chronic 11/25/2016 - Present        Anemia in chronic kidney disease ICD-10-CM: N18.9, D63.1  ICD-9-CM: 285.21 Chronic 11/25/2016 - Present        Hyperlipidemia ICD-10-CM: E78.5  ICD-9-CM: 272.4 Chronic 11/25/2016 - Present        Hypokalemia ICD-10-CM: E87.6  ICD-9-CM: 276.8  11/13/2019 - Present        BASILIA (acute kidney injury) ([facility] ICD-10-CM: N17.9  ICD-9-CM: 584.9  11/13/2019 - Present        Bilateral carotid artery stenosis ICD-10-CM: I65.23  ICD-9-CM: 433.10, 433.30  9/5/2018 - Present        Weakness of left leg ICD-10-CM: R29.898  ICD-9-CM: 729.89  9/4/2018 - Present        Right leg DVT ([facility] ICD-10-CM: I82.401  ICD-9-CM: 453.40  2/19/2017 - Present        Stenosis of both internal carotid arteries ICD-10-CM: I65.23  ICD-9-CM: 433.10, 433.30  11/27/2016 - Present        Cerebral microvascular disease ICD-10-CM: I67.9  ICD-9-CM: 437.9  11/27/2016 - Present        Obesity ICD-10-CM: E66.9  ICD-9-CM: 278.00 Chronic 11/25/2016 - Present    Overview Signed 11/25/2016  2:46 AM by Phoenix Chen MD     Body mass index is 37.79 kg/(m^2). Time spent on discharge related activities today greater than 30 minutes. Signed:  Martinez Ferro MD                 Hospitalist, Internal Medicine      Cc:  Jose Madera MD

## 2019-11-16 NOTE — PROGRESS NOTES
Reason for Admission: Fatigue                 RRAT Score:  23                Resources/supports as identified by patient/family:    The patient resides in a 1 level home with 4 steps to enter with her . She has no children. She has 1 sister and 3 brothers that reside in the [facility] [facility] [place] that are very supportive. Top Challenges facing patient (as identified by patient/family and CM): Finances/Medication cost?  The patient is retired and she reports no financial concerns. She uses the [employer] on [address] [facility] [address] [facility] for her medications                  Transportation? The patient does not drive. Her  assists with transportation needs                Support system or lack thereof? The patient's  and siblings are very supportive                       Living arrangements? The patient resides in a 1 level home with 4 steps to enter with her                Self-care/ADLs/Cognition? The patient is independent with her ADLs. Her  provides supervision while she's bathing. She uses a cane or dolomite walker when ambulating            Current Advanced Directive/Advance Care Plan: The patient is a full code and she does not have a MPOA/AD on-file                           Plan for utilizing home health: The patient would like to use [facility] services, CM has sent the patient's referral for [facility] services                    Transition of Care Plan:   CM met with the patient at bedside to discuss dispo plan. The patient resides in a 1 level home with 4 steps to enter with her . She is independent in her ADLs and her  provides supervision while she is bathing. The patient uses a cane or dolomite walker when ambulating. She does not drive and her  will assist with d/c transportation. She has been recommended for [facility] services and the patient would like to use [facility]. JEANA has sent the patient's referral to [facility] for review.  JEANA will continue to assist with dispo planning. Care Management Interventions  PCP Verified by CM: Yes  Last Visit to PCP: 11/11/19  Mode of Transport at Discharge:  Other (see comment)(The patient's  will transport the patient home. )  Transition of Care Consult (CM Consult): Discharge Planning  MyChart Signup: No  Discharge Durable Medical Equipment: Yes  Physical Therapy Consult: Yes  Occupational Therapy Consult: Yes  Speech Therapy Consult: No  Current Support Network: Lives with Spouse  Confirm Follow Up Transport: Family  Plan discussed with Pt/Family/Caregiver: Yes  Freedom of Choice Offered: Yes   Resource Information Provided?: No  Discharge Location  Discharge Placement: Home with home health    Rico oh LCSW

## 2019-11-16 NOTE — PROGRESS NOTES
Bedside and Verbal shift change report given to Robbin Wheeler (oncoming nurse) by Lex Evans RN (offgoing nurse). Report included the following information SBAR, Kardex, Intake/Output, MAR, Accordion, Recent Results and Med Rec Status.

## 2019-11-17 ENCOUNTER — HOME CARE VISIT (OUTPATIENT)
Dept: SCHEDULING | Facility: HOME HEALTH | Age: 74
End: 2019-11-17
Payer: MEDICARE

## 2019-11-17 PROCEDURE — 3331090001 HH PPS REVENUE CREDIT

## 2019-11-17 PROCEDURE — 400013 HH SOC

## 2019-11-17 PROCEDURE — G0299 HHS/HOSPICE OF RN EA 15 MIN: HCPCS

## 2019-11-17 PROCEDURE — 3331090002 HH PPS REVENUE DEBIT

## 2019-11-18 ENCOUNTER — HOME CARE VISIT (OUTPATIENT)
Dept: SCHEDULING | Facility: HOME HEALTH | Age: 74
End: 2019-11-18
Payer: MEDICARE

## 2019-11-18 PROCEDURE — 3331090002 HH PPS REVENUE DEBIT

## 2019-11-18 PROCEDURE — G0151 HHCP-SERV OF PT,EA 15 MIN: HCPCS

## 2019-11-18 PROCEDURE — 3331090001 HH PPS REVENUE CREDIT

## 2019-11-19 ENCOUNTER — HOME CARE VISIT (OUTPATIENT)
Dept: SCHEDULING | Facility: HOME HEALTH | Age: 74
End: 2019-11-19
Payer: MEDICARE

## 2019-11-19 VITALS
SYSTOLIC BLOOD PRESSURE: 144 MMHG | OXYGEN SATURATION: 96 % | DIASTOLIC BLOOD PRESSURE: 76 MMHG | BODY MASS INDEX: 30.61 KG/M2 | HEART RATE: 74 BPM | TEMPERATURE: 98.4 F | WEIGHT: 162 LBS | RESPIRATION RATE: 16 BRPM

## 2019-11-19 VITALS
HEART RATE: 78 BPM | SYSTOLIC BLOOD PRESSURE: 132 MMHG | RESPIRATION RATE: 18 BRPM | OXYGEN SATURATION: 97 % | DIASTOLIC BLOOD PRESSURE: 82 MMHG | TEMPERATURE: 98.9 F

## 2019-11-19 VITALS
RESPIRATION RATE: 18 BRPM | DIASTOLIC BLOOD PRESSURE: 80 MMHG | OXYGEN SATURATION: 99 % | SYSTOLIC BLOOD PRESSURE: 116 MMHG | TEMPERATURE: 98.1 F | HEART RATE: 86 BPM

## 2019-11-19 PROCEDURE — 3331090001 HH PPS REVENUE CREDIT

## 2019-11-19 PROCEDURE — G0299 HHS/HOSPICE OF RN EA 15 MIN: HCPCS

## 2019-11-19 PROCEDURE — 3331090002 HH PPS REVENUE DEBIT

## 2019-11-20 ENCOUNTER — HOME CARE VISIT (OUTPATIENT)
Dept: SCHEDULING | Facility: HOME HEALTH | Age: 74
End: 2019-11-20
Payer: MEDICARE

## 2019-11-20 VITALS
RESPIRATION RATE: 18 BRPM | OXYGEN SATURATION: 97 % | HEART RATE: 67 BPM | TEMPERATURE: 98.1 F | SYSTOLIC BLOOD PRESSURE: 145 MMHG | DIASTOLIC BLOOD PRESSURE: 85 MMHG

## 2019-11-20 PROCEDURE — 3331090001 HH PPS REVENUE CREDIT

## 2019-11-20 PROCEDURE — G0152 HHCP-SERV OF OT,EA 15 MIN: HCPCS

## 2019-11-20 PROCEDURE — 3331090002 HH PPS REVENUE DEBIT

## 2019-11-21 ENCOUNTER — HOME CARE VISIT (OUTPATIENT)
Dept: SCHEDULING | Facility: HOME HEALTH | Age: 74
End: 2019-11-21
Payer: MEDICARE

## 2019-11-21 VITALS
HEART RATE: 68 BPM | SYSTOLIC BLOOD PRESSURE: 120 MMHG | TEMPERATURE: 97.9 F | BODY MASS INDEX: 30.42 KG/M2 | WEIGHT: 161 LBS | OXYGEN SATURATION: 97 % | RESPIRATION RATE: 16 BRPM | DIASTOLIC BLOOD PRESSURE: 74 MMHG

## 2019-11-21 PROCEDURE — G0299 HHS/HOSPICE OF RN EA 15 MIN: HCPCS

## 2019-11-21 PROCEDURE — G0151 HHCP-SERV OF PT,EA 15 MIN: HCPCS

## 2019-11-21 PROCEDURE — 3331090002 HH PPS REVENUE DEBIT

## 2019-11-21 PROCEDURE — 3331090001 HH PPS REVENUE CREDIT

## 2019-11-22 PROCEDURE — 3331090002 HH PPS REVENUE DEBIT

## 2019-11-22 PROCEDURE — 3331090001 HH PPS REVENUE CREDIT

## 2019-11-23 PROCEDURE — 3331090001 HH PPS REVENUE CREDIT

## 2019-11-23 PROCEDURE — 3331090002 HH PPS REVENUE DEBIT

## 2019-11-24 PROCEDURE — 3331090002 HH PPS REVENUE DEBIT

## 2019-11-24 PROCEDURE — 3331090001 HH PPS REVENUE CREDIT

## 2019-11-25 ENCOUNTER — HOME CARE VISIT (OUTPATIENT)
Dept: SCHEDULING | Facility: HOME HEALTH | Age: 74
End: 2019-11-25
Payer: MEDICARE

## 2019-11-25 VITALS
HEART RATE: 56 BPM | RESPIRATION RATE: 16 BRPM | TEMPERATURE: 98 F | WEIGHT: 161 LBS | OXYGEN SATURATION: 98 % | DIASTOLIC BLOOD PRESSURE: 72 MMHG | SYSTOLIC BLOOD PRESSURE: 158 MMHG | BODY MASS INDEX: 30.42 KG/M2

## 2019-11-25 VITALS
HEART RATE: 56 BPM | DIASTOLIC BLOOD PRESSURE: 72 MMHG | WEIGHT: 161 LBS | RESPIRATION RATE: 18 BRPM | TEMPERATURE: 98 F | BODY MASS INDEX: 30.42 KG/M2 | SYSTOLIC BLOOD PRESSURE: 158 MMHG | OXYGEN SATURATION: 98 %

## 2019-11-25 VITALS
TEMPERATURE: 98 F | SYSTOLIC BLOOD PRESSURE: 136 MMHG | RESPIRATION RATE: 16 BRPM | HEART RATE: 62 BPM | OXYGEN SATURATION: 94 % | DIASTOLIC BLOOD PRESSURE: 74 MMHG

## 2019-11-25 PROCEDURE — G0151 HHCP-SERV OF PT,EA 15 MIN: HCPCS

## 2019-11-25 PROCEDURE — 3331090002 HH PPS REVENUE DEBIT

## 2019-11-25 PROCEDURE — 3331090001 HH PPS REVENUE CREDIT

## 2019-11-25 PROCEDURE — G0299 HHS/HOSPICE OF RN EA 15 MIN: HCPCS

## 2019-11-26 PROCEDURE — 3331090002 HH PPS REVENUE DEBIT

## 2019-11-26 PROCEDURE — 3331090001 HH PPS REVENUE CREDIT

## 2019-11-27 ENCOUNTER — HOME CARE VISIT (OUTPATIENT)
Dept: HOME HEALTH SERVICES | Facility: HOME HEALTH | Age: 74
End: 2019-11-27
Payer: MEDICARE

## 2019-11-27 PROCEDURE — 3331090001 HH PPS REVENUE CREDIT

## 2019-11-27 PROCEDURE — 3331090002 HH PPS REVENUE DEBIT

## 2019-11-28 PROCEDURE — 3331090002 HH PPS REVENUE DEBIT

## 2019-11-28 PROCEDURE — 3331090001 HH PPS REVENUE CREDIT

## 2019-11-29 ENCOUNTER — HOME CARE VISIT (OUTPATIENT)
Dept: SCHEDULING | Facility: HOME HEALTH | Age: 74
End: 2019-11-29
Payer: MEDICARE

## 2019-11-29 VITALS — DIASTOLIC BLOOD PRESSURE: 70 MMHG | SYSTOLIC BLOOD PRESSURE: 142 MMHG | TEMPERATURE: 97 F

## 2019-11-29 PROCEDURE — G0151 HHCP-SERV OF PT,EA 15 MIN: HCPCS

## 2019-11-29 PROCEDURE — 3331090002 HH PPS REVENUE DEBIT

## 2019-11-29 PROCEDURE — G0152 HHCP-SERV OF OT,EA 15 MIN: HCPCS

## 2019-11-29 PROCEDURE — 3331090001 HH PPS REVENUE CREDIT

## 2019-11-30 PROCEDURE — 3331090001 HH PPS REVENUE CREDIT

## 2019-11-30 PROCEDURE — 3331090002 HH PPS REVENUE DEBIT

## 2019-12-01 PROCEDURE — 3331090001 HH PPS REVENUE CREDIT

## 2019-12-01 PROCEDURE — 3331090002 HH PPS REVENUE DEBIT

## 2019-12-02 ENCOUNTER — HOME CARE VISIT (OUTPATIENT)
Dept: SCHEDULING | Facility: HOME HEALTH | Age: 74
End: 2019-12-02
Payer: MEDICARE

## 2019-12-02 VITALS
TEMPERATURE: 98.3 F | BODY MASS INDEX: 31.55 KG/M2 | RESPIRATION RATE: 16 BRPM | OXYGEN SATURATION: 98 % | DIASTOLIC BLOOD PRESSURE: 82 MMHG | SYSTOLIC BLOOD PRESSURE: 138 MMHG | HEART RATE: 71 BPM | WEIGHT: 167 LBS

## 2019-12-02 VITALS
RESPIRATION RATE: 18 BRPM | DIASTOLIC BLOOD PRESSURE: 78 MMHG | OXYGEN SATURATION: 99 % | HEART RATE: 73 BPM | SYSTOLIC BLOOD PRESSURE: 144 MMHG | TEMPERATURE: 97.8 F

## 2019-12-02 VITALS
OXYGEN SATURATION: 98 % | RESPIRATION RATE: 16 BRPM | SYSTOLIC BLOOD PRESSURE: 142 MMHG | TEMPERATURE: 97 F | DIASTOLIC BLOOD PRESSURE: 70 MMHG | HEART RATE: 77 BPM

## 2019-12-02 PROCEDURE — G0152 HHCP-SERV OF OT,EA 15 MIN: HCPCS

## 2019-12-02 PROCEDURE — 3331090002 HH PPS REVENUE DEBIT

## 2019-12-02 PROCEDURE — G0151 HHCP-SERV OF PT,EA 15 MIN: HCPCS

## 2019-12-02 PROCEDURE — 3331090001 HH PPS REVENUE CREDIT

## 2019-12-03 ENCOUNTER — HOME CARE VISIT (OUTPATIENT)
Dept: SCHEDULING | Facility: HOME HEALTH | Age: 74
End: 2019-12-03
Payer: MEDICARE

## 2019-12-03 PROCEDURE — 3331090001 HH PPS REVENUE CREDIT

## 2019-12-03 PROCEDURE — 3331090002 HH PPS REVENUE DEBIT

## 2019-12-04 ENCOUNTER — HOME CARE VISIT (OUTPATIENT)
Dept: SCHEDULING | Facility: HOME HEALTH | Age: 74
End: 2019-12-04
Payer: MEDICARE

## 2019-12-04 VITALS
HEART RATE: 67 BPM | RESPIRATION RATE: 18 BRPM | TEMPERATURE: 97.3 F | DIASTOLIC BLOOD PRESSURE: 82 MMHG | SYSTOLIC BLOOD PRESSURE: 145 MMHG | OXYGEN SATURATION: 99 %

## 2019-12-04 PROCEDURE — 3331090001 HH PPS REVENUE CREDIT

## 2019-12-04 PROCEDURE — G0152 HHCP-SERV OF OT,EA 15 MIN: HCPCS

## 2019-12-04 PROCEDURE — 3331090002 HH PPS REVENUE DEBIT

## 2019-12-05 ENCOUNTER — HOME CARE VISIT (OUTPATIENT)
Dept: SCHEDULING | Facility: HOME HEALTH | Age: 74
End: 2019-12-05
Payer: MEDICARE

## 2019-12-05 PROCEDURE — G0151 HHCP-SERV OF PT,EA 15 MIN: HCPCS

## 2019-12-05 PROCEDURE — 3331090002 HH PPS REVENUE DEBIT

## 2019-12-05 PROCEDURE — 3331090001 HH PPS REVENUE CREDIT

## 2019-12-06 ENCOUNTER — HOME CARE VISIT (OUTPATIENT)
Dept: SCHEDULING | Facility: HOME HEALTH | Age: 74
End: 2019-12-06
Payer: MEDICARE

## 2019-12-06 VITALS
HEART RATE: 67 BPM | WEIGHT: 166 LBS | BODY MASS INDEX: 31.37 KG/M2 | TEMPERATURE: 98.3 F | RESPIRATION RATE: 18 BRPM | OXYGEN SATURATION: 94 % | DIASTOLIC BLOOD PRESSURE: 70 MMHG | SYSTOLIC BLOOD PRESSURE: 156 MMHG

## 2019-12-06 PROCEDURE — 3331090002 HH PPS REVENUE DEBIT

## 2019-12-06 PROCEDURE — G0299 HHS/HOSPICE OF RN EA 15 MIN: HCPCS

## 2019-12-06 PROCEDURE — 3331090001 HH PPS REVENUE CREDIT

## 2019-12-07 PROCEDURE — 3331090002 HH PPS REVENUE DEBIT

## 2019-12-07 PROCEDURE — 3331090001 HH PPS REVENUE CREDIT

## 2019-12-08 PROCEDURE — 3331090002 HH PPS REVENUE DEBIT

## 2019-12-08 PROCEDURE — 3331090001 HH PPS REVENUE CREDIT

## 2019-12-09 ENCOUNTER — HOME CARE VISIT (OUTPATIENT)
Dept: SCHEDULING | Facility: HOME HEALTH | Age: 74
End: 2019-12-09
Payer: MEDICARE

## 2019-12-09 VITALS
TEMPERATURE: 98.1 F | OXYGEN SATURATION: 93 % | HEART RATE: 68 BPM | RESPIRATION RATE: 20 BRPM | BODY MASS INDEX: 30.99 KG/M2 | WEIGHT: 164 LBS | SYSTOLIC BLOOD PRESSURE: 150 MMHG | DIASTOLIC BLOOD PRESSURE: 80 MMHG

## 2019-12-09 PROCEDURE — G0299 HHS/HOSPICE OF RN EA 15 MIN: HCPCS

## 2019-12-09 PROCEDURE — 3331090002 HH PPS REVENUE DEBIT

## 2019-12-09 PROCEDURE — G0151 HHCP-SERV OF PT,EA 15 MIN: HCPCS

## 2019-12-09 PROCEDURE — 3331090001 HH PPS REVENUE CREDIT

## 2019-12-10 VITALS
RESPIRATION RATE: 16 BRPM | SYSTOLIC BLOOD PRESSURE: 152 MMHG | TEMPERATURE: 98.3 F | BODY MASS INDEX: 30.99 KG/M2 | DIASTOLIC BLOOD PRESSURE: 68 MMHG | WEIGHT: 164 LBS | HEART RATE: 78 BPM

## 2019-12-10 PROCEDURE — 3331090002 HH PPS REVENUE DEBIT

## 2019-12-10 PROCEDURE — 3331090001 HH PPS REVENUE CREDIT

## 2019-12-11 PROCEDURE — 3331090001 HH PPS REVENUE CREDIT

## 2019-12-11 PROCEDURE — 3331090002 HH PPS REVENUE DEBIT

## 2019-12-12 ENCOUNTER — HOME CARE VISIT (OUTPATIENT)
Dept: SCHEDULING | Facility: HOME HEALTH | Age: 74
End: 2019-12-12
Payer: MEDICARE

## 2019-12-12 PROCEDURE — 3331090002 HH PPS REVENUE DEBIT

## 2019-12-12 PROCEDURE — 3331090001 HH PPS REVENUE CREDIT

## 2019-12-13 PROCEDURE — 3331090002 HH PPS REVENUE DEBIT

## 2019-12-13 PROCEDURE — 3331090001 HH PPS REVENUE CREDIT

## 2019-12-14 PROCEDURE — 3331090001 HH PPS REVENUE CREDIT

## 2019-12-14 PROCEDURE — 3331090002 HH PPS REVENUE DEBIT

## 2019-12-15 PROCEDURE — 3331090001 HH PPS REVENUE CREDIT

## 2019-12-15 PROCEDURE — 3331090002 HH PPS REVENUE DEBIT

## 2019-12-16 VITALS
TEMPERATURE: 97.8 F | BODY MASS INDEX: 30.99 KG/M2 | WEIGHT: 164 LBS | OXYGEN SATURATION: 98 % | HEART RATE: 74 BPM | SYSTOLIC BLOOD PRESSURE: 148 MMHG | DIASTOLIC BLOOD PRESSURE: 78 MMHG | RESPIRATION RATE: 18 BRPM

## 2019-12-16 PROCEDURE — 3331090001 HH PPS REVENUE CREDIT

## 2019-12-16 PROCEDURE — 3331090002 HH PPS REVENUE DEBIT

## 2019-12-17 ENCOUNTER — HOME CARE VISIT (OUTPATIENT)
Dept: SCHEDULING | Facility: HOME HEALTH | Age: 74
End: 2019-12-17
Payer: MEDICARE

## 2019-12-17 PROCEDURE — 3331090002 HH PPS REVENUE DEBIT

## 2019-12-17 PROCEDURE — 3331090001 HH PPS REVENUE CREDIT

## 2019-12-17 PROCEDURE — G0300 HHS/HOSPICE OF LPN EA 15 MIN: HCPCS

## 2019-12-18 VITALS
DIASTOLIC BLOOD PRESSURE: 80 MMHG | OXYGEN SATURATION: 97 % | SYSTOLIC BLOOD PRESSURE: 138 MMHG | BODY MASS INDEX: 31.18 KG/M2 | TEMPERATURE: 98.1 F | HEART RATE: 60 BPM | WEIGHT: 165 LBS | RESPIRATION RATE: 18 BRPM

## 2019-12-18 PROCEDURE — 3331090002 HH PPS REVENUE DEBIT

## 2019-12-18 PROCEDURE — 3331090001 HH PPS REVENUE CREDIT

## 2019-12-19 PROCEDURE — 3331090002 HH PPS REVENUE DEBIT

## 2019-12-19 PROCEDURE — 3331090001 HH PPS REVENUE CREDIT

## 2019-12-20 PROCEDURE — 3331090001 HH PPS REVENUE CREDIT

## 2019-12-20 PROCEDURE — 3331090002 HH PPS REVENUE DEBIT

## 2019-12-21 PROCEDURE — 3331090001 HH PPS REVENUE CREDIT

## 2019-12-21 PROCEDURE — 3331090002 HH PPS REVENUE DEBIT

## 2019-12-22 PROCEDURE — 3331090002 HH PPS REVENUE DEBIT

## 2019-12-22 PROCEDURE — 3331090001 HH PPS REVENUE CREDIT

## 2019-12-23 PROCEDURE — 3331090002 HH PPS REVENUE DEBIT

## 2019-12-23 PROCEDURE — 3331090001 HH PPS REVENUE CREDIT

## 2019-12-24 PROCEDURE — 3331090001 HH PPS REVENUE CREDIT

## 2019-12-24 PROCEDURE — 3331090002 HH PPS REVENUE DEBIT

## 2019-12-25 PROCEDURE — 3331090001 HH PPS REVENUE CREDIT

## 2019-12-25 PROCEDURE — 3331090002 HH PPS REVENUE DEBIT

## 2019-12-26 PROCEDURE — 3331090001 HH PPS REVENUE CREDIT

## 2019-12-26 PROCEDURE — 3331090002 HH PPS REVENUE DEBIT

## 2019-12-27 ENCOUNTER — HOME CARE VISIT (OUTPATIENT)
Dept: SCHEDULING | Facility: HOME HEALTH | Age: 74
End: 2019-12-27
Payer: MEDICARE

## 2019-12-27 VITALS
SYSTOLIC BLOOD PRESSURE: 150 MMHG | OXYGEN SATURATION: 97 % | DIASTOLIC BLOOD PRESSURE: 74 MMHG | HEART RATE: 68 BPM | BODY MASS INDEX: 31.18 KG/M2 | RESPIRATION RATE: 20 BRPM | TEMPERATURE: 98.2 F | WEIGHT: 165 LBS

## 2019-12-27 PROCEDURE — G0299 HHS/HOSPICE OF RN EA 15 MIN: HCPCS

## 2019-12-27 PROCEDURE — 3331090001 HH PPS REVENUE CREDIT

## 2019-12-27 PROCEDURE — 3331090002 HH PPS REVENUE DEBIT

## 2019-12-28 PROCEDURE — 3331090002 HH PPS REVENUE DEBIT

## 2019-12-28 PROCEDURE — 3331090001 HH PPS REVENUE CREDIT

## 2019-12-29 PROCEDURE — 3331090001 HH PPS REVENUE CREDIT

## 2019-12-29 PROCEDURE — 3331090002 HH PPS REVENUE DEBIT

## 2019-12-30 PROCEDURE — 3331090002 HH PPS REVENUE DEBIT

## 2019-12-30 PROCEDURE — 3331090001 HH PPS REVENUE CREDIT

## 2019-12-31 PROCEDURE — 3331090002 HH PPS REVENUE DEBIT

## 2019-12-31 PROCEDURE — 3331090001 HH PPS REVENUE CREDIT

## 2020-01-01 PROCEDURE — 3331090001 HH PPS REVENUE CREDIT

## 2020-01-01 PROCEDURE — 3331090002 HH PPS REVENUE DEBIT

## 2020-01-02 PROCEDURE — 3331090001 HH PPS REVENUE CREDIT

## 2020-01-02 PROCEDURE — 3331090002 HH PPS REVENUE DEBIT

## 2020-01-03 ENCOUNTER — HOME CARE VISIT (OUTPATIENT)
Dept: SCHEDULING | Facility: HOME HEALTH | Age: 75
End: 2020-01-03
Payer: MEDICARE

## 2020-01-03 VITALS
RESPIRATION RATE: 20 BRPM | WEIGHT: 165 LBS | OXYGEN SATURATION: 99 % | BODY MASS INDEX: 31.18 KG/M2 | HEART RATE: 80 BPM | SYSTOLIC BLOOD PRESSURE: 176 MMHG | DIASTOLIC BLOOD PRESSURE: 86 MMHG

## 2020-01-03 PROCEDURE — 3331090002 HH PPS REVENUE DEBIT

## 2020-01-03 PROCEDURE — G0299 HHS/HOSPICE OF RN EA 15 MIN: HCPCS

## 2020-01-03 PROCEDURE — 3331090001 HH PPS REVENUE CREDIT

## 2020-01-04 PROCEDURE — 3331090002 HH PPS REVENUE DEBIT

## 2020-01-04 PROCEDURE — 3331090001 HH PPS REVENUE CREDIT

## 2020-01-05 PROCEDURE — 3331090002 HH PPS REVENUE DEBIT

## 2020-01-05 PROCEDURE — 3331090001 HH PPS REVENUE CREDIT

## 2020-01-06 PROCEDURE — 3331090001 HH PPS REVENUE CREDIT

## 2020-01-06 PROCEDURE — 3331090002 HH PPS REVENUE DEBIT

## 2020-01-07 PROCEDURE — 3331090001 HH PPS REVENUE CREDIT

## 2020-01-07 PROCEDURE — 3331090002 HH PPS REVENUE DEBIT

## 2020-01-08 PROCEDURE — 3331090001 HH PPS REVENUE CREDIT

## 2020-01-08 PROCEDURE — 3331090002 HH PPS REVENUE DEBIT

## 2020-01-09 PROCEDURE — 3331090001 HH PPS REVENUE CREDIT

## 2020-01-09 PROCEDURE — 3331090002 HH PPS REVENUE DEBIT

## 2020-01-10 PROCEDURE — 3331090001 HH PPS REVENUE CREDIT

## 2020-01-10 PROCEDURE — 3331090002 HH PPS REVENUE DEBIT

## 2020-01-11 PROCEDURE — 3331090001 HH PPS REVENUE CREDIT

## 2020-01-11 PROCEDURE — 3331090002 HH PPS REVENUE DEBIT

## 2020-01-12 PROCEDURE — 3331090002 HH PPS REVENUE DEBIT

## 2020-01-12 PROCEDURE — 3331090001 HH PPS REVENUE CREDIT

## 2020-01-13 PROCEDURE — 3331090002 HH PPS REVENUE DEBIT

## 2020-01-13 PROCEDURE — 3331090001 HH PPS REVENUE CREDIT

## 2020-01-14 PROCEDURE — 3331090001 HH PPS REVENUE CREDIT

## 2020-01-14 PROCEDURE — 3331090002 HH PPS REVENUE DEBIT

## 2020-01-15 ENCOUNTER — HOME CARE VISIT (OUTPATIENT)
Dept: SCHEDULING | Facility: HOME HEALTH | Age: 75
End: 2020-01-15
Payer: MEDICARE

## 2020-01-15 VITALS
WEIGHT: 164 LBS | OXYGEN SATURATION: 99 % | TEMPERATURE: 98.6 F | DIASTOLIC BLOOD PRESSURE: 78 MMHG | HEART RATE: 80 BPM | RESPIRATION RATE: 20 BRPM | BODY MASS INDEX: 30.99 KG/M2 | SYSTOLIC BLOOD PRESSURE: 116 MMHG

## 2020-01-15 PROCEDURE — 3331090001 HH PPS REVENUE CREDIT

## 2020-01-15 PROCEDURE — 3331090003 HH PPS REVENUE ADJ

## 2020-01-15 PROCEDURE — 3331090002 HH PPS REVENUE DEBIT

## 2020-01-15 PROCEDURE — G0299 HHS/HOSPICE OF RN EA 15 MIN: HCPCS

## 2020-10-25 ENCOUNTER — APPOINTMENT (OUTPATIENT)
Dept: GENERAL RADIOLOGY | Age: 75
End: 2020-10-25
Attending: EMERGENCY MEDICINE
Payer: MEDICARE

## 2020-10-25 ENCOUNTER — HOSPITAL ENCOUNTER (EMERGENCY)
Age: 75
Discharge: HOME | End: 2020-10-25
Attending: EMERGENCY MEDICINE
Payer: MEDICARE

## 2020-10-25 VITALS
HEART RATE: 87 BPM | RESPIRATION RATE: 18 BRPM | DIASTOLIC BLOOD PRESSURE: 66 MMHG | TEMPERATURE: 98.5 F | SYSTOLIC BLOOD PRESSURE: 156 MMHG | OXYGEN SATURATION: 96 %

## 2020-10-25 DIAGNOSIS — L03.116 LEFT LEG CELLULITIS: Primary | ICD-10-CM

## 2020-10-25 LAB
ALBUMIN SERPL-MCNC: 3.3 G/DL (ref 3.5–5)
ALBUMIN/GLOB SERPL: 0.7 {RATIO} (ref 1.1–2.2)
ALP SERPL-CCNC: 107 U/L (ref 45–117)
ALT SERPL-CCNC: 39 U/L (ref 12–78)
ANION GAP SERPL CALC-SCNC: 6 MMOL/L (ref 5–15)
AST SERPL-CCNC: 38 U/L (ref 15–37)
BASOPHILS # BLD: 0 K/UL (ref 0–0.1)
BASOPHILS NFR BLD: 0 % (ref 0–1)
BILIRUB SERPL-MCNC: 0.5 MG/DL (ref 0.2–1)
BUN SERPL-MCNC: 66 MG/DL (ref 6–20)
BUN/CREAT SERPL: 15 (ref 12–20)
CALCIUM SERPL-MCNC: 9.6 MG/DL (ref 8.5–10.1)
CHLORIDE SERPL-SCNC: 110 MMOL/L (ref 97–108)
CO2 SERPL-SCNC: 26 MMOL/L (ref 21–32)
CREAT SERPL-MCNC: 4.49 MG/DL (ref 0.55–1.02)
DIFFERENTIAL METHOD BLD: ABNORMAL
EOSINOPHIL # BLD: 0 K/UL (ref 0–0.4)
EOSINOPHIL NFR BLD: 0 % (ref 0–7)
ERYTHROCYTE [DISTWIDTH] IN BLOOD BY AUTOMATED COUNT: 16.2 % (ref 11.5–14.5)
GLOBULIN SER CALC-MCNC: 4.5 G/DL (ref 2–4)
GLUCOSE SERPL-MCNC: 161 MG/DL (ref 65–100)
HCT VFR BLD AUTO: 28.9 % (ref 35–47)
HGB BLD-MCNC: 9.7 G/DL (ref 11.5–16)
IMM GRANULOCYTES # BLD AUTO: 0 K/UL (ref 0–0.04)
IMM GRANULOCYTES NFR BLD AUTO: 0 % (ref 0–0.5)
LYMPHOCYTES # BLD: 0.6 K/UL (ref 0.8–3.5)
LYMPHOCYTES NFR BLD: 4 % (ref 12–49)
MCH RBC QN AUTO: 29.3 PG (ref 26–34)
MCHC RBC AUTO-ENTMCNC: 33.6 G/DL (ref 30–36.5)
MCV RBC AUTO: 87.3 FL (ref 80–99)
MONOCYTES # BLD: 0.4 K/UL (ref 0–1)
MONOCYTES NFR BLD: 3 % (ref 5–13)
NEUTS BAND NFR BLD MANUAL: 4 %
NEUTS SEG # BLD: 12.8 K/UL (ref 1.8–8)
NEUTS SEG NFR BLD: 89 % (ref 32–75)
NRBC # BLD: 0 K/UL (ref 0–0.01)
NRBC BLD-RTO: 0 PER 100 WBC
PLATELET # BLD AUTO: 137 K/UL (ref 150–400)
POTASSIUM SERPL-SCNC: 4.3 MMOL/L (ref 3.5–5.1)
PROT SERPL-MCNC: 7.8 G/DL (ref 6.4–8.2)
RBC # BLD AUTO: 3.31 M/UL (ref 3.8–5.2)
RBC MORPH BLD: ABNORMAL
SODIUM SERPL-SCNC: 142 MMOL/L (ref 136–145)
WBC # BLD AUTO: 13.8 K/UL (ref 3.6–11)

## 2020-10-25 PROCEDURE — 85025 COMPLETE CBC W/AUTO DIFF WBC: CPT

## 2020-10-25 PROCEDURE — 96365 THER/PROPH/DIAG IV INF INIT: CPT

## 2020-10-25 PROCEDURE — 99284 EMERGENCY DEPT VISIT MOD MDM: CPT

## 2020-10-25 PROCEDURE — 73590 X-RAY EXAM OF LOWER LEG: CPT

## 2020-10-25 PROCEDURE — 74011250636 HC RX REV CODE- 250/636: Performed by: EMERGENCY MEDICINE

## 2020-10-25 PROCEDURE — 36415 COLL VENOUS BLD VENIPUNCTURE: CPT

## 2020-10-25 PROCEDURE — 80053 COMPREHEN METABOLIC PANEL: CPT

## 2020-10-25 PROCEDURE — 74011000258 HC RX REV CODE- 258: Performed by: EMERGENCY MEDICINE

## 2020-10-25 RX ORDER — CARVEDILOL 12.5 MG/1
25 TABLET ORAL
Status: DISCONTINUED | OUTPATIENT
Start: 2020-10-25 | End: 2020-10-25

## 2020-10-25 RX ORDER — SULFAMETHOXAZOLE AND TRIMETHOPRIM 800; 160 MG/1; MG/1
1 TABLET ORAL 2 TIMES DAILY
Qty: 14 TAB | Refills: 0 | Status: SHIPPED | OUTPATIENT
Start: 2020-10-25 | End: 2020-11-01

## 2020-10-25 RX ORDER — CLONIDINE HYDROCHLORIDE 0.1 MG/1
0.2 TABLET ORAL
Status: DISCONTINUED | OUTPATIENT
Start: 2020-10-25 | End: 2020-10-25

## 2020-10-25 RX ORDER — CEPHALEXIN 500 MG/1
1000 CAPSULE ORAL 3 TIMES DAILY
Qty: 42 CAP | Refills: 0 | Status: SHIPPED | OUTPATIENT
Start: 2020-10-25 | End: 2020-11-01

## 2020-10-25 RX ADMIN — DOXYCYCLINE 100 MG: 100 INJECTION, POWDER, LYOPHILIZED, FOR SOLUTION INTRAVENOUS at 14:09

## 2020-10-25 NOTE — ED TRIAGE NOTES
Patient arrives to ED via EMS stretcher with complaint of an increase in bilateral lower leg edema with associated redness and warmth that was first noted this morning. Patient reports history of HTN. Denies known history of congestive heart failure. Hypertensive currently at 171/64.  Denies chest pain or  Shortness of breath

## 2020-10-25 NOTE — ED PROVIDER NOTES
EMERGENCY DEPARTMENT HISTORY AND PHYSICAL EXAM      Date: 10/25/2020  Patient Name: Riya Geronimo    History of Presenting Illness     Chief Complaint   Patient presents with    Leg Swelling     Increase in bilateral lower leg edema, left worse than right. Associated redness and warmth        History Provided By: Patient and Patient's     HPI: Riya Geronimo, 70 y.o. female with history of diabetes, CKD, hypertension, CAD, CVA presents to the ED with cc of left lower extremity cellulitis. Symptoms have been present for the past week. She complains of progressively worsening leg swelling but states that the redness and warmth to the left leg happened over the past week. Today her leg became much more painful. Denies any fevers at home. Denies any nausea, vomiting, or recent injury or trauma. No history of recurrent cellulitis or abscess. There are no other complaints, changes, or physical findings at this time. PCP: Estephanie Lindsay MD    No current facility-administered medications on file prior to encounter. Current Outpatient Medications on File Prior to Encounter   Medication Sig Dispense Refill    torsemide (DEMADEX) 100 mg tablet Take 1 Tab by mouth daily. Hold until seen by Toya Elaine (Patient taking differently: Take 1 Tab by mouth daily. instructed to take 1/2 tab MWF) 30 Tab 0    ferrous sulfate 325 mg (65 mg iron) tablet Take 325 mg by mouth two (2) times a day.  ezetimibe (ZETIA) 10 mg tablet Take 10 mg by mouth daily.  amLODIPine (NORVASC) 10 mg tablet Take 10 mg by mouth daily.  pioglitazone (ACTOS) 15 mg tablet Take 15 mg by mouth daily.  cloNIDine HCl (CATAPRES) 0.1 mg tablet Take 0.1 mg by mouth two (2) times a day.  cyanocobalamin 1,000 mcg tablet Take 1,000 mcg by mouth daily.  aspirin-dipyridamole (AGGRENOX)  mg per SR capsule Take 1 Cap by mouth two (2) times a day.       insulin lispro protamine/insulin lispro (HUMALOG MIX 75-25) 100 unit/mL (75-25) injection 28 Units by SubCUTAneous route two (2) times a day.  DULoxetine (CYMBALTA) 60 mg capsule Take 60 mg by mouth daily.  atorvastatin (LIPITOR) 40 mg tablet Take 40 mg by mouth daily.  FOLIC ACID/MV,FE,OTHER MIN (CENTRUM COMPLETE PO) Take 1 Tab by mouth daily. Past History     Past Medical History:  Past Medical History:   Diagnosis Date    Arthritis     CAD (coronary artery disease)     Diabetes ([facility] [address]     Hypertension     Stroke [facility])        Past Surgical History:  Past Surgical History:   Procedure Laterality Date    COLONOSCOPY N/A 2/16/2018    COLON[facility]COPY performed [facility] Luis [facility]heta [facility] at [facility] ENDOSCOPY    HX GYN      HX ORTHOPAEDIC      right knee replacement       Family History:  History reviewed. No pertinent family history. Social History:  Social History     Tobacco Use    Smoking status: Never Smoker    Smokeless tobacco: Never Used   Substance Use Topics    Alcohol use: Yes     Comment: rarely     Drug use: No       Allergies:  No Known Allergies      Review of Systems   Review of Systems   Constitutional: Negative for chills and fever. Respiratory: Negative for cough and shortness of breath. Cardiovascular: Negative for chest pain and leg swelling. Gastrointestinal: Negative for abdominal pain, nausea and vomiting. Musculoskeletal: Positive for myalgias. Negative for back pain and gait problem. Skin: Positive for color change. Negative for rash and wound. Neurological: Negative for weakness and numbness. All other systems reviewed and are negative. Physical Exam   Physical Exam  Vitals signs and nursing note reviewed. Constitutional:       General: She is not in acute distress. Appearance: Normal appearance. She is not ill-appearing, toxic-appearing or diaphoretic. HENT:      Head: Normocephalic and atraumatic. Cardiovascular:      Rate and Rhythm: Normal rate and regular rhythm.       Heart sounds: Normal heart sounds. No murmur. Pulmonary:      Effort: Pulmonary effort is normal. No respiratory distress. Breath sounds: Normal breath sounds. No wheezing. Abdominal:      Palpations: Abdomen is soft. Tenderness: There is no abdominal tenderness. There is no guarding or rebound. Musculoskeletal: Normal range of motion. Right lower leg: Edema present. Left lower leg: Edema present. Skin:     Findings: Erythema present. Comments: Left lower extremity with erythema, edema, warmth, and diffuse tenderness to palpation. There is no subcutaneous emphysema or crepitus. There is no pain out of proportion to exam.  No sign of skin breakdown or injury. Neurological:      General: No focal deficit present. Mental Status: She is alert and oriented to person, place, and time. Diagnostic Study Results     Labs -  Labs Reviewed   CBC WITH AUTOMATED DIFF - Abnormal; Notable for the following components:       Result Value    WBC 13.8 (*)     RBC 3.31 (*)     HGB 9.7 (*)     HCT 28.9 (*)     RDW 16.2 (*)     PLATELET 410 (*)     NEUTROPHILS 89 (*)     LYMPHOCYTES 4 (*)     MONOCYTES 3 (*)     ABS. NEUTROPHILS 12.8 (*)     ABS. LYMPHOCYTES 0.6 (*)     All other components within normal limits   METABOLIC PANEL, COMPREHENSIVE - Abnormal; Notable for the following components:    Chloride 110 (*)     Glucose 161 (*)     BUN 66 (*)     Creatinine 4.49 (*)     GFR est AA 12 (*)     GFR est non-AA 10 (*)     AST (SGOT) 38 (*)     Albumin 3.3 (*)     Globulin 4.5 (*)     A-G Ratio 0.7 (*)     All other components within normal limits       Radiologic Studies -   XR TIB/FIB LT   Final Result   IMPRESSION:    1. Diffuse subcutaneous edema throughout the calf and proximal foot with skin   thickening along the dorsal aspect of the foot. This may present some   combination of edema and cellulitis. No evidence of subcutaneous gas or   radiopaque foreign body.            CT Results  (Last 48 hours)    None CXR Results  (Last 48 hours)    None          Medical Decision Making   I am the first provider for this patient. I reviewed the vital signs, available nursing notes, past medical history, past surgical history, family history and social history. Vital Signs-Reviewed the patient's vital signs. Visit Vitals  BP (!) 156/66   Pulse 87   Temp 98.5 °F (36.9 °C)   Resp 18   SpO2 96%       Records Reviewed: Nursing Notes    Provider Notes (Medical Decision Making):   45-year-old female here with left lower extremity redness, swelling likely consistent with cellulitis. No sign of systemic illness. She denies any fevers and she is afebrile and appears clinically well and nontoxic. She is afebrile and vital signs are stable. I have low suspicion for necrotizing fasciitis as there is no subcutaneous crepitus or pain out of proportion to exam.  I will obtain x-ray to rule out foreign body, abscess, or subcutaneous emphysema. I will obtain CBC and BMP and likely administer IV antibiotics here in the ED. She does have risk factors for poor wound healing including diabetes and CKD however she has no sign of systemic illness and will trial with outpatient antibiotics with close PCP follow-up. Laboratory evaluation notable for anemia which is consistent with her baseline likely due to chronic kidney disease. Creatinine is no worse than her baseline. She does have leukocytosis of 13,000 however no signs of systemic illness at this time. No subcutaneous gas notable on plain film. I have outlined area of edema and erythema with skin marker so that she can follow-up closely with PCP or ED as necessary and we can better evaluate progression. ED Course:   Initial assessment performed. The patients presenting problems have been discussed, and they are in agreement with the care plan formulated and outlined with them. I have encouraged them to ask questions as they arise throughout their visit.          Discharge Note:  The patient has been re-evaluated and is ready for discharge. Reviewed available results with patient. Counseled patient on diagnosis and care plan. Patient has expressed understanding, and all questions have been answered. Patient agrees with plan and agrees to follow up as recommended, or to return to the ED if their symptoms worsen. Discharge instructions have been provided and explained to the patient, along with reasons to return to the ED. Disposition:  Discharge home      DISCHARGE PLAN:  1. Discharge Medication List as of 10/25/2020  2:10 PM      START taking these medications    Details   cephALEXin (Keflex) 500 mg capsule Take 2 Caps by mouth three (3) times daily for 7 days. , Print, Disp-42 Cap,R-0      trimethoprim-sulfamethoxazole (Bactrim DS) 160-800 mg per tablet Take 1 Tab by mouth two (2) times a day for 7 days. , Print, Disp-14 Tab,R-0         CONTINUE these medications which have NOT CHANGED    Details   torsemide (DEMADEX) 100 mg tablet Take 1 Tab by mouth daily. Hold until seen by , No Print, Disp-30 Tab, R-0      ferrous sulfate 325 mg (65 mg iron) tablet Take 325 mg by mouth two (2) times a day., Historical Med      ezetimibe (ZETIA) 10 mg tablet Take 10 mg by mouth daily. , Historical Med      amLODIPine (NORVASC) 10 mg tablet Take 10 mg by mouth daily. , Historical Med      pioglitazone (ACTOS) 15 mg tablet Take 15 mg by mouth daily. , Historical Med      cloNIDine HCl (CATAPRES) 0.1 mg tablet Take 0.1 mg by mouth two (2) times a day., Historical Med      cyanocobalamin 1,000 mcg tablet Take 1,000 mcg by mouth daily. , Historical Med      aspirin-dipyridamole (AGGRENOX)  mg per SR capsule Take 1 Cap by mouth two (2) times a day., Historical Med      insulin lispro protamine/insulin lispro (HUMALOG MIX 75-25) 100 unit/mL (75-25) injection 28 Units by SubCUTAneous route two (2) times a day., Historical Med      DULoxetine (CYMBALTA) 60 mg capsule Take 60 mg by mouth daily., Historical Med      atorvastatin (LIPITOR) 40 mg tablet Take 40 mg by mouth daily. , Historical Med      FOLIC ACID/MV,FE,OTHER MIN (CENTRUM COMPLETE PO) Take 1 Tab by mouth daily. , Historical Med           2. Follow-up Information     Follow up With Specialties Details Why Baldomero Arevalo MD Internal Medicine Schedule an appointment as soon as possible for a visit  For wound re-check [address] [place] [address]  [phone]          3. Return to ED if worse     Diagnosis     Clinical Impression:   1. Left leg cellulitis        Attestations:    Zhane Turner MD    Please note that this dictation was completed with [email or web address], the computer voice recognition software. Quite often unanticipated grammatical, syntax, homophones, and other interpretive errors are inadvertently transcribed by the computer software. Please disregard these errors. Please excuse any errors that have escaped final proofreading. Thank you.

## 2020-10-25 NOTE — DISCHARGE INSTRUCTIONS
You were evaluated in the emergency department for pain and swelling to the left leg. You have cellulitis which is a skin infection. Your examination was reassuring as was your work-up including x-ray and blood work. It will be important for you to follow-up with your primary care physician in 2-3 days to ensure it is healing well. Please take the antibiotics prescribed until completion. If you develop worsening symptoms such as worsening pain, swelling, fevers, or inability to walk, please return to the emergency department immediately.

## 2021-04-07 ENCOUNTER — ANESTHESIA EVENT (OUTPATIENT)
Dept: SURGERY | Age: 76
End: 2021-04-07
Payer: MEDICARE

## 2021-04-08 ENCOUNTER — ANESTHESIA (OUTPATIENT)
Dept: SURGERY | Age: 76
End: 2021-04-08
Payer: MEDICARE

## 2021-04-08 ENCOUNTER — HOSPITAL ENCOUNTER (OUTPATIENT)
Age: 76
Setting detail: OUTPATIENT SURGERY
Discharge: HOME | End: 2021-04-08
Payer: MEDICARE

## 2021-04-08 VITALS
SYSTOLIC BLOOD PRESSURE: 122 MMHG | HEART RATE: 73 BPM | TEMPERATURE: 98 F | HEIGHT: 62 IN | WEIGHT: 142 LBS | OXYGEN SATURATION: 98 % | RESPIRATION RATE: 18 BRPM | BODY MASS INDEX: 26.13 KG/M2 | DIASTOLIC BLOOD PRESSURE: 55 MMHG

## 2021-04-08 DIAGNOSIS — N18.6 ESRD (END STAGE RENAL DISEASE) ON DIALYSIS (HCC): Primary | ICD-10-CM

## 2021-04-08 DIAGNOSIS — Z99.2 ESRD (END STAGE RENAL DISEASE) ON DIALYSIS (HCC): Primary | ICD-10-CM

## 2021-04-08 LAB
ANION GAP SERPL CALC-SCNC: 7 MMOL/L (ref 5–15)
BUN SERPL-MCNC: 35 MG/DL (ref 6–20)
BUN/CREAT SERPL: 9 (ref 12–20)
CALCIUM SERPL-MCNC: 8.6 MG/DL (ref 8.5–10.1)
CHLORIDE SERPL-SCNC: 101 MMOL/L (ref 97–108)
CO2 SERPL-SCNC: 29 MMOL/L (ref 21–32)
CREAT SERPL-MCNC: 3.78 MG/DL (ref 0.55–1.02)
GLUCOSE SERPL-MCNC: 123 MG/DL (ref 65–100)
POTASSIUM SERPL-SCNC: 3.7 MMOL/L (ref 3.5–5.1)
SODIUM SERPL-SCNC: 137 MMOL/L (ref 136–145)

## 2021-04-08 PROCEDURE — 2709999900 HC NON-CHARGEABLE SUPPLY

## 2021-04-08 PROCEDURE — 77030003601 HC NDL NRV BLK BBMI -A

## 2021-04-08 PROCEDURE — 77030008462 HC STPLR SKN PROX J&J -A

## 2021-04-08 PROCEDURE — 74011250637 HC RX REV CODE- 250/637: Performed by: ANESTHESIOLOGY

## 2021-04-08 PROCEDURE — 74011250636 HC RX REV CODE- 250/636

## 2021-04-08 PROCEDURE — 74011250636 HC RX REV CODE- 250/636: Performed by: NURSE ANESTHETIST, CERTIFIED REGISTERED

## 2021-04-08 PROCEDURE — 74011000250 HC RX REV CODE- 250: Performed by: NURSE ANESTHETIST, CERTIFIED REGISTERED

## 2021-04-08 PROCEDURE — 77030018831 HC SOL IRR H20 BAXT -A

## 2021-04-08 PROCEDURE — 76210000016 HC OR PH I REC 1 TO 1.5 HR

## 2021-04-08 PROCEDURE — 76010000149 HC OR TIME 1 TO 1.5 HR

## 2021-04-08 PROCEDURE — 76060000033 HC ANESTHESIA 1 TO 1.5 HR

## 2021-04-08 PROCEDURE — 77030040922 HC BLNKT HYPOTHRM STRY -A

## 2021-04-08 PROCEDURE — 80048 BASIC METABOLIC PNL TOTAL CA: CPT

## 2021-04-08 PROCEDURE — 74011000258 HC RX REV CODE- 258: Performed by: NURSE ANESTHETIST, CERTIFIED REGISTERED

## 2021-04-08 PROCEDURE — C1757 CATH, THROMBECTOMY/EMBOLECT: HCPCS

## 2021-04-08 PROCEDURE — 74011250636 HC RX REV CODE- 250/636: Performed by: ANESTHESIOLOGY

## 2021-04-08 PROCEDURE — 36415 COLL VENOUS BLD VENIPUNCTURE: CPT

## 2021-04-08 PROCEDURE — C1768 GRAFT, VASCULAR: HCPCS

## 2021-04-08 PROCEDURE — 77030040361 HC SLV COMPR DVT MDII -B

## 2021-04-08 PROCEDURE — 77030031139 HC SUT VCRL2 J&J -A

## 2021-04-08 PROCEDURE — 77030042556 HC PNCL CAUT -B

## 2021-04-08 DEVICE — VG THIN WALL 7MM X 10CM LINED
Type: IMPLANTABLE DEVICE | Site: ARM | Status: FUNCTIONAL
Brand: GORE-TEX   VASCULAR GRAFT

## 2021-04-08 RX ORDER — SODIUM CHLORIDE 9 MG/ML
INJECTION, SOLUTION INTRAVENOUS
Status: DISCONTINUED | OUTPATIENT
Start: 2021-04-08 | End: 2021-04-08 | Stop reason: HOSPADM

## 2021-04-08 RX ORDER — LIDOCAINE HYDROCHLORIDE 20 MG/ML
INJECTION, SOLUTION EPIDURAL; INFILTRATION; INTRACAUDAL; PERINEURAL AS NEEDED
Status: DISCONTINUED | OUTPATIENT
Start: 2021-04-08 | End: 2021-04-08 | Stop reason: HOSPADM

## 2021-04-08 RX ORDER — DIPHENHYDRAMINE HYDROCHLORIDE 50 MG/ML
12.5 INJECTION, SOLUTION INTRAMUSCULAR; INTRAVENOUS AS NEEDED
Status: DISCONTINUED | OUTPATIENT
Start: 2021-04-08 | End: 2021-04-08 | Stop reason: HOSPADM

## 2021-04-08 RX ORDER — HYDROCODONE BITARTRATE AND ACETAMINOPHEN 7.5; 325 MG/1; MG/1
1 TABLET ORAL
Qty: 20 TAB | Refills: 0 | Status: SHIPPED | OUTPATIENT
Start: 2021-04-08 | End: 2021-04-13

## 2021-04-08 RX ORDER — MORPHINE SULFATE 2 MG/ML
2 INJECTION, SOLUTION INTRAMUSCULAR; INTRAVENOUS
Status: DISCONTINUED | OUTPATIENT
Start: 2021-04-08 | End: 2021-04-08 | Stop reason: HOSPADM

## 2021-04-08 RX ORDER — SODIUM CHLORIDE, SODIUM LACTATE, POTASSIUM CHLORIDE, CALCIUM CHLORIDE 600; 310; 30; 20 MG/100ML; MG/100ML; MG/100ML; MG/100ML
125 INJECTION, SOLUTION INTRAVENOUS CONTINUOUS
Status: DISCONTINUED | OUTPATIENT
Start: 2021-04-08 | End: 2021-04-08 | Stop reason: HOSPADM

## 2021-04-08 RX ORDER — SODIUM CHLORIDE 0.9 % (FLUSH) 0.9 %
5-40 SYRINGE (ML) INJECTION EVERY 8 HOURS
Status: DISCONTINUED | OUTPATIENT
Start: 2021-04-08 | End: 2021-04-08 | Stop reason: HOSPADM

## 2021-04-08 RX ORDER — MIDAZOLAM HYDROCHLORIDE 1 MG/ML
0.5 INJECTION, SOLUTION INTRAMUSCULAR; INTRAVENOUS
Status: DISCONTINUED | OUTPATIENT
Start: 2021-04-08 | End: 2021-04-08 | Stop reason: HOSPADM

## 2021-04-08 RX ORDER — SODIUM CHLORIDE 9 MG/ML
25 INJECTION, SOLUTION INTRAVENOUS CONTINUOUS
Status: DISCONTINUED | OUTPATIENT
Start: 2021-04-08 | End: 2021-04-08 | Stop reason: HOSPADM

## 2021-04-08 RX ORDER — MIDAZOLAM HYDROCHLORIDE 1 MG/ML
1 INJECTION, SOLUTION INTRAMUSCULAR; INTRAVENOUS AS NEEDED
Status: DISCONTINUED | OUTPATIENT
Start: 2021-04-08 | End: 2021-04-08 | Stop reason: HOSPADM

## 2021-04-08 RX ORDER — HYDROMORPHONE HYDROCHLORIDE 1 MG/ML
0.2 INJECTION, SOLUTION INTRAMUSCULAR; INTRAVENOUS; SUBCUTANEOUS
Status: DISCONTINUED | OUTPATIENT
Start: 2021-04-08 | End: 2021-04-08 | Stop reason: HOSPADM

## 2021-04-08 RX ORDER — FENTANYL CITRATE 50 UG/ML
25 INJECTION, SOLUTION INTRAMUSCULAR; INTRAVENOUS
Status: DISCONTINUED | OUTPATIENT
Start: 2021-04-08 | End: 2021-04-08 | Stop reason: HOSPADM

## 2021-04-08 RX ORDER — HEPARIN SODIUM 1000 [USP'U]/ML
INJECTION, SOLUTION INTRAVENOUS; SUBCUTANEOUS AS NEEDED
Status: DISCONTINUED | OUTPATIENT
Start: 2021-04-08 | End: 2021-04-08 | Stop reason: HOSPADM

## 2021-04-08 RX ORDER — ACETAMINOPHEN 325 MG/1
650 TABLET ORAL ONCE
Status: COMPLETED | OUTPATIENT
Start: 2021-04-08 | End: 2021-04-08

## 2021-04-08 RX ORDER — FENTANYL CITRATE 50 UG/ML
50 INJECTION, SOLUTION INTRAMUSCULAR; INTRAVENOUS AS NEEDED
Status: DISCONTINUED | OUTPATIENT
Start: 2021-04-08 | End: 2021-04-08 | Stop reason: HOSPADM

## 2021-04-08 RX ORDER — PROPOFOL 10 MG/ML
INJECTION, EMULSION INTRAVENOUS AS NEEDED
Status: DISCONTINUED | OUTPATIENT
Start: 2021-04-08 | End: 2021-04-08 | Stop reason: HOSPADM

## 2021-04-08 RX ORDER — SODIUM CHLORIDE, SODIUM LACTATE, POTASSIUM CHLORIDE, CALCIUM CHLORIDE 600; 310; 30; 20 MG/100ML; MG/100ML; MG/100ML; MG/100ML
75 INJECTION, SOLUTION INTRAVENOUS CONTINUOUS
Status: DISCONTINUED | OUTPATIENT
Start: 2021-04-08 | End: 2021-04-08 | Stop reason: HOSPADM

## 2021-04-08 RX ORDER — SODIUM CHLORIDE 0.9 % (FLUSH) 0.9 %
5-40 SYRINGE (ML) INJECTION AS NEEDED
Status: DISCONTINUED | OUTPATIENT
Start: 2021-04-08 | End: 2021-04-08 | Stop reason: HOSPADM

## 2021-04-08 RX ORDER — LIDOCAINE HYDROCHLORIDE 10 MG/ML
0.1 INJECTION, SOLUTION EPIDURAL; INFILTRATION; INTRACAUDAL; PERINEURAL AS NEEDED
Status: DISCONTINUED | OUTPATIENT
Start: 2021-04-08 | End: 2021-04-08 | Stop reason: HOSPADM

## 2021-04-08 RX ORDER — ROPIVACAINE HYDROCHLORIDE 5 MG/ML
30 INJECTION, SOLUTION EPIDURAL; INFILTRATION; PERINEURAL ONCE
Status: DISCONTINUED | OUTPATIENT
Start: 2021-04-08 | End: 2021-04-08 | Stop reason: HOSPADM

## 2021-04-08 RX ORDER — PROPOFOL 10 MG/ML
INJECTION, EMULSION INTRAVENOUS
Status: DISCONTINUED | OUTPATIENT
Start: 2021-04-08 | End: 2021-04-08 | Stop reason: HOSPADM

## 2021-04-08 RX ORDER — ONDANSETRON 2 MG/ML
4 INJECTION INTRAMUSCULAR; INTRAVENOUS AS NEEDED
Status: DISCONTINUED | OUTPATIENT
Start: 2021-04-08 | End: 2021-04-08 | Stop reason: HOSPADM

## 2021-04-08 RX ADMIN — SODIUM CHLORIDE 25 ML/HR: 9 INJECTION, SOLUTION INTRAVENOUS at 08:35

## 2021-04-08 RX ADMIN — FENTANYL CITRATE 50 MCG: 50 INJECTION, SOLUTION INTRAMUSCULAR; INTRAVENOUS at 08:48

## 2021-04-08 RX ADMIN — MIDAZOLAM HYDROCHLORIDE 2 MG: 1 INJECTION, SOLUTION INTRAMUSCULAR; INTRAVENOUS at 08:46

## 2021-04-08 RX ADMIN — MEPIVACAINE HYDROCHLORIDE 30 ML: 15 INJECTION, SOLUTION EPIDURAL; INFILTRATION at 08:55

## 2021-04-08 RX ADMIN — PROPOFOL 20 MG: 10 INJECTION, EMULSION INTRAVENOUS at 10:00

## 2021-04-08 RX ADMIN — PROPOFOL 30 MG: 10 INJECTION, EMULSION INTRAVENOUS at 09:10

## 2021-04-08 RX ADMIN — LIDOCAINE HYDROCHLORIDE 40 MG: 20 INJECTION, SOLUTION EPIDURAL; INFILTRATION; INTRACAUDAL; PERINEURAL at 09:09

## 2021-04-08 RX ADMIN — ACETAMINOPHEN 650 MG: 325 TABLET ORAL at 08:20

## 2021-04-08 RX ADMIN — HEPARIN SODIUM 3000 UNITS: 1000 INJECTION INTRAVENOUS; SUBCUTANEOUS at 09:21

## 2021-04-08 RX ADMIN — PROPOFOL 50 MCG/KG/MIN: 10 INJECTION, EMULSION INTRAVENOUS at 09:11

## 2021-04-08 RX ADMIN — SODIUM CHLORIDE: 900 INJECTION, SOLUTION INTRAVENOUS at 09:00

## 2021-04-08 RX ADMIN — SODIUM CHLORIDE: 900 INJECTION, SOLUTION INTRAVENOUS at 10:02

## 2021-04-08 RX ADMIN — CEFAZOLIN SODIUM 2 G: 10 INJECTION, POWDER, FOR SOLUTION INTRAVENOUS at 09:09

## 2021-04-08 NOTE — ANESTHESIA PROCEDURE NOTES
Peripheral Block    Start time: 4/8/2021 8:41 AM  End time: 4/8/2021 8:46 AM  Performed by: Marky Morris MD  Authorized by: Marky Morris MD       Pre-procedure:    Indications: at surgeon's request and primary anesthetic    Preanesthetic Checklist: patient identified, risks and benefits discussed, site marked, timeout performed and patient being monitored    Timeout Time: 08:41          Block Type:   Block Type:  Supraclavicular  Laterality:  Left  Monitoring:  Standard ASA monitoring, continuous pulse ox, frequent vital sign checks, heart rate, responsive to questions and oxygen  Injection Technique:  Single shot  Procedures: ultrasound guided and nerve stimulator    Patient Position: supine  Prep: betadine and povidone-iodine 7.5% surgical scrub    Location:  Supraclavicular  Needle Type:  Stimuplex  Needle Gauge:  22 G  Needle Localization:  Ultrasound guidance  Medication Injected:  Mepivacaine PF (CARBOCAINE) 1.5 % injection, 30 mL    Assessment:  Number of attempts:  1  Injection Assessment:  Incremental injection every 5 mL, local visualized surrounding nerve on ultrasound, negative aspiration for blood, no intravascular symptoms, negative aspiration for CSF, no paresthesia and ultrasound image on chart  Patient tolerance:  Patient tolerated the procedure well with no immediate complications

## 2021-04-08 NOTE — ANESTHESIA POSTPROCEDURE EVALUATION
Post-Anesthesia Evaluation and Assessment    Patient: Debi Lucia MRN: [id number]  SSN: [id number]    YOB: 1945  Age: 70 y.o. Sex: female      I have evaluated the patient and they are stable and ready for discharge from the PACU. Cardiovascular Function/Vital Signs  Visit Vitals  BP (!) 113/55   Pulse 69   Temp 36.7 °C (98 °F)   Resp 17   Ht 5' 2\" (1.575 m)   Wt 64.4 kg (142 lb)   SpO2 100%   BMI 25.97 kg/m²       Patient is status post Regional anesthesia for Procedure(s):  THROMBECTOMY, REVISION OF LEFT UPPER ARM ARTERIO VENOUS GRAFT (REGIONAL BLOCK) (URGENT). Nausea/Vomiting: None    Postoperative hydration reviewed and adequate. Pain:  Pain Scale 1: Numeric (0 - 10) (04/08/21 1027)  Pain Intensity 1: 0 (04/08/21 1027)   Managed    Neurological Status:   Neuro (WDL): Within Defined Limits (04/08/21 0805)   At baseline    Mental Status, Level of Consciousness: Alert and  oriented to person, place, and time    Pulmonary Status:   O2 Device: Nasal cannula (04/08/21 1027)   Adequate oxygenation and airway patent    Complications related to anesthesia: None    Post-anesthesia assessment completed. No concerns    Signed By: Ivana Diaz MD     April 8, 2021              Procedure(s):  THROMBECTOMY, REVISION OF LEFT UPPER ARM ARTERIO VENOUS GRAFT (REGIONAL BLOCK) (URGENT). regional    <BSHSIANPOST>    INITIAL Post-op Vital signs:   Vitals Value Taken Time   /52 04/08/21 1035   Temp 36.7 °C (98 °F) 04/08/21 1027   Pulse 70 04/08/21 1035   Resp 18 04/08/21 1035   SpO2 100 % 04/08/21 1035   Vitals shown include unvalidated device data.

## 2021-04-08 NOTE — ROUTINE PROCESS
Patient: Rebecca Chavira MRN: [id number]  SSN: [id number]   YOB: 1945  Age: 70 y.o. Sex: female     Patient is status post Procedure(s):  THROMBECTOMY, REVISION OF LEFT UPPER ARM ARTERIO VENOUS GRAFT (REGIONAL BLOCK) (URGENT).     Surgeon(s) and Role:     * Ro Gerard MD - Primary    Local/Dose/Irrigation:  None                  Peripheral IV 04/08/21 Right Hand (Active)                           Dressing/Packing:  Incision 04/08/21 Arm Left-Dressing/Treatment: Gauze dressing/dressing sponge;Tape/Soft cloth adhesive tape (04/08/21 1012)    Splint/Cast:  ]    Other:

## 2021-04-08 NOTE — DISCHARGE INSTRUCTIONS
Patient Discharge Instructions    Kenny Adams / [id number] : 1945    Admitted 2021 Discharged: 2021     Take Home Medications     . · It is important that you take the medication exactly as they are prescribed. · Keep your medication in the bottles provided by the pharmacist and keep a list of the medication names, dosages, and times to be taken in your wallet. · Do not take other medications without consulting your doctor. What to do at Home    Recommended diet: Renal Diet,     Recommended activity: Activity as tolerated,     Additional Instructions: remove left arm dressings at dialysis or on Saturday    Follow-up with Dr William Cedeno in 2 weeks, all [phone] for appt        Information obtained by :  I understand that if any problems occur once I am at home I am to contact my physician. I understand and acknowledge receipt of the instructions indicated above.                                                                                                                                            Physician's or R.N.'s Signature                                                                  Date/Time                                                                                                                                              Patient or Representative Signature                                                          Date/Time

## 2021-04-08 NOTE — BRIEF OP NOTE
Brief Postoperative Note    Patient: Viki Lucia  YOB: 1945  MRN: [id number]    Date of Procedure: 4/8/2021     Pre-Op Diagnosis: ESRD with clotted dialysis graft    Post-Op Diagnosis: Same as preoperative diagnosis. Procedure(s):  THROMBECTOMY, REVISION OF LEFT UPPER ARM ARTERIO VENOUS GRAFT     Surgeon(s):  Ramone Lou MD    Surgical Assistant: Surg Asst-1: Taco Contreras    Anesthesia: Regional     Estimated Blood Loss (mL): less than 50     Complications: None    Specimens: * No specimens in log *     Implants:   Implant Name Type Inv.  Item Serial No.  Lot No. LRB No. Used Action   GRAFT VASC L10CM DIA7MM [employer] [address] [place] - [id number]  GRAFT VASC L10CM DIA7MM [employer] [address] [place] [id number] [employer]WD N/A Left 1 Implanted       Drains: * No LDAs found *    Findings: none    Electronically Signed by Maryelizabeth Sandhoff, MD on 4/8/2021 at 10:20 AM

## 2021-04-08 NOTE — ANESTHESIA PREPROCEDURE EVALUATION
Anesthetic History   No history of anesthetic complications            Review of Systems / Medical History  Patient summary reviewed, nursing notes reviewed and pertinent labs reviewed    Pulmonary  Within defined limits                 Neuro/Psych       CVA  TIA     Cardiovascular    Hypertension          CAD         GI/Hepatic/Renal  Within defined limits              Endo/Other    Diabetes    Morbid obesity and arthritis     Other Findings              Physical Exam    Airway  Mallampati: II  TM Distance: > 6 cm  Neck ROM: normal range of motion   Mouth opening: Normal     Cardiovascular  Regular rate and rhythm,  S1 and S2 normal,  no murmur, click, rub, or gallop             Dental  No notable dental hx       Pulmonary  Breath sounds clear to auscultation               Abdominal  GI exam deferred       Other Findings            Anesthetic Plan    ASA: 3  Anesthesia type: regional - supraclavicular block      Post-op pain plan if not by surgeon: peripheral nerve block single      Anesthetic plan and risks discussed with: Patient

## 2021-04-08 NOTE — OP NOTES
[address]  OPERATIVE REPORT    Name:  Lara Connors  MR#:  [id number]  :  1945  ACCOUNT #:  [de-identified]  DATE OF SERVICE:  2021      PREOPERATIVE DIAGNOSIS:  Renal failure with clotted left upper arm dialysis graft. POSTOPERATIVE DIAGNOSIS:  Renal failure with clotted left upper arm dialysis graft. PROCEDURES PERFORMED:  Thrombectomy and revision left upper arm dialysis graft. SURGEON:  Dot England MD    ASSISTANT:  Alyssa Shi. ANESTHESIA:  Regional block. COMPLICATIONS:  None. SPECIMENS REMOVED:  None. IMPLANTS:  7-mm [place]-Rick graft. ESTIMATED BLOOD LOSS:  25 mL. INDICATIONS:  The patient is a 79-year-old female with end-stage renal disease on hemodialysis. She has a left upper arm dialysis graft in place that thrombosed earlier this week. Attempts were made to declot the graft percutaneously, but a guidewire would not pass through the venous outflow tract. She will undergo thrombectomy and revision to a more proximal outflow vein. PROCEDURE:  The patient's left arm was prepped and draped. A transverse incision was made over the medial or venous side of the graft. The graft was identified and opened transversely. A #4 Dee catheter was passed through the graft and through the arterial anastomosis with return of clot and good inflow. The patient was heparinized and the graft flushed with heparinized saline and clamped. A longitudinal incision was then made in the proximal medial upper arm. The brachial vein was identified and dissected free. A 7-mm thin-walled Clare[place]-Rick graft was obtained and after dividing the previous graft at the arteriotomy, an end-to-end anastomosis was performed between the two grafts. The new graft segment was then tunneled to the proximal upper arm incision and sewn end-to-side to the brachial vein using running CV5 Clare[place]-Rick suture. Following this, there was a palpable thrill in the graft.   The incisions were closed with Vicryl subcutaneous suture and skin staples. Dressings were applied and the patient was returned to the recovery room in stable condition.         Merissa Sprague MD      [id number]  D:  04/08/2021 10:25  T:  04/08/2021 12:51  JOB #:  [id number]

## 2021-06-05 ENCOUNTER — APPOINTMENT (OUTPATIENT)
Dept: CT IMAGING | Age: 76
DRG: 377 | End: 2021-06-05
Attending: EMERGENCY MEDICINE
Payer: MEDICARE

## 2021-06-05 ENCOUNTER — HOSPITAL ENCOUNTER (INPATIENT)
Age: 76
LOS: 3 days | Discharge: HOME HEALTH | DRG: 377 | End: 2021-06-09
Attending: EMERGENCY MEDICINE | Admitting: INTERNAL MEDICINE
Payer: MEDICARE

## 2021-06-05 ENCOUNTER — APPOINTMENT (OUTPATIENT)
Dept: GENERAL RADIOLOGY | Age: 76
DRG: 377 | End: 2021-06-05
Attending: EMERGENCY MEDICINE
Payer: MEDICARE

## 2021-06-05 DIAGNOSIS — D64.9 ANEMIA, UNSPECIFIED TYPE: Primary | ICD-10-CM

## 2021-06-05 DIAGNOSIS — R68.84 JAW PAIN: ICD-10-CM

## 2021-06-05 LAB
ALBUMIN SERPL-MCNC: 3.1 G/DL (ref 3.5–5)
ALBUMIN/GLOB SERPL: 1 {RATIO} (ref 1.1–2.2)
ALP SERPL-CCNC: 92 U/L (ref 45–117)
ALT SERPL-CCNC: 23 U/L (ref 12–78)
ANION GAP SERPL CALC-SCNC: 5 MMOL/L (ref 5–15)
AST SERPL-CCNC: 30 U/L (ref 15–37)
BASOPHILS # BLD: 0 K/UL (ref 0–0.1)
BASOPHILS NFR BLD: 0 % (ref 0–1)
BILIRUB SERPL-MCNC: 0.4 MG/DL (ref 0.2–1)
BUN SERPL-MCNC: 61 MG/DL (ref 6–20)
BUN/CREAT SERPL: 13 (ref 12–20)
CALCIUM SERPL-MCNC: 8.5 MG/DL (ref 8.5–10.1)
CHLORIDE SERPL-SCNC: 100 MMOL/L (ref 97–108)
CO2 SERPL-SCNC: 31 MMOL/L (ref 21–32)
CREAT SERPL-MCNC: 4.88 MG/DL (ref 0.55–1.02)
DIFFERENTIAL METHOD BLD: ABNORMAL
EOSINOPHIL # BLD: 0.2 K/UL (ref 0–0.4)
EOSINOPHIL NFR BLD: 2 % (ref 0–7)
ERYTHROCYTE [DISTWIDTH] IN BLOOD BY AUTOMATED COUNT: 17.4 % (ref 11.5–14.5)
GLOBULIN SER CALC-MCNC: 3.2 G/DL (ref 2–4)
GLUCOSE SERPL-MCNC: 115 MG/DL (ref 65–100)
HCT VFR BLD AUTO: 18.6 % (ref 35–47)
HEMOCCULT STL QL: POSITIVE
HGB BLD-MCNC: 6 G/DL (ref 11.5–16)
HISTORY CHECKED?,CKHIST: NORMAL
IMM GRANULOCYTES # BLD AUTO: 0 K/UL (ref 0–0.04)
IMM GRANULOCYTES NFR BLD AUTO: 0 % (ref 0–0.5)
LYMPHOCYTES # BLD: 1.4 K/UL (ref 0.8–3.5)
LYMPHOCYTES NFR BLD: 15 % (ref 12–49)
MCH RBC QN AUTO: 29.9 PG (ref 26–34)
MCHC RBC AUTO-ENTMCNC: 32.3 G/DL (ref 30–36.5)
MCV RBC AUTO: 92.5 FL (ref 80–99)
MONOCYTES # BLD: 1.4 K/UL (ref 0–1)
MONOCYTES NFR BLD: 15 % (ref 5–13)
NEUTS SEG # BLD: 6.6 K/UL (ref 1.8–8)
NEUTS SEG NFR BLD: 68 % (ref 32–75)
NRBC # BLD: 0.03 K/UL (ref 0–0.01)
NRBC BLD-RTO: 0.3 PER 100 WBC
PLATELET # BLD AUTO: 254 K/UL (ref 150–400)
PMV BLD AUTO: 11.7 FL (ref 8.9–12.9)
POTASSIUM SERPL-SCNC: 4.9 MMOL/L (ref 3.5–5.1)
PROT SERPL-MCNC: 6.3 G/DL (ref 6.4–8.2)
RBC # BLD AUTO: 2.01 M/UL (ref 3.8–5.2)
RBC MORPH BLD: ABNORMAL
SODIUM SERPL-SCNC: 136 MMOL/L (ref 136–145)
TROPONIN I BLD-MCNC: 0.05 NG/ML (ref 0–0.08)
WBC # BLD AUTO: 9.6 K/UL (ref 3.6–11)

## 2021-06-05 PROCEDURE — 71045 X-RAY EXAM CHEST 1 VIEW: CPT

## 2021-06-05 PROCEDURE — 86923 COMPATIBILITY TEST ELECTRIC: CPT

## 2021-06-05 PROCEDURE — 80053 COMPREHEN METABOLIC PANEL: CPT

## 2021-06-05 PROCEDURE — 36430 TRANSFUSION BLD/BLD COMPNT: CPT

## 2021-06-05 PROCEDURE — 70450 CT HEAD/BRAIN W/O DYE: CPT

## 2021-06-05 PROCEDURE — 30233N1 TRANSFUSION OF NONAUTOLOGOUS RED BLOOD CELLS INTO PERIPHERAL VEIN, PERCUTANEOUS APPROACH: ICD-10-PCS | Performed by: INTERNAL MEDICINE

## 2021-06-05 PROCEDURE — 82272 OCCULT BLD FECES 1-3 TESTS: CPT

## 2021-06-05 PROCEDURE — 99284 EMERGENCY DEPT VISIT MOD MDM: CPT

## 2021-06-05 PROCEDURE — 85025 COMPLETE CBC W/AUTO DIFF WBC: CPT

## 2021-06-05 PROCEDURE — 84484 ASSAY OF TROPONIN QUANT: CPT

## 2021-06-05 PROCEDURE — P9016 RBC LEUKOCYTES REDUCED: HCPCS

## 2021-06-05 PROCEDURE — 86901 BLOOD TYPING SEROLOGIC RH(D): CPT

## 2021-06-05 PROCEDURE — 93005 ELECTROCARDIOGRAM TRACING: CPT

## 2021-06-05 PROCEDURE — 99218 HC RM OBSERVATION: CPT

## 2021-06-05 PROCEDURE — 36415 COLL VENOUS BLD VENIPUNCTURE: CPT

## 2021-06-05 RX ORDER — ACETAMINOPHEN 650 MG/1
650 SUPPOSITORY RECTAL
Status: DISCONTINUED | OUTPATIENT
Start: 2021-06-05 | End: 2021-06-09 | Stop reason: HOSPADM

## 2021-06-05 RX ORDER — ACETAMINOPHEN 325 MG/1
650 TABLET ORAL
Status: DISCONTINUED | OUTPATIENT
Start: 2021-06-05 | End: 2021-06-09 | Stop reason: HOSPADM

## 2021-06-05 RX ORDER — SODIUM CHLORIDE 0.9 % (FLUSH) 0.9 %
5-40 SYRINGE (ML) INJECTION EVERY 8 HOURS
Status: DISCONTINUED | OUTPATIENT
Start: 2021-06-06 | End: 2021-06-09 | Stop reason: HOSPADM

## 2021-06-05 RX ORDER — POLYETHYLENE GLYCOL 3350 17 G/17G
17 POWDER, FOR SOLUTION ORAL DAILY
Status: DISCONTINUED | OUTPATIENT
Start: 2021-06-06 | End: 2021-06-09 | Stop reason: HOSPADM

## 2021-06-05 RX ORDER — ACETAMINOPHEN 325 MG/1
650 TABLET ORAL
Status: DISCONTINUED | OUTPATIENT
Start: 2021-06-05 | End: 2021-06-05

## 2021-06-05 RX ORDER — VITAMIN E 268 MG
CAPSULE ORAL DAILY
COMMUNITY

## 2021-06-05 RX ORDER — BISACODYL 5 MG
5 TABLET, DELAYED RELEASE (ENTERIC COATED) ORAL DAILY PRN
Status: DISCONTINUED | OUTPATIENT
Start: 2021-06-05 | End: 2021-06-09 | Stop reason: HOSPADM

## 2021-06-05 RX ORDER — PROMETHAZINE HYDROCHLORIDE 25 MG/1
12.5 TABLET ORAL
Status: DISCONTINUED | OUTPATIENT
Start: 2021-06-05 | End: 2021-06-09 | Stop reason: HOSPADM

## 2021-06-05 RX ORDER — HEPARIN SODIUM 5000 [USP'U]/ML
5000 INJECTION, SOLUTION INTRAVENOUS; SUBCUTANEOUS EVERY 8 HOURS
Status: DISCONTINUED | OUTPATIENT
Start: 2021-06-06 | End: 2021-06-06

## 2021-06-05 RX ORDER — ONDANSETRON 2 MG/ML
4 INJECTION INTRAMUSCULAR; INTRAVENOUS
Status: DISCONTINUED | OUTPATIENT
Start: 2021-06-05 | End: 2021-06-09 | Stop reason: HOSPADM

## 2021-06-05 RX ORDER — SODIUM CHLORIDE 0.9 % (FLUSH) 0.9 %
5-40 SYRINGE (ML) INJECTION AS NEEDED
Status: DISCONTINUED | OUTPATIENT
Start: 2021-06-05 | End: 2021-06-09 | Stop reason: HOSPADM

## 2021-06-05 RX ORDER — ALPRAZOLAM 0.25 MG/1
0.25 TABLET ORAL AS NEEDED
COMMUNITY

## 2021-06-05 RX ORDER — SODIUM CHLORIDE 9 MG/ML
250 INJECTION, SOLUTION INTRAVENOUS AS NEEDED
Status: DISCONTINUED | OUTPATIENT
Start: 2021-06-05 | End: 2021-06-09 | Stop reason: HOSPADM

## 2021-06-05 RX ORDER — LANOLIN ALCOHOL/MO/W.PET/CERES
1000 CREAM (GRAM) TOPICAL DAILY
COMMUNITY
End: 2021-06-09

## 2021-06-05 RX ORDER — HYDROCODONE BITARTRATE AND ACETAMINOPHEN 7.5; 325 MG/1; MG/1
TABLET ORAL AS NEEDED
COMMUNITY
End: 2021-06-09

## 2021-06-05 RX ORDER — ONDANSETRON 2 MG/ML
4 INJECTION INTRAMUSCULAR; INTRAVENOUS
Status: DISCONTINUED | OUTPATIENT
Start: 2021-06-05 | End: 2021-06-05

## 2021-06-05 NOTE — ED NOTES
Several attempts made to establish IV access unsuccessful. Pt only has one arm. MD Beatriz Herrera made aware. Elevated to Lead tech.

## 2021-06-05 NOTE — ED NOTES
Bedside shift change report given to Stephanie Amanda [phone]  (oncoming nurse) by Bhargav RN  (offgoing nurse). Report included the following information SBAR, Kardex and ED Summary.

## 2021-06-05 NOTE — ED NOTES
Pt denies pain on examination but states it comes and goes.  Pt returned and placed in room from radiology

## 2021-06-05 NOTE — ED PROVIDER NOTES
EMERGENCY DEPARTMENT HISTORY AND PHYSICAL EXAM      Date: 6/5/2021  Patient Name: Vaibhav Mireles    History of Presenting Illness     Chief Complaint   Patient presents with    Jaw Pain     Into triage via wheelchair with c/o intermittent Rt jaw pain x 2 pm yesterday. Reports stroke several years ago and is concerned her jaw pain may be r/t a stroke. Denies any further sx or concerns. Denies jaw injury and/or dental pain. History Provided By: Patient    HPI: Vaibhav Mireles, 70 y.o. female  presents to the ED with cc of right jaw pain. Patient states since about 2 PM yesterday she has been having episodes of right jaw pain. She states it is throbbing in nature. It is currently not present. It is not worse with chewing or eating. It is not worse with exertion such as walking or going upstairs. Her primary concern is her history of stroke and is concerned if it is another stroke. She denies any dental pain or complaints. No fevers or chills. No visual changes. No chest pain or shortness of breath. She is still currently on Aggrenox. Although medical history does note coronary artery disease her and her  both states she has not had any history of coronary disease. She does not have any cardiac stents. Past History     Past Medical History:  Past Medical History:   Diagnosis Date    Arthritis     CAD (coronary artery disease)     Diabetes ([facility] [address]     Hypertension     Stroke [facility])        Past Surgical History:  Past Surgical History:   Procedure Laterality Date    COLONOSCOPY N/A 2/16/2018    COLONOSCOPY performed by Juan C Myers MD at [facility] ENDOSCOPY    HX GYN      HX ORTHOPAEDIC      right knee replacement       Medications:  No current facility-administered medications on file prior to encounter. Current Outpatient Medications on File Prior to Encounter   Medication Sig Dispense Refill    torsemide (DEMADEX) 100 mg tablet Take 1 Tab by mouth daily.  Hold until seen by Mariana Will (Patient taking differently: Take 1 Tab by mouth daily. instructed to take 1/2 tab MWF) 30 Tab 0    ferrous sulfate 325 mg (65 mg iron) tablet Take 325 mg by mouth two (2) times a day.  ezetimibe (ZETIA) 10 mg tablet Take 10 mg by mouth daily.  amLODIPine (NORVASC) 10 mg tablet Take 10 mg by mouth daily.  pioglitazone (ACTOS) 15 mg tablet Take 15 mg by mouth daily.  cloNIDine HCl (CATAPRES) 0.1 mg tablet Take 0.1 mg by mouth two (2) times a day.  cyanocobalamin 1,000 mcg tablet Take 1,000 mcg by mouth daily.  aspirin-dipyridamole (AGGRENOX)  mg per SR capsule Take 1 Cap by mouth two (2) times a day.  insulin lispro protamine/insulin lispro (HUMALOG MIX 75-25) 100 unit/mL (75-25) injection 28 Units by SubCUTAneous route two (2) times a day.  DULoxetine (CYMBALTA) 60 mg capsule Take 60 mg by mouth daily.  atorvastatin (LIPITOR) 40 mg tablet Take 40 mg by mouth daily.  FOLIC ACID/MV,FE,OTHER MIN (CENTRUM COMPLETE PO) Take 1 Tab by mouth daily. Family History:  No family history on file. Social History:  Social History     Tobacco Use    Smoking status: Never Smoker    Smokeless tobacco: Never Used   Substance Use Topics    Alcohol use: Yes     Comment: rarely     Drug use: No       Allergies:  No Known Allergies    All the above components of the past  history are auto-populated from the electronic record. They have been reviewed and the patient has been interviewed for any pertinent past history that pertains to the patient's chief complaint and reason for visit. Not all pre-populated components may be accurate at the time this note was generated. Review of Systems   Review of Systems   Constitutional: Negative for chills and fever. HENT: Negative for congestion, ear pain, rhinorrhea, sore throat and trouble swallowing. Right jaw pain   Eyes: Negative for visual disturbance.    Respiratory: Negative for cough, chest tightness and shortness of breath. Cardiovascular: Negative for chest pain and palpitations. Gastrointestinal: Negative for abdominal pain, blood in stool, constipation, diarrhea, nausea and vomiting. Genitourinary: Negative for decreased urine volume, difficulty urinating, dysuria and frequency. Musculoskeletal: Negative for back pain and neck pain. Skin: Negative for color change and rash. Neurological: Negative for dizziness, weakness, light-headedness and headaches. Physical Exam   Physical Exam  Vitals and nursing note reviewed. Constitutional:       General: She is not in acute distress. Appearance: She is well-developed. She is not ill-appearing. HENT:      Head: Normocephalic. Jaw: There is normal jaw occlusion. No trismus, tenderness, swelling, pain on movement or malocclusion. Right Ear: Tympanic membrane, ear canal and external ear normal.      Left Ear: Tympanic membrane, ear canal and external ear normal.      Nose: Nose normal.      Mouth/Throat:      Mouth: Mucous membranes are moist.      Dentition: Normal dentition. Pharynx: Oropharynx is clear. No pharyngeal swelling or posterior oropharyngeal erythema. Eyes:      Conjunctiva/sclera: Conjunctivae normal.   Cardiovascular:      Rate and Rhythm: Normal rate and regular rhythm. Pulmonary:      Effort: Pulmonary effort is normal. No accessory muscle usage or respiratory distress. Abdominal:      General: There is no distension. Musculoskeletal:        Arms:       Cervical back: Normal range of motion. Skin:     General: Skin is warm and dry. Neurological:      Mental Status: She is alert and oriented to person, place, and time.          Due to the COVID-19 pandemic, in order to reduce the spread and transmission of the virus, some basic elements of the physical exam have been deferred to reduce direct or close contact with the patient unless they are deemed to be absolutely necessary, regardless of whether the virus is highly suspected or not. Diagnostic Study Results     Labs -     Recent Results (from the past 24 hour(s))   EKG, 12 LEAD, INITIAL    Collection Time: 06/05/21  5:44 PM   Result Value Ref Range    Ventricular Rate 78 BPM    Atrial Rate 78 BPM    P-R Interval 174 ms    QRS Duration 76 ms    Q-T Interval 406 ms    QTC Calculation (Bezet) 462 ms    Calculated P Axis 54 degrees    Calculated R Axis 14 degrees    Calculated T Axis -151 degrees    Diagnosis       Normal sinus rhythm  Septal infarct (cited on or before 18-FEB-2017)  T wave abnormality, consider inferolateral ischemia  When compared with ECG of 18-FEB-2017 19:19,  No significant change was found     CBC WITH AUTOMATED DIFF    Collection Time: 06/05/21  6:34 PM   Result Value Ref Range    WBC 9.6 3.6 - 11.0 K/uL    RBC 2.01 (L) 3.80 - 5.20 M/uL    HGB 6.0 (L) 11.5 - 16.0 g/dL    HCT 18.6 (L) 35.0 - 47.0 %    MCV 92.5 80.0 - 99.0 FL    MCH 29.9 26.0 - 34.0 PG    MCHC 32.3 30.0 - 36.5 g/dL    RDW 17.4 (H) 11.5 - 14.5 %    PLATELET 145 520 - 148 K/uL    MPV 11.7 8.9 - 12.9 FL    NRBC 0.3 (H) 0  WBC    ABSOLUTE NRBC 0.03 (H) 0.00 - 0.01 K/uL    NEUTROPHILS 68 32 - 75 %    LYMPHOCYTES 15 12 - 49 %    MONOCYTES 15 (H) 5 - 13 %    EOSINOPHILS 2 0 - 7 %    BASOPHILS 0 0 - 1 %    IMMATURE GRANULOCYTES 0 0.0 - 0.5 %    ABS. NEUTROPHILS 6.6 1.8 - 8.0 K/UL    ABS. LYMPHOCYTES 1.4 0.8 - 3.5 K/UL    ABS. MONOCYTES 1.4 (H) 0.0 - 1.0 K/UL    ABS. EOSINOPHILS 0.2 0.0 - 0.4 K/UL    ABS. BASOPHILS 0.0 0.0 - 0.1 K/UL    ABS. IMM.  GRANS. 0.0 0.00 - 0.04 K/UL    DF SMEAR SCANNED      RBC COMMENTS ANISOCYTOSIS  1+        RBC COMMENTS TARGET CELLS  PRESENT        RBC COMMENTS SCHISTOCYTES  PRESENT       METABOLIC PANEL, COMPREHENSIVE    Collection Time: 06/05/21  6:34 PM   Result Value Ref Range    Sodium 136 136 - 145 mmol/L    Potassium 4.9 3.5 - 5.1 mmol/L    Chloride 100 97 - 108 mmol/L    CO2 31 21 - 32 mmol/L    Anion gap 5 5 - 15 mmol/L    Glucose 115 (H) 65 - 100 mg/dL    BUN 61 (H) 6 - 20 MG/DL    Creatinine 4.88 (H) 0.55 - 1.02 MG/DL    BUN/Creatinine ratio 13 12 - 20      GFR est AA 11 (L) >60 ml/min/1.73m2    GFR est non-AA 9 (L) >60 ml/min/1.73m2    Calcium 8.5 8.5 - 10.1 MG/DL    Bilirubin, total 0.4 0.2 - 1.0 MG/DL    ALT (SGPT) 23 12 - 78 U/L    AST (SGOT) 30 15 - 37 U/L    Alk. phosphatase 92 45 - 117 U/L    Protein, total 6.3 (L) 6.4 - 8.2 g/dL    Albumin 3.1 (L) 3.5 - 5.0 g/dL    Globulin 3.2 2.0 - 4.0 g/dL    A-G Ratio 1.0 (L) 1.1 - 2.2     POC TROPONIN-I    Collection Time: 06/05/21  6:38 PM   Result Value Ref Range    Troponin-I (POC) 0.05 0.00 - 0.08 ng/mL       Radiologic Studies -   XR CHEST PORT   Final Result   1. Enlarged cardiac silhouette, otherwise no acute disease         CT HEAD WO CONT   Final Result         No change or acute abnormality        CT Results  (Last 48 hours)               06/05/21 1708  CT HEAD WO CONT Final result    Impression:          No change or acute abnormality       Narrative:  EXAM: CT HEAD WO CONT       INDICATION: right jaw pain, history of stroke       COMPARISON: 9/4/2018. CONTRAST: None. TECHNIQUE: Unenhanced CT of the head was performed using 5 mm images. Brain and   bone windows were generated. Coronal and sagittal reformats. CT dose reduction   was achieved through use of a standardized protocol tailored for this   examination and automatic exposure control for dose modulation. FINDINGS:   The ventricles and sulci are normal in size, shape and configuration. .   Encephalomalacia right frontal and anterior parietal lobes. There is no   intracranial hemorrhage, extra-axial collection, or mass effect. The basilar   cisterns are open. No CT evidence of acute infarct. The bone windows demonstrate no abnormalities. The visualized portions of the   paranasal sinuses and mastoid air cells are clear.                CXR Results  (Last 48 hours)               06/05/21 1718  XR CHEST PORT Final result    Impression:  1. Enlarged cardiac silhouette, otherwise no acute disease           Narrative:  INDICATION:  jaw pain        EXAM: Single portable view of chest 1711 . Comparison:11/25/2016       Findings: Cardiac silhouette is enlarged. Pulmonary vasculature is not engorged. There are no focal parenchymal opacities, effusions, or pneumothorax. Medical Decision Making     I reviewed the vital signs, available nursing notes, past medical history, past surgical history, family history and social history. Vital Signs-I have reviewed the vital signs that have been made available during the patient's emergency department visit. The vital signs auto-populated below are obtained mostly by electronic means through monitoring devices that have been downloaded into the patient's chart by the nursing staff. Some vital signs are not downloaded into the chart until after the patient has been discharged and this note has been completed, therefore some vital signs may not be available to the physician for review prior to patient's discharge or admission. The physician has reviewed the patient's triage vital signs, monitored the electronic monitoring devices remotely for any significant vital sign abnormalities, and have reviewed vital signs prior to discharge. Some vital signs reviewed at bedside or remotely utilizing electronic monitoring devices may be different than the vital signs downloaded into the electronic medical record. Some vital signs may be erroneous and inaccurate since they are obtained by electronic monitoring devices, and not all vital signs are verified for accuracy by nursing staff prior to downloading into the patient's chart.   Patient Vitals for the past 24 hrs:   Temp Pulse Resp BP SpO2   06/05/21 1900 -- -- -- (!) 116/50 --   06/05/21 1845 -- -- -- (!) 115/47 --   06/05/21 1745 -- -- -- (!) 124/49 --   06/05/21 1730 -- -- -- (!) 120/52 --   06/05/21 1623 98.4 °F (36.9 °C) 89 17 (!) 126/49 100 %         Records Reviewed: Nursing notes for today's visit have been reviewed. I have also reviewed most recent medical records pertinent to today's complaints, if available in our medical record system. I have also reviewed all labs and imaging results from previous results in comparison to results obtained today. If an EKG was obtained today, it has been compared to previous EKGs, if available. If arriving via EMS, the EMS report has been reviewed if made available to us within the patient's time in the emergency department. Provider Notes (Medical Decision Making):   Patient is presenting with jaw pain on the right side that has been present for couple days. She has no tenderness on exam.  No TMJ. There appears to be no dental problems. She has a history of stroke. My concern was that the jaw pain could be anginal in nature. Her EKG and cardiac troponin is unremarkable. On her CBC she was found to be anemic. This is a new anemia. She is on Aggrenox. She does not complain of any bleeding. I am not sure if the jaw pain correlates with the anemia. It does not appear to be exertional.  I do not know if the jaw pain may be anginal with the low hemoglobin levels. This however is a new anemia so would recommend that she be admitted to the hospital.  I have ordered blood transfusion to be started here in the ER. I have discussed with the patient the rationale for blood transfusion, its benefits in treating or preventing symptomatic anemia which could lead to fatigue, organ damage or death, and its risk which include: mild transfusion reactions, rare risk of blood borne infection, or more serious but rare allergic reactions. I have discussed the alternatives to transfusion, including the risk and consequences of not receiving transfusion. The patient had an opportunity to ask questions and had agreed to proceed with transfusion of packed red blood cells.     ED Course:   Initial assessment performed. The patients presenting problems have been discussed, and they are in agreement with the care plan formulated and outlined with them. I have encouraged them to ask questions as they arise throughout their visit. Orders Placed This Encounter    CT HEAD WO CONT    XR CHEST PORT    CBC WITH AUTOMATED DIFF    COMPREHENSIVE METABOLIC PANEL    ADULT DIET Regular    TRANSFUSE PACKED RBC'S, 1 Units    NOTIFY PROVIDER: SPECIFY Notify provider on pt's arrival to floor ONE TIME STAT    OUT OF BED WITH ASSISTANCE    VITAL SIGNS PER UNIT ROUTINE    FULL CODE    POC TROPONIN-I    POC TROPONIN-I    EKG, 12 LEAD, INITIAL    TYPE & SCREEN    RBC, ALLOCATE, 1 Units Date needed for transfusion: 6/5/2021    0.9% sodium chloride infusion 250 mL    acetaminophen (TYLENOL) tablet 650 mg    ondansetron (ZOFRAN) injection 4 mg    IP CONSULT TO HOSPITALIST       EKG    Date/Time: 6/5/2021 5:44 PM  Performed by: Delon Rodrigues MD  Authorized by: Delon Rodrigues MD     ECG reviewed by ED Physician in the absence of a cardiologist: yes    Rate:     ECG rate:  78    ECG rate assessment: normal    Rhythm:     Rhythm: sinus rhythm    Ectopy:     Ectopy: none    QRS:     QRS axis:  Normal  Conduction:     Conduction: normal    ST segments:     ST segments:  Normal  T waves:     T waves: non-specific            Critical Care Time:   I have spent 35 minutes of critical care time in evaluating and treating this patient. This includes time spent at bedside, time with family and decision makers, documentation, review of labs and imaging, and/or consultation with specialists. It does not include time spent on separately billed procedures. This patient presents with a critical illness or injury that acutely impairs one or more vital organ systems such that there is a high probability of imminent or life threatening deterioration in the patient's condition.   This case involved decision making of high complexity to assess, manipulate, and support vital organ system failure and/or to prevent further life threatening deterioration of the patient's condition. Failure to initiate these interventions on an urgent basis would likely result in sudden, clinically significant or life threatening deterioration in the patient's condition. Abnormal findings supporting critical care: Anemia symptomatic  Interventions to support critical care: Blood transfusion  Failure to intervene may result in: Worsening anemia resulting in respiratory and cardiac failure      Disposition:  Admit      Diagnosis     Clinical Impression:   1. Anemia, unspecified type    2.  Jaw pain

## 2021-06-06 PROBLEM — K92.2 GI BLEED: Status: ACTIVE | Noted: 2021-06-06

## 2021-06-06 LAB
ABO + RH BLD: NORMAL
ALBUMIN SERPL-MCNC: 3 G/DL (ref 3.5–5)
ALBUMIN/GLOB SERPL: 1 {RATIO} (ref 1.1–2.2)
ALP SERPL-CCNC: 92 U/L (ref 45–117)
ALT SERPL-CCNC: 23 U/L (ref 12–78)
ANION GAP SERPL CALC-SCNC: 6 MMOL/L (ref 5–15)
AST SERPL-CCNC: 20 U/L (ref 15–37)
ATRIAL RATE: 78 BPM
BASOPHILS # BLD: 0 K/UL (ref 0–0.1)
BASOPHILS NFR BLD: 0 % (ref 0–1)
BILIRUB SERPL-MCNC: 0.6 MG/DL (ref 0.2–1)
BLD PROD TYP BPU: NORMAL
BLOOD GROUP ANTIBODIES SERPL: NORMAL
BPU ID: NORMAL
BUN SERPL-MCNC: 65 MG/DL (ref 6–20)
BUN/CREAT SERPL: 13 (ref 12–20)
CALCIUM SERPL-MCNC: 8.4 MG/DL (ref 8.5–10.1)
CALCULATED P AXIS, ECG09: 54 DEGREES
CALCULATED R AXIS, ECG10: 14 DEGREES
CALCULATED T AXIS, ECG11: -151 DEGREES
CHLORIDE SERPL-SCNC: 102 MMOL/L (ref 97–108)
CK MB CFR SERPL CALC: NORMAL % (ref 0–2.5)
CK MB SERPL-MCNC: <1 NG/ML (ref 5–25)
CK SERPL-CCNC: 77 U/L (ref 26–192)
CO2 SERPL-SCNC: 30 MMOL/L (ref 21–32)
CREAT SERPL-MCNC: 5.16 MG/DL (ref 0.55–1.02)
CROSSMATCH RESULT,%XM: NORMAL
DIAGNOSIS, 93000: NORMAL
DIFFERENTIAL METHOD BLD: ABNORMAL
EOSINOPHIL # BLD: 0.2 K/UL (ref 0–0.4)
EOSINOPHIL NFR BLD: 2 % (ref 0–7)
ERYTHROCYTE [DISTWIDTH] IN BLOOD BY AUTOMATED COUNT: 17 % (ref 11.5–14.5)
FERRITIN SERPL-MCNC: 815 NG/ML (ref 8–252)
FOLATE SERPL-MCNC: 11.2 NG/ML (ref 5–21)
GLOBULIN SER CALC-MCNC: 3 G/DL (ref 2–4)
GLUCOSE BLD STRIP.AUTO-MCNC: 145 MG/DL (ref 65–117)
GLUCOSE BLD STRIP.AUTO-MCNC: 73 MG/DL (ref 65–117)
GLUCOSE BLD STRIP.AUTO-MCNC: 86 MG/DL (ref 65–117)
GLUCOSE BLD STRIP.AUTO-MCNC: 91 MG/DL (ref 65–117)
GLUCOSE SERPL-MCNC: 74 MG/DL (ref 65–100)
HCT VFR BLD AUTO: 20.8 % (ref 35–47)
HCT VFR BLD AUTO: 21.9 % (ref 35–47)
HGB BLD-MCNC: 6.8 G/DL (ref 11.5–16)
HGB BLD-MCNC: 7 G/DL (ref 11.5–16)
IMM GRANULOCYTES # BLD AUTO: 0.1 K/UL (ref 0–0.04)
IMM GRANULOCYTES NFR BLD AUTO: 1 % (ref 0–0.5)
LDH SERPL L TO P-CCNC: 238 U/L (ref 81–246)
LYMPHOCYTES # BLD: 1.4 K/UL (ref 0.8–3.5)
LYMPHOCYTES NFR BLD: 15 % (ref 12–49)
MCH RBC QN AUTO: 29.4 PG (ref 26–34)
MCHC RBC AUTO-ENTMCNC: 32 G/DL (ref 30–36.5)
MCV RBC AUTO: 92 FL (ref 80–99)
MONOCYTES # BLD: 1.3 K/UL (ref 0–1)
MONOCYTES NFR BLD: 13 % (ref 5–13)
NEUTS SEG # BLD: 6.8 K/UL (ref 1.8–8)
NEUTS SEG NFR BLD: 69 % (ref 32–75)
NRBC # BLD: 0.02 K/UL (ref 0–0.01)
NRBC BLD-RTO: 0.2 PER 100 WBC
P-R INTERVAL, ECG05: 174 MS
PLATELET # BLD AUTO: 251 K/UL (ref 150–400)
PMV BLD AUTO: 11.7 FL (ref 8.9–12.9)
POTASSIUM SERPL-SCNC: 4 MMOL/L (ref 3.5–5.1)
PROT SERPL-MCNC: 6 G/DL (ref 6.4–8.2)
Q-T INTERVAL, ECG07: 406 MS
QRS DURATION, ECG06: 76 MS
QTC CALCULATION (BEZET), ECG08: 462 MS
RBC # BLD AUTO: 2.38 M/UL (ref 3.8–5.2)
RETICS # AUTO: 0.08 M/UL (ref 0.02–0.08)
RETICS/RBC NFR AUTO: 3.5 % (ref 0.7–2.1)
SERVICE CMNT-IMP: ABNORMAL
SERVICE CMNT-IMP: NORMAL
SODIUM SERPL-SCNC: 138 MMOL/L (ref 136–145)
SPECIMEN EXP DATE BLD: NORMAL
STATUS OF UNIT,%ST: NORMAL
TROPONIN I SERPL-MCNC: 0.06 NG/ML
UNIT DIVISION, %UDIV: 0
VENTRICULAR RATE, ECG03: 78 BPM
VIT B12 SERPL-MCNC: >2000 PG/ML (ref 193–986)
WBC # BLD AUTO: 9.8 K/UL (ref 3.6–11)

## 2021-06-06 PROCEDURE — 65270000029 HC RM PRIVATE

## 2021-06-06 PROCEDURE — 74011250636 HC RX REV CODE- 250/636: Performed by: INTERNAL MEDICINE

## 2021-06-06 PROCEDURE — 99218 HC RM OBSERVATION: CPT

## 2021-06-06 PROCEDURE — 82607 VITAMIN B-12: CPT

## 2021-06-06 PROCEDURE — 74011250637 HC RX REV CODE- 250/637: Performed by: INTERNAL MEDICINE

## 2021-06-06 PROCEDURE — 77010033678 HC OXYGEN DAILY

## 2021-06-06 PROCEDURE — 84484 ASSAY OF TROPONIN QUANT: CPT

## 2021-06-06 PROCEDURE — C9113 INJ PANTOPRAZOLE SODIUM, VIA: HCPCS | Performed by: INTERNAL MEDICINE

## 2021-06-06 PROCEDURE — 74011636637 HC RX REV CODE- 636/637: Performed by: INTERNAL MEDICINE

## 2021-06-06 PROCEDURE — 83540 ASSAY OF IRON: CPT

## 2021-06-06 PROCEDURE — 94760 N-INVAS EAR/PLS OXIMETRY 1: CPT

## 2021-06-06 PROCEDURE — 82962 GLUCOSE BLOOD TEST: CPT

## 2021-06-06 PROCEDURE — 82550 ASSAY OF CK (CPK): CPT

## 2021-06-06 PROCEDURE — 82746 ASSAY OF FOLIC ACID SERUM: CPT

## 2021-06-06 PROCEDURE — 80053 COMPREHEN METABOLIC PANEL: CPT

## 2021-06-06 PROCEDURE — 94761 N-INVAS EAR/PLS OXIMETRY MLT: CPT

## 2021-06-06 PROCEDURE — 85045 AUTOMATED RETICULOCYTE COUNT: CPT

## 2021-06-06 PROCEDURE — 74011000250 HC RX REV CODE- 250: Performed by: INTERNAL MEDICINE

## 2021-06-06 PROCEDURE — 85018 HEMOGLOBIN: CPT

## 2021-06-06 PROCEDURE — 85025 COMPLETE CBC W/AUTO DIFF WBC: CPT

## 2021-06-06 PROCEDURE — 82728 ASSAY OF FERRITIN: CPT

## 2021-06-06 PROCEDURE — 36415 COLL VENOUS BLD VENIPUNCTURE: CPT

## 2021-06-06 PROCEDURE — 83615 LACTATE (LD) (LDH) ENZYME: CPT

## 2021-06-06 RX ORDER — DULOXETIN HYDROCHLORIDE 30 MG/1
60 CAPSULE, DELAYED RELEASE ORAL DAILY
Status: DISCONTINUED | OUTPATIENT
Start: 2021-06-06 | End: 2021-06-09 | Stop reason: HOSPADM

## 2021-06-06 RX ORDER — AMLODIPINE BESYLATE 5 MG/1
10 TABLET ORAL DAILY
Status: DISCONTINUED | OUTPATIENT
Start: 2021-06-06 | End: 2021-06-09 | Stop reason: HOSPADM

## 2021-06-06 RX ORDER — MAGNESIUM SULFATE 100 %
4 CRYSTALS MISCELLANEOUS AS NEEDED
Status: DISCONTINUED | OUTPATIENT
Start: 2021-06-06 | End: 2021-06-09 | Stop reason: HOSPADM

## 2021-06-06 RX ORDER — INSULIN LISPRO 100 [IU]/ML
INJECTION, SOLUTION INTRAVENOUS; SUBCUTANEOUS
Status: DISCONTINUED | OUTPATIENT
Start: 2021-06-06 | End: 2021-06-09 | Stop reason: HOSPADM

## 2021-06-06 RX ORDER — ATORVASTATIN CALCIUM 40 MG/1
40 TABLET, FILM COATED ORAL DAILY
Status: DISCONTINUED | OUTPATIENT
Start: 2021-06-06 | End: 2021-06-09 | Stop reason: HOSPADM

## 2021-06-06 RX ORDER — LANOLIN ALCOHOL/MO/W.PET/CERES
1000 CREAM (GRAM) TOPICAL DAILY
Status: DISCONTINUED | OUTPATIENT
Start: 2021-06-06 | End: 2021-06-09 | Stop reason: HOSPADM

## 2021-06-06 RX ORDER — INSULIN GLARGINE 100 [IU]/ML
20 INJECTION, SOLUTION SUBCUTANEOUS DAILY
Status: DISCONTINUED | OUTPATIENT
Start: 2021-06-06 | End: 2021-06-06

## 2021-06-06 RX ORDER — INSULIN GLARGINE 100 [IU]/ML
10 INJECTION, SOLUTION SUBCUTANEOUS DAILY
Status: DISCONTINUED | OUTPATIENT
Start: 2021-06-06 | End: 2021-06-06

## 2021-06-06 RX ORDER — CLONIDINE HYDROCHLORIDE 0.1 MG/1
0.1 TABLET ORAL 2 TIMES DAILY
Status: DISCONTINUED | OUTPATIENT
Start: 2021-06-06 | End: 2021-06-09 | Stop reason: HOSPADM

## 2021-06-06 RX ORDER — ALPRAZOLAM 0.25 MG/1
0.25 TABLET ORAL
Status: DISCONTINUED | OUTPATIENT
Start: 2021-06-06 | End: 2021-06-09 | Stop reason: HOSPADM

## 2021-06-06 RX ORDER — HEPARIN SODIUM 5000 [USP'U]/ML
5000 INJECTION, SOLUTION INTRAVENOUS; SUBCUTANEOUS EVERY 8 HOURS
Status: DISCONTINUED | OUTPATIENT
Start: 2021-06-07 | End: 2021-06-09 | Stop reason: HOSPADM

## 2021-06-06 RX ORDER — DEXTROSE 50 % IN WATER (D50W) INTRAVENOUS SYRINGE
25-50 AS NEEDED
Status: DISCONTINUED | OUTPATIENT
Start: 2021-06-06 | End: 2021-06-09 | Stop reason: HOSPADM

## 2021-06-06 RX ORDER — INSULIN GLARGINE 100 [IU]/ML
10 INJECTION, SOLUTION SUBCUTANEOUS DAILY
Status: DISCONTINUED | OUTPATIENT
Start: 2021-06-07 | End: 2021-06-09 | Stop reason: HOSPADM

## 2021-06-06 RX ORDER — ASPIRIN AND DIPYRIDAMOLE 25; 200 MG/1; MG/1
1 CAPSULE, EXTENDED RELEASE ORAL 2 TIMES DAILY
Status: DISCONTINUED | OUTPATIENT
Start: 2021-06-06 | End: 2021-06-09 | Stop reason: HOSPADM

## 2021-06-06 RX ORDER — SEVELAMER CARBONATE 800 MG/1
1600 TABLET, FILM COATED ORAL
Status: DISCONTINUED | OUTPATIENT
Start: 2021-06-07 | End: 2021-06-09 | Stop reason: HOSPADM

## 2021-06-06 RX ADMIN — DULOXETINE HYDROCHLORIDE 60 MG: 30 CAPSULE, DELAYED RELEASE ORAL at 09:20

## 2021-06-06 RX ADMIN — POLYETHYLENE GLYCOL 3350 17 G: 17 POWDER, FOR SOLUTION ORAL at 10:00

## 2021-06-06 RX ADMIN — CYANOCOBALAMIN TAB 500 MCG 1000 MCG: 500 TAB at 09:20

## 2021-06-06 RX ADMIN — AMLODIPINE BESYLATE 10 MG: 5 TABLET ORAL at 09:20

## 2021-06-06 RX ADMIN — CLONIDINE HYDROCHLORIDE 0.1 MG: 0.1 TABLET ORAL at 09:20

## 2021-06-06 RX ADMIN — INSULIN GLARGINE 20 UNITS: 100 INJECTION, SOLUTION SUBCUTANEOUS at 09:19

## 2021-06-06 RX ADMIN — Medication 10 ML: at 21:43

## 2021-06-06 RX ADMIN — CLONIDINE HYDROCHLORIDE 0.1 MG: 0.1 TABLET ORAL at 17:37

## 2021-06-06 RX ADMIN — Medication 10 ML: at 07:00

## 2021-06-06 RX ADMIN — Medication 10 ML: at 13:18

## 2021-06-06 RX ADMIN — SODIUM CHLORIDE 40 MG: 9 INJECTION, SOLUTION INTRAMUSCULAR; INTRAVENOUS; SUBCUTANEOUS at 09:20

## 2021-06-06 RX ADMIN — Medication 10 ML: at 13:17

## 2021-06-06 RX ADMIN — ATORVASTATIN CALCIUM 40 MG: 40 TABLET, FILM COATED ORAL at 09:20

## 2021-06-06 RX ADMIN — SODIUM CHLORIDE 40 MG: 9 INJECTION, SOLUTION INTRAMUSCULAR; INTRAVENOUS; SUBCUTANEOUS at 21:42

## 2021-06-06 NOTE — CONSULTS
Mitch Jaramillo  A   YFD:[birth year]                      [facility]    Plan for hd tomorrow. Patient Active Problem List    Diagnosis Date Noted    Acute CVA (cerebrovascular accident) ([address] 2016    History of CVA (cerebrovascular accident) 2016    Diabetes ([address] 2016    Hypertension 2016    Arthritis 2016    Ocular proptosis 2016    Chronic renal failure, stage 4 (severe) ([address] 2016    Anemia in chronic kidney disease 2016    Hyperlipidemia 2016    GI bleed 2021    Anemia 2021    Hypokalemia 2019    BASILIA (acute kidney injury) ([address] 2019    Bilateral carotid artery stenosis 2018    Weakness of left leg 2018    Right leg DVT ([address] 2017    Stenosis of both internal carotid arteries 2016    Cerebral microvascular disease 2016    Obesity 2016            Lalita Lizarraga MD  [facility]  [facility]  [facility] [address]  [place], [address]  Phone - [phone]         Fax - [phone] [facility] Office  [address] [place] [address]  Phone - [phone]        Fax - [phone]     [email or web address]

## 2021-06-06 NOTE — PROGRESS NOTES
Hospitalist Progress Note    NAME: Yasmeen Tee   :  1945   MRN:  [id number]       Assessment / Plan:  Acute on chronic anemia likely due to renal failure and GI bleed  Hgb 9.0 in 10/2020, presenting with hgb 6, now s/p 1 unit prbc transfusion  Follow hgb, transfuse for hgb<7. Iron panel ordered but pt already received  PRBC transfusion. Check b12, folate  FOBT positive. Pt denies melena  Will start IV PPI  Pt on aggranox PTA for CVA prevention, currently on hold  Will ask for GI consult. Keep NPO for possible EGD    Right jaw pain etiology unclear POA, resolved  Cardiac enzymes negative. EKG with inferolateral T wave inversion, but this is unchanged from prior EKGs    ESRD  Consult nephro for HD, MWF    Diabetes mellitus type 2 on insulin, nephropathy POA  Hypoglycemic this AM.  Will reduce lantus  Cont' SSI    Essential hypertension POA  History of stroke  Coronary artery disease POA in record, patient denies this  Hyperlipidemia POA  Hold Aggrenox  Cont' Norvasc, clonidine, Lipitor  EKG with inferolateral TWI, this is unchanged from several years ago     Anxiety depression  Continue Xanax as needed, Cymbalta    Overweight by definition Body mass index is 27.78 kg/m². Estimated discharge date:   Barriers: Gi evaluation, pt's condition    Code status: Full  Prophylaxis: SCD's  Recommended Disposition: Home w/Family     Subjective:     Chief Complaint / Reason for Physician Visit  Pt awake, NAD. Denies any dizziness, cp, sob. Jaw pain resolved. Discussed with RN events overnight. Review of Systems:  Symptom Y/N Comments  Symptom Y/N Comments   Fever/Chills    Chest Pain     Poor Appetite    Edema     Cough    Abdominal Pain     Sputum    Joint Pain     SOB/KWON    Pruritis/Rash     Nausea/vomit    Tolerating PT/OT     Diarrhea    Tolerating Diet     Constipation    Other       Could NOT obtain due to:      Objective:     VITALS:   Last 24hrs VS reviewed since prior progress note. Most recent are:  Patient Vitals for the past 24 hrs:   Temp Pulse Resp BP SpO2   06/06/21 0830 98.3 °F (36.8 °C) 92 20 (!) 139/59 100 %   06/06/21 0137 98.6 °F (37 °C) 72 20 124/75 98 %   06/05/21 2345 98.5 °F (36.9 °C) 74 20 123/77 100 %   06/05/21 2200 98.2 °F (36.8 °C) -- 20 (!) 150/55 100 %   06/05/21 2145 98.3 °F (36.8 °C) -- 19 (!) 143/58 100 %   06/05/21 2130 98.5 °F (36.9 °C) 77 16 (!) 139/52 100 %   06/05/21 2115 -- -- -- (!) 131/54 100 %   06/05/21 2109 98.1 °F (36.7 °C) 76 19 (!) 128/51 100 %   06/05/21 2030 -- -- -- (!) 134/53 100 %   06/05/21 1930 -- -- -- (!) 120/50 100 %   06/05/21 1900 98.2 °F (36.8 °C) 76 18 (!) 116/50 100 %   06/05/21 1845 -- -- -- (!) 115/47 --   06/05/21 1745 -- -- -- (!) 124/49 --   06/05/21 1730 -- -- -- (!) 120/52 --   06/05/21 1623 98.4 °F (36.9 °C) 89 17 (!) 126/49 100 %       Intake/Output Summary (Last 24 hours) at 6/6/2021 0932  Last data filed at 6/6/2021 0913  Gross per 24 hour   Intake 823.3 ml   Output --   Net 823.3 ml        I had a face to face encounter and independently examined this patient on 6/6/2021, as outlined below:  PHYSICAL EXAM:  General: WD, WN. Alert, cooperative, no acute distress    EENT:  EOMI. Anicteric sclerae. MMM  Resp:  CTA bilaterally, no wheezing or rales. No accessory muscle use  CV:  Regular  rhythm,  No edema  GI:  Soft, Non distended, Non tender. +Bowel sounds  Neurologic:  Alert and oriented X 3, normal speech,  Psych:   Good insight. Not anxious nor agitated  Skin:  No rashes.   No jaundice    Reviewed most current lab test results and cultures  YES  Reviewed most current radiology test results   YES  Review and summation of old records today    NO  Reviewed patient's current orders and MAR    YES  PMH/SH reviewed - no change compared to H&P  ________________________________________________________________________  Care Plan discussed with:    Comments   Patient x    Family      RN x    Care Manager     Consultant Multidiciplinary team rounds were held today with , nursing, pharmacist and clinical coordinator. Patient's plan of care was discussed; medications were reviewed and discharge planning was addressed. ________________________________________________________________________  Total NON critical care TIME:  35   Minutes    Total CRITICAL CARE TIME Spent:   Minutes non procedure based      Comments   >50% of visit spent in counseling and coordination of care     ________________________________________________________________________  Sidra Escobar MD     Procedures: see electronic medical records for all procedures/Xrays and details which were not copied into this note but were reviewed prior to creation of Plan. LABS:  I reviewed today's most current labs and imaging studies.   Pertinent labs include:  Recent Labs     06/06/21  0309 06/05/21  1834   WBC 9.8 9.6   HGB 7.0* 6.0*   HCT 21.9* 18.6*    254     Recent Labs     06/06/21  0309 06/05/21  1834    136   K 4.0 4.9    100   CO2 30 31   GLU 74 115*   BUN 65* 61*   CREA 5.16* 4.88*   CA 8.4* 8.5   ALB 3.0* 3.1*   TBILI 0.6 0.4   ALT 23 23       Signed: Sidra Escobar MD

## 2021-06-06 NOTE — H&P
Hospitalist Admission Note    NAME:  Derek Omer   :   1945   MRN:   [id number]     Date of admit: 2021    PCP: Keeley Martin MD    Assessment/Plan:     Anemia likely chronic disease of renal failure POA  Hemoglobin 6.0 with normal MCV 92.5  Last hemoglobin 9.7 in   Has required transfusions in the past  No melena, bright red blood per rectum, hematemesis, but stool is heme positive  Check iron studies, hemolysis labs, ferritin  Transfuse 1 unit packed red blood cells, recheck hemoglobin transfuse further as needed  Possible discharge tomorrow if hemoglobin stable and asymptomatic    Right jaw pain etiology unclear POA  Intermittent pain x2 days, points to the right TMJ  No clear exacerbating factors, not changing with opening mouth  No chest pain or shortness of breath  EKG with inferolateral T wave inversion, but this is unchanged from prior EKGs  Initial cardiac enzymes negative  Transfused, will repeat hemoglobin and troponin in a.m. End-stage renal disease on hemodialysis since   Consult Dr. Jeremie Urias in a.m. Diabetes mellitus type 2 on insulin, nephropathy POA  Baseline takes insulin 75/25 20 units twice a day  Transition to Lantus in a.m. 20 units with sliding scale insulin  Resume home insulin at discharge  Sliding scale insulin  Check blood sugars  Hold Actos    Essential hypertension POA  History of stroke  Coronary artery disease POA in record, patient denies this  Hyperlipidemia POA  Continue Aggrenox, Norvasc, clonidine, Lipitor  EKG with inferolateral TWI, this is unchanged from several years ago  Serial cardiac enzymes  Unclear that the jaw pain is an anginal equivalent    Anxiety depression  Continue Xanax as needed, Cymbalta    Overweight  POA Body mass index is 27.78 kg/m². Given the patient's current clinical presentation, I have a high level of concern for decompensation if discharged from the emergency department.   My assessment of this patient's clinical condition and my plan of care is as noted above.     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 Georgette Drew, now sees Dr. Syed Eduardo  History of anemia in past, has required transfusions  Last colonoscopy February 2018 by Dr. Babita Hurtado    Past 2 days intermittent aching pain in her right jaw  No trauma to the area or dental problems  Not changing with opening her mouth  Pointed to her right TMJ area where the pain was  She could not identify any exacerbating or alleviating factors  Currently she is pain-free  She was worried about a possible stroke with the jaw discomfort  No cough, shortness of breath, fevers, nausea vomiting, diarrhea  No chest pain, abdominal pain  No hematemesis, melena, bright red blood per rectum  Came to the emergency room for evaluation    Emergency room  Neuro exam nonfocal  Head CT scan negative  EKG with inferolateral T wave inversion which is old  Troponin  0.05  Hemoglobin 6.0   Stool heme positive  We were called to admit the patient for transfusion and work-up      Past Medical History:   Diagnosis Date    Arthritis     CAD (coronary artery disease)     Diabetes ([facility] [address]     Hypertension     Stroke [facility])         Past Surgical History:   Procedure Laterality Date    COLONOSCOPY N/A 2/16/2018    COLON[facility]dom [facility] at [facility] ENDOSCOPY    HX GYN      HX ORTHOPAEDIC      right knee replacement       Social History     Tobacco Use    Smoking status: Never Smoker    Smokeless tobacco: Never Used   Substance Use Topics    Alcohol use: Yes     Comment: rarely         Family history:  No children of own  Brother, Mother, Father had diabetes  Father  of MI    No Known Allergies     Prior to Admission medications    Medication Sig Start Date End Date Taking? Authorizing Provider   cyanocobalamin (Vitamin B-12) 1,000 mcg tablet Take 1,000 mcg by mouth daily. Yes Freddy, MD Sai   vitamin E (AQUA GEMS) 400 unit capsule Take  by mouth daily. Yes Sai Hayes MD   ALPRAZolam (XANAX) 0.25 mg tablet Take 0.25 mg by mouth as needed for Anxiety. Yes Freddy, MD Sai   HYDROcodone-acetaminophen (NORCO) 7.5-325 mg per tablet Take  by mouth as needed for Pain. Yes Sai Hayes MD   ezetimibe (ZETIA) 10 mg tablet Take 10 mg by mouth daily. Yes Sai Hayes MD   amLODIPine (NORVASC) 10 mg tablet Take 10 mg by mouth daily. Yes Sai Hayes MD   cyanocobalamin 1,000 mcg tablet Take 1,000 mcg by mouth daily. Yes Freddy, MD Sai   aspirin-dipyridamole (AGGRENOX)  mg per SR capsule Take 1 Cap by mouth two (2) times a day. Yes Freddy, MD Sai   insulin lispro protamine/insulin lispro (HUMALOG MIX 75-25) 100 unit/mL (75-25) injection 28 Units by SubCUTAneous route two (2) times a day. Yes Sai Hayes MD   DULoxetine (CYMBALTA) 60 mg capsule Take 60 mg by mouth daily. Yes Sai Hayes MD   atorvastatin (LIPITOR) 40 mg tablet Take 40 mg by mouth daily. 10/24/14  Yes Sai Hayes MD   torsemide (DEMADEX) 100 mg tablet Take 1 Tab by mouth daily. Hold until seen by Sarthak Kaplan  Patient taking differently: Take 1 Tab by mouth daily. instructed to take 1/2 tab MWF 19   Gladystine MD Ronen   ferrous sulfate 325 mg (65 mg iron) tablet Take 325 mg by mouth two (2) times a day. Sai Hayes MD   pioglitazone (ACTOS) 15 mg tablet Take 15 mg by mouth daily. Sai Hayes MD   cloNIDine HCl (CATAPRES) 0.1 mg tablet Take 0.1 mg by mouth two (2) times a day.     Sai Hayes MD FOLIC ACID/MV,FE,OTHER MIN (CENTRUM COMPLETE PO) Take 1 Tab by mouth daily.     Other, MD Sai       Review of symptoms:     POSITIVE= Bold  Negative = not bold  General:  fever, chills, sweats, generalized weakness after HD  Eyes:    blurred vision, eye pain, double vision  ENT:    Coryza, sore throat, trouble swallowing, right jaw pain  Respiratory:   cough, sputum, SOB  Cardiology:   chest pain, orthopnea, PND, edema  Gastrointestinal:  abdominal pain , N/V, diarrhea, constipation, melena or BRBPR  Genitourinary:  Urgency, dysuria, hematuria  Muskuloskeletal :  Joint redness, swelling or acute joint pain, myalgias  Hematology:  easy bruising, nose or gum bleeding  Dermatological: rash, ulceration  Endocrine:   Polyuria or polydipsia, heat or hold intolerance  Neurological:  Headache, focal motor or sensory changes     Speech difficulties, memory loss  Psychological: depression, agitation      Objective:   VITALS:    Patient Vitals for the past 24 hrs:   Temp Pulse Resp BP SpO2   21 2200 98.2 °F (36.8 °C) -- 20 (!) 150/55 100 %   21 2145 98.3 °F (36.8 °C) -- 19 (!) 143/58 100 %   21 2130 98.5 °F (36.9 °C) 77 16 (!) 139/52 100 %   21 2115 -- -- -- (!) 131/54 100 %   21 2109 98.1 °F (36.7 °C) 76 19 (!) 128/51 100 %   21 -- -- -- (!) 134/53 100 %   21 1930 -- -- -- (!) 120/50 100 %   21 1900 98.2 °F (36.8 °C) 76 18 (!) 116/50 100 %   21 1845 -- -- -- (!) 115/47 --   21 1745 -- -- -- (!) 124/49 --   21 1730 -- -- -- (!) 120/52 --   21 1623 98.4 °F (36.9 °C) 89 17 (!) 126/49 100 %     Temp (24hrs), Av.3 °F (36.8 °C), Min:98.1 °F (36.7 °C), Max:98.5 °F (36.9 °C)      O2 Device: None (Room air)    Wt Readings from Last 12 Encounters:   21 66.7 kg (147 lb)   21 64.4 kg (142 lb)   01/15/20 74.4 kg (164 lb)   20 74.8 kg (165 lb)   19 74.8 kg (165 lb)   19 74.8 kg (165 lb)   19 74.4 kg (164 lb)   19 74.4 kg (164 lb)   12/06/19 74.4 kg (164 lb)   12/05/19 75.3 kg (166 lb)   12/02/19 75.8 kg (167 lb)   11/25/19 73 kg (161 lb)         PHYSICAL EXAM:     I had a face to face encounter and independently examined this patient on 06/05/21  as outlined below:    General:    Alert, cooperative in no distress     HEENT: Normocephalic, atraumatic    PERRL, Sclera no icterus    Nasal mucosa without masses or discharge  Hearing intact to voice    Oropharynx without erythema or exudate  No thrush  Pale MM  Neck:  No meningismus, trachea midline, no carotid bruits     Thyroid not enlarged, no nodules or tenderness  Lungs:   Clear to auscultation bilaterally. No wheezing or rales    No accessory muscle use or retractions. Heart:   Regular rate and rhythm,  Grade 2/6 SM at LSB, no gallop. No LE edema  Abdomen:   Soft, non-tender. Not distended. Bowel sounds normal.     No masses, No Hepatosplenomegaly, No Rebound or guarding  Lymph nodes: No cervical or inguinal NITZA  Musculoskeletal:  No Joint swelling, erythema, warmth.  No Cyanosis or clubbing  Skin:      No rashes       Not Jaundiced   No nodules or thickening  Neurologic: Alert, follows commands     Cranial nerves 2 to 12 intact    Symmetric motor strength bilaterally       LAB DATA REVIEWED:    Recent Results (from the past 12 hour(s))   EKG, 12 LEAD, INITIAL    Collection Time: 06/05/21  5:44 PM   Result Value Ref Range    Ventricular Rate 78 BPM    Atrial Rate 78 BPM    P-R Interval 174 ms    QRS Duration 76 ms    Q-T Interval 406 ms    QTC Calculation (Bezet) 462 ms    Calculated P Axis 54 degrees    Calculated R Axis 14 degrees    Calculated T Axis -151 degrees    Diagnosis       Normal sinus rhythm  Septal infarct (cited on or before 18-FEB-2017)  T wave abnormality, consider inferolateral ischemia  When compared with ECG of 18-FEB-2017 19:19,  No significant change was found     CBC WITH AUTOMATED DIFF    Collection Time: 06/05/21  6:34 PM   Result Value Ref Range    WBC 9.6 3.6 - 11.0 K/uL    RBC 2.01 (L) 3.80 - 5.20 M/uL    HGB 6.0 (L) 11.5 - 16.0 g/dL    HCT 18.6 (L) 35.0 - 47.0 %    MCV 92.5 80.0 - 99.0 FL    MCH 29.9 26.0 - 34.0 PG    MCHC 32.3 30.0 - 36.5 g/dL    RDW 17.4 (H) 11.5 - 14.5 %    PLATELET 892 690 - 124 K/uL    MPV 11.7 8.9 - 12.9 FL    NRBC 0.3 (H) 0  WBC    ABSOLUTE NRBC 0.03 (H) 0.00 - 0.01 K/uL    NEUTROPHILS 68 32 - 75 %    LYMPHOCYTES 15 12 - 49 %    MONOCYTES 15 (H) 5 - 13 %    EOSINOPHILS 2 0 - 7 %    BASOPHILS 0 0 - 1 %    IMMATURE GRANULOCYTES 0 0.0 - 0.5 %    ABS. NEUTROPHILS 6.6 1.8 - 8.0 K/UL    ABS. LYMPHOCYTES 1.4 0.8 - 3.5 K/UL    ABS. MONOCYTES 1.4 (H) 0.0 - 1.0 K/UL    ABS. EOSINOPHILS 0.2 0.0 - 0.4 K/UL    ABS. BASOPHILS 0.0 0.0 - 0.1 K/UL    ABS. IMM. GRANS. 0.0 0.00 - 0.04 K/UL    DF SMEAR SCANNED      RBC COMMENTS ANISOCYTOSIS  1+        RBC COMMENTS TARGET CELLS  PRESENT        RBC COMMENTS SCHISTOCYTES  PRESENT       METABOLIC PANEL, COMPREHENSIVE    Collection Time: 06/05/21  6:34 PM   Result Value Ref Range    Sodium 136 136 - 145 mmol/L    Potassium 4.9 3.5 - 5.1 mmol/L    Chloride 100 97 - 108 mmol/L    CO2 31 21 - 32 mmol/L    Anion gap 5 5 - 15 mmol/L    Glucose 115 (H) 65 - 100 mg/dL    BUN 61 (H) 6 - 20 MG/DL    Creatinine 4.88 (H) 0.55 - 1.02 MG/DL    BUN/Creatinine ratio 13 12 - 20      GFR est AA 11 (L) >60 ml/min/1.73m2    GFR est non-AA 9 (L) >60 ml/min/1.73m2    Calcium 8.5 8.5 - 10.1 MG/DL    Bilirubin, total 0.4 0.2 - 1.0 MG/DL    ALT (SGPT) 23 12 - 78 U/L    AST (SGOT) 30 15 - 37 U/L    Alk. phosphatase 92 45 - 117 U/L    Protein, total 6.3 (L) 6.4 - 8.2 g/dL    Albumin 3.1 (L) 3.5 - 5.0 g/dL    Globulin 3.2 2.0 - 4.0 g/dL    A-G Ratio 1.0 (L) 1.1 - 2.2     POC TROPONIN-I    Collection Time: 06/05/21  6:38 PM   Result Value Ref Range    Troponin-I (POC) 0.05 0.00 - 0.08 ng/mL   RBC, ALLOCATE    Collection Time: 06/05/21  7:00 PM   Result Value Ref Range    HISTORY CHECKED?  Historical check performed TYPE & SCREEN    Collection Time: 06/05/21  7:23 PM   Result Value Ref Range    Crossmatch Expiration 06/08/2021,2359     ABO/Rh(D) B POSITIVE     Antibody screen NEG     Unit number [id number]     Blood component type RC LR     Unit division 00     Status of unit ISSUED     Crossmatch result Compatible    OCCULT BLOOD, STOOL    Collection Time: 06/05/21  8:31 PM   Result Value Ref Range    Occult blood, stool Positive (A) NEG         EKG as read by me shows NSR rate 78, normal axis, no LVH  Normal intervals, septal Q-waves, inferolateral TWI    CXR read by radiology and reviewed by myself results:  Cardiac silhouette is enlarged. Pulmonary vasculature is not engorged. There are no focal parenchymal opacities, effusions, or pneumothorax. IMPRESSION  1. Enlarged cardiac silhouette, otherwise no acute disease    CT scan head read by radiology FINDINGS:  The ventricles and sulci are normal in size, shape and configuration. .  Encephalomalacia right frontal and anterior parietal lobes. There is no  intracranial hemorrhage, extra-axial collection, or mass effect. The basilar  cisterns are open. No CT evidence of acute infarct. The bone windows demonstrate no abnormalities. The visualized portions of the  paranasal sinuses and mastoid air cells are clear. IMPRESSION  No change or acute abnormality      I saw the patient personally, took a history and did a complete physical exam at the bedside. I performed complex decision making in coming up with a diagnostic and treatment plan for the patient. I reviewed the patient's past medical records, current laboratory and radiology results, and actual Xray films/EKG. I have also discussed this case with the involved ED physician.     Care Plan discussed with:    Patient, Family, ED Doc    Risk of deterioration:  High    Total Time Coordinating Admission: 65    minutes    Total Critical Care Time:         Gustavo Solitario MD

## 2021-06-06 NOTE — PROGRESS NOTES
0700 - Assumed care of patient. Patient resting comfortably, denies further needs. 0800 - Educated patient on GI bleeds, plan of care.  at bedside. 1000 - Consult called to Dr. Melvi Huerta (GI) and Dr. Muna Ross (nephrology). Both verbalize they will come see her today. Per Dr. Sharonda Simpson, [facility] give patient clear liquid diet right now. 1400 - MD Francine at bedside. See note. Possible EGD tomorrow. Hemoglobin back at 6.8. MD Sid Singleton aware. Plan to transfuse one unit PRBS during dialysis tomorrow. 1500 - Patient up to bedside commode. Strong but a little wobbly. OK to transfer one person. 1600 - Patient complaining of pain in right jaw, 4/10. Pain disappeared after 15 minutes. 1800 - Patient complains of sensitivity on right calf. Repositioned leg, patient says it feels better. 1900 - Report given to oncoming PM RN. Reviewed patient assessment, goals of care, discharge planning. No further questions. Patient in bed, comfortable, call bell within reach. Problem: Falls - Risk of  Goal: *Absence of Falls  Description: Document Nanci Condon Fall Risk and appropriate interventions in the flowsheet.   Outcome: Progressing Towards Goal  Note: Fall Risk Interventions:

## 2021-06-06 NOTE — ED NOTES
Patient is being transferred to [facility] General Surgery, Room # 2142. Report given to BALDO Lopez on Purnima Carrasco for routine progression of care. Report consisted of the following information ED Summary. Patient transferred to receiving unit by: Bianca Mims (RN or tech name).     Outstanding consults needed: Yes    Next labs due: No     The following personal items will be sent with the patient during transfer to the floor: purse    All valuables: with spouse    Cardiac monitoring ordered: No     The following CURRENT information was reported to the receiving RN:    Code status: Full Code at time of transfer    Last set of vital signs:  Vital Signs  Level of Consciousness: Alert (0) (06/05/21 2200)  Temp: 98.2 °F (36.8 °C) (06/05/21 2200)  Temp Source: Oral (06/05/21 2200)  Pulse (Heart Rate): 77 (06/05/21 2130)  Heart Rate Source: Monitor (06/05/21 2200)  Resp Rate: 20 (06/05/21 2200)  BP: (!) 150/55 (06/05/21 2200)  MAP (Monitor): 78 (06/05/21 2200)  MAP (Calculated): 87 (06/05/21 2200)  BP 1 Location: Right lower arm (06/05/21 2200)  BP 1 Method: Automatic (06/05/21 2200)  BP Patient Position: At rest (06/05/21 2200)  MEWS Score: 1 (06/05/21 2130)         Oxygen Therapy  O2 Sat (%): 100 % (06/05/21 2200)  Pulse via Oximetry: 85 beats per minute (06/05/21 2200)  O2 Device: None (Room air) (06/05/21 2200)      Last pain assessment:  Pain 1  Pain Scale 1: Numeric (0 - 10)  Pain Intensity 1: 0      Wounds: No     Urinary catheter: voiding  Is there a scott order: No     LDAs:       Peripheral IV 06/05/21 Right Forearm (Active)   Site Assessment Clean, dry, & intact 06/05/21 1839   Phlebitis Assessment 0 06/05/21 1839   Infiltration Assessment 0 06/05/21 1839   Dressing Status Clean, dry, & intact 06/05/21 1839   Dressing Type Transparent 06/05/21 1839   Hub Color/Line Status Blue;Flushed 06/05/21 1839   Action Taken Blood drawn 06/05/21 1839       Peripheral IV 91/44/49 Right Basilic (Active)         Opportunity for questions and clarification was provided.     Chapito Mike

## 2021-06-06 NOTE — PROGRESS NOTES
2330--bedside report received from hilario corona, rn, pt, arrived to room 2142, wit PRBCs running,  alert oriented x 4, pt able to stand and take a couple of steps to bed, pt denies pain, nausea, SOB, pt reports using wheelchair sometimes at at home does not walk \" too much\" at home    2345--dr ricks at bedside    0000--pt reports using \"breathing machine\" at home placed on 2L 02    0200--pt voided 250ml, call bell in reach    0500--HGB 7.0    0700--bedside report given to neli Lyons who is assuming care of pt

## 2021-06-06 NOTE — PROGRESS NOTES
Nephrology Progress Note  Mitch Eddy     [facility]  Phone - [phone]   Patient: Ash Abdullahi    YOB: 1945        Date- 6/6/2021   Admit Date: 6/5/2021  CC: Follow up for ESRD          IMPRESSION & PLAN:     ESRD- [facility] - [facility]   Anemia of ckd   Hypertension   GI bleed   Sec. hyperpara   H/o prolonged bleeding at hd access site-    PLAN-   No hd indicated today   Give 1 unit prbc with hd tomorrow   Continue norvasc and clonidine   epogen for anemia   Give venofer tomorrow     Subjective: Interval History:   -  Patient has pmh of esrd, htn  Her last hd on Friday at [facility]    No c/o sob,  No c/o chest pain,   No c/o nausea or vomiting, No c/o  fever. C/O JAW PAIN    Objective:   Vitals:    06/06/21 0137 06/06/21 0830 06/06/21 1200 06/06/21 1626   BP: 124/75 (!) 139/59 135/60 (!) 135/54   Pulse: 72 92 90 69   Resp: 20 20 18 18   Temp: 98.6 °F (37 °C) 98.3 °F (36.8 °C) 98.3 °F (36.8 °C) 98.4 °F (36.9 °C)   SpO2: 98% 100% 100% 100%   Weight:       Height:          06/05 0701 - 06/06 0700  In: 823.3 [P.O.:300]  Out: -   Last 3 Recorded Weights in this Encounter    06/05/21 1623   Weight: 66.7 kg (147 lb)      Physical exam:   GEN: NAD  NECK- Supple, no mass  RESP: No wheezing, Clear b/l  CVS: S1,S2  RRR  NEURO: Normal speech, Non focal  EXT: trace Edema   PSYCH: Normal Mood  Left arm av graft bruit +    Chart reviewed. Pertinent Notes reviewed.      Data Review :  Recent Labs     06/06/21  0309 06/05/21  1834    136   K 4.0 4.9    100   CO2 30 31   BUN 65* 61*   CREA 5.16* 4.88*   GLU 74 115*   CA 8.4* 8.5     Recent Labs     06/06/21  1330 06/06/21  0309 06/05/21  1834   WBC  --  9.8 9.6   HGB 6.8* 7.0* 6.0*   HCT 20.8* 21.9* 18.6*   PLT  --  251 254     Recent Labs     06/06/21  0309   FERR 815*      Medication list  reviewed  Current Facility-Administered Medications   Medication Dose Route Frequency    glucose chewable tablet 16 g  4 Tablet Oral PRN    dextrose (D50W) injection syrg 12.5-25 g  25-50 mL IntraVENous PRN    glucagon (GLUCAGEN) injection 1 mg  1 mg IntraMUSCular PRN    insulin lispro (HUMALOG) injection   SubCUTAneous AC&HS    [Held by provider] heparin (porcine) injection 5,000 Units  5,000 Units SubCUTAneous Q8H    ALPRAZolam (XANAX) tablet 0.25 mg  0.25 mg Oral QID PRN    amLODIPine (NORVASC) tablet 10 mg  10 mg Oral DAILY    [Held by provider] aspirin-dipyridamole (AGGRENOX)  mg per capsule 1 Capsule  1 Capsule Oral BID    atorvastatin (LIPITOR) tablet 40 mg  40 mg Oral DAILY    cloNIDine HCL (CATAPRES) tablet 0.1 mg  0.1 mg Oral BID    DULoxetine (CYMBALTA) capsule 60 mg  60 mg Oral DAILY    cyanocobalamin (VITAMIN B12) tablet 1,000 mcg  1,000 mcg Oral DAILY    pantoprazole (PROTONIX) 40 mg in 0.9% sodium chloride 10 mL injection  40 mg IntraVENous Q12H    [START ON 6/7/2021] insulin glargine (LANTUS) injection 10 Units  10 Units SubCUTAneous DAILY    [START ON 6/7/2021] sevelamer carbonate (RENVELA) tab 1,600 mg  1,600 mg Oral TID WITH MEALS    [START ON 6/7/2021] epoetin lucina-epbx (RETACRIT) injection 20,000 Units  20,000 Units SubCUTAneous Q MON, WED & FRI    [START ON 6/7/2021] iron sucrose (VENOFER) injection 200 mg  200 mg IntraVENous ONCE    0.9% sodium chloride infusion 250 mL  250 mL IntraVENous PRN    sodium chloride (NS) flush 5-40 mL  5-40 mL IntraVENous Q8H    sodium chloride (NS) flush 5-40 mL  5-40 mL IntraVENous PRN    acetaminophen (TYLENOL) tablet 650 mg  650 mg Oral Q6H PRN    Or    acetaminophen (TYLENOL) suppository 650 mg  650 mg Rectal Q6H PRN    polyethylene glycol (MIRALAX) packet 17 g  17 g Oral DAILY    bisacodyL (DULCOLAX) tablet 5 mg  5 mg Oral DAILY PRN    promethazine (PHENERGAN) tablet 12.5 mg  12.5 mg Oral Q6H PRN    Or    ondansetron (ZOFRAN) injection 4 mg  4 mg IntraVENous Q6H PRN          Cherry Garcia MD              [facility]  [facility] [employer]  [address], [address]  [place], [address]  Phone - [phone]               Fax - [phone]

## 2021-06-06 NOTE — PROGRESS NOTES
Problem: Falls - Risk of  Goal: *Absence of Falls  Description: Document Francheska Kraft Fall Risk and appropriate interventions in the flowsheet.   Outcome: Progressing Towards Goal  Note: Fall Risk Interventions:

## 2021-06-06 NOTE — CONSULTS
Gastroenterology Consult     Referring Physician: Cary Lema  PCP:  Keeley Martin MD  Gastroenterologist:  Yadira Vences MD    Consult Date: 6/6/2021     Subjective:     Chief Complaint: anemia    History of Present Illness: Derek Omer is a 70 y.o. female who is seen in consultation for anemia. She presnted with neck discomfort and nodules and was found to have a hgb of .6.0   Post transufion went to 7.0   (one unit)    No history black or bloody stools. No burning, indigestion, nausea dysphagia or sour stomach. History from the patient, but may not be reliable;  is in the room   (for example she does nor remember colonosocpy)      Prior egd 2018: Findings:   Esophagus:normal  Stomach: moderate hiatal hernia, moderate erythema throughout the stomach s/p biopsies  Duodenum: patchy erythema in the bulb and second portion of the duodenum s/p biopsies    Prior colonoscopy 2018: Findings:   Rectum: normal  Sigmoid:     - Diverticulosis-severe with narrowing. Unable to pass with the pediatric colonoscope so switched to upper endoscope. Descending Colon:     - Diverticulosis  Transverse Colon: normal-believe that I reached the proximal transverse  Ascending Colon: not intubated  Cecum: not intubated    Bx:     FINAL PATHOLOGIC DIAGNOSIS   1. Small bowel, duodenum, biopsy:   No histopathologic abnormality. 2. Stomach, biopsy:   No histopathologic abnormality     + one of her meds (aggranox)  has some aspirin in it. Her  and I reviewed this. No motrin/ ibuprofen, bc powder or the like   Past Medical History:   Diagnosis Date    Arthritis     CAD (coronary artery disease)     Diabetes ([facility] [address]     Hypertension     Stroke [facility])      Past Surgical History:   Procedure Laterality Date    COLONOSCOPY N/A 2/16/2018    COLONOSCOPY performed by Laure Cortes MD at [address] [place] [address]      right knee replacement      No family history on file.   Social History     Tobacco Use    Smoking status: Never Smoker    Smokeless tobacco: Never Used   Substance Use Topics    Alcohol use: Yes     Comment: rarely       No Known Allergies  Current Facility-Administered Medications   Medication Dose Route Frequency    glucose chewable tablet 16 g  4 Tablet Oral PRN    dextrose (D50W) injection syrg 12.5-25 g  25-50 mL IntraVENous PRN    glucagon (GLUCAGEN) injection 1 mg  1 mg IntraMUSCular PRN    insulin lispro (HUMALOG) injection   SubCUTAneous AC&HS    [Held by provider] heparin (porcine) injection 5,000 Units  5,000 Units SubCUTAneous Q8H    ALPRAZolam (XANAX) tablet 0.25 mg  0.25 mg Oral QID PRN    amLODIPine (NORVASC) tablet 10 mg  10 mg Oral DAILY    [Held by provider] aspirin-dipyridamole (AGGRENOX)  mg per capsule 1 Capsule  1 Capsule Oral BID    atorvastatin (LIPITOR) tablet 40 mg  40 mg Oral DAILY    cloNIDine HCL (CATAPRES) tablet 0.1 mg  0.1 mg Oral BID    DULoxetine (CYMBALTA) capsule 60 mg  60 mg Oral DAILY    cyanocobalamin (VITAMIN B12) tablet 1,000 mcg  1,000 mcg Oral DAILY    pantoprazole (PROTONIX) 40 mg in 0.9% sodium chloride 10 mL injection  40 mg IntraVENous Q12H    [START ON 6/7/2021] insulin glargine (LANTUS) injection 10 Units  10 Units SubCUTAneous DAILY    0.9% sodium chloride infusion 250 mL  250 mL IntraVENous PRN    sodium chloride (NS) flush 5-40 mL  5-40 mL IntraVENous Q8H    sodium chloride (NS) flush 5-40 mL  5-40 mL IntraVENous PRN    acetaminophen (TYLENOL) tablet 650 mg  650 mg Oral Q6H PRN    Or    acetaminophen (TYLENOL) suppository 650 mg  650 mg Rectal Q6H PRN    polyethylene glycol (MIRALAX) packet 17 g  17 g Oral DAILY    bisacodyL (DULCOLAX) tablet 5 mg  5 mg Oral DAILY PRN    promethazine (PHENERGAN) tablet 12.5 mg  12.5 mg Oral Q6H PRN    Or    ondansetron (ZOFRAN) injection 4 mg  4 mg IntraVENous Q6H PRN        Review of Systems:  ROS not easliy obtained but I was able to get the following    A detailed review of systems was performed as follows:  Constitutional:  Negative  + sweats   Eyes:  No ocular sensitivity to the sun, blurred vision or double vision. Eye surgery for epiphora  ENMT:  +  nose runs, no sinus problems. Respiratory: No coughing, wheezing + exertional  sob     Cardiac:  No chest pain, exertional chest pain or palpitations  Gastrointestinal:  See history of the present illness  :   No pain with urination or hematuria  Musculoskeletal:  +arthritis or hot swollen joints. Endocrine:  \"cold on dialysis\"  +diabetes  Psychiatric:+ depression o+ feeling blue  Integumentary:  No skin rash or sensitivity to the sun. Neurologic:  No stroke or seizure; no numbness or tingling of the extremities. Heme-Lymphatic:  + history of anemia and histor of tranfusion + unexplained lumps or bumps--nnck, thigh    Objective:     Physical Exam:  Visit Vitals  BP (!) 139/59   Pulse 92   Temp 98.3 °F (36.8 °C)   Resp 20   Ht 5' 1\" (1.549 m)   Wt 66.7 kg (147 lb)   SpO2 100%   Breastfeeding No   BMI 27.78 kg/m²      Gen: elderly, nad   Skin:  Extremities and face reveal no rashes. l. HEENT: Sclerae anicteric. Extra-occular muscles are intact. No oral ulcers. No abnormal pigmentation of the lips. .  Cardiovascular: Regular rate and rhythm.+ systolic murmur rusb and lusb. No  gallops, or rubs. PMI nondisplaced. Respiratory:  Comfortable breathing with no accessory muscle use. Clear breath sounds with no wheezes, rales, or rhonchi. GI:  Abdomen nondistended, soft, and nontender. Normal active bowel sounds. No enlargement of the liver or spleen. No masses palpable. Rectal:  Deferred    Neurological:  Memory is not good, not formally tested. Psychiatric:  Mood appears appropriate with judgement intact. Lymphatic:  No cervical or supraclavicular adenopathy.     Lab/Data Review:  Recent Labs     06/06/21  1330 06/06/21  0309 06/05/21  1834   WBC  --  9.8 9.6   HGB 6.8* 7.0* 6.0*   HCT 20.8* 21.9* 18.6*   PLT  --  251 254 Recent Labs     06/06/21  0309 06/05/21  1834    136   K 4.0 4.9    100   CO2 30 31   BUN 65* 61*   CREA 5.16* 4.88*   GLU 74 115*   CA 8.4* 8.5     Recent Labs     06/06/21 0309 06/05/21  1834   ALT 23 23   AP 92 92   TBILI 0.6 0.4   TP 6.0* 6.3*   ALB 3.0* 3.1*   GLOB 3.0 3.2     No results for input(s): INR, PTP, APTT, INREXT in the last 72 hours. No results for input(s): FE, TIBC, PSAT, FERR in the last 72 hours. No results found for: FOL, RBCF   No results for input(s): PH, PCO2, PO2 in the last 72 hours.   Recent Labs     06/06/21 0309   CPK 77   CKNDX Cannot be calculated   TROIQ 0.06*     Lab Results   Component Value Date/Time    Cholesterol, total 131 09/05/2018 03:39 AM    HDL Cholesterol 61 09/05/2018 03:39 AM    LDL, calculated 52.4 09/05/2018 03:39 AM    Triglyceride 88 09/05/2018 03:39 AM    CHOL/HDL Ratio 2.1 09/05/2018 03:39 AM     Lab Results   Component Value Date/Time    Glucose (POC) 73 06/06/2021 11:52 AM    Glucose (POC) 86 06/06/2021 06:35 AM    Glucose (POC) 304 (H) 11/16/2019 11:06 AM    Glucose (POC) 163 (H) 11/16/2019 07:24 AM    Glucose (POC) 391 (H) 11/15/2019 08:34 PM     Lab Results   Component Value Date/Time    Color YELLOW/STRAW 11/13/2019 07:37 PM    Appearance CLEAR 11/13/2019 07:37 PM    Specific gravity 1.010 11/13/2019 07:37 PM    pH (UA) 7.0 11/13/2019 07:37 PM    Protein TRACE (A) 11/13/2019 07:37 PM    Glucose NEGATIVE  11/13/2019 07:37 PM    Ketone NEGATIVE  11/13/2019 07:37 PM    Bilirubin NEGATIVE  11/13/2019 07:37 PM    Urobilinogen 0.2 11/13/2019 07:37 PM    Nitrites NEGATIVE  11/13/2019 07:37 PM    Leukocyte Esterase MODERATE (A) 11/13/2019 07:37 PM    Epithelial cells FEW 11/13/2019 07:37 PM    Bacteria 1+ (A) 11/13/2019 07:37 PM    WBC 20-50 11/13/2019 07:37 PM    RBC 0-5 11/13/2019 07:37 PM        Stool was heme pos  Assessment/Plan:     Active Problems:    Anemia (6/5/2021)      GI bleed (6/6/2021)       Normocytic anemia  Heme pos  Not actively bleeding    Rec:  1) iron studies  2) consider egd tomorrow  I discussed with her and her  the objectives, risks, consequences and alternatives to the procedure.   Dr Brice Guerra    3) Eduardo Alonso MD  2:50 PM  6/6/2021

## 2021-06-07 ENCOUNTER — ANESTHESIA (OUTPATIENT)
Dept: ENDOSCOPY | Age: 76
DRG: 377 | End: 2021-06-07
Payer: MEDICARE

## 2021-06-07 ENCOUNTER — ANESTHESIA EVENT (OUTPATIENT)
Dept: ENDOSCOPY | Age: 76
DRG: 377 | End: 2021-06-07
Payer: MEDICARE

## 2021-06-07 LAB
ANION GAP SERPL CALC-SCNC: 8 MMOL/L (ref 5–15)
BASOPHILS # BLD: 0 K/UL (ref 0–0.1)
BASOPHILS NFR BLD: 0 % (ref 0–1)
BUN SERPL-MCNC: 69 MG/DL (ref 6–20)
BUN/CREAT SERPL: 11 (ref 12–20)
CALCIUM SERPL-MCNC: 8.1 MG/DL (ref 8.5–10.1)
CHLORIDE SERPL-SCNC: 101 MMOL/L (ref 97–108)
CO2 SERPL-SCNC: 27 MMOL/L (ref 21–32)
CREAT SERPL-MCNC: 6.01 MG/DL (ref 0.55–1.02)
DIFFERENTIAL METHOD BLD: ABNORMAL
EOSINOPHIL # BLD: 0.2 K/UL (ref 0–0.4)
EOSINOPHIL NFR BLD: 2 % (ref 0–7)
ERYTHROCYTE [DISTWIDTH] IN BLOOD BY AUTOMATED COUNT: 16.9 % (ref 11.5–14.5)
FERRITIN SERPL-MCNC: 886 NG/ML (ref 8–252)
GLUCOSE BLD STRIP.AUTO-MCNC: 102 MG/DL (ref 65–117)
GLUCOSE BLD STRIP.AUTO-MCNC: 104 MG/DL (ref 65–117)
GLUCOSE BLD STRIP.AUTO-MCNC: 55 MG/DL (ref 65–117)
GLUCOSE BLD STRIP.AUTO-MCNC: 56 MG/DL (ref 65–117)
GLUCOSE BLD STRIP.AUTO-MCNC: 67 MG/DL (ref 65–117)
GLUCOSE BLD STRIP.AUTO-MCNC: 81 MG/DL (ref 65–117)
GLUCOSE BLD STRIP.AUTO-MCNC: 83 MG/DL (ref 65–117)
GLUCOSE SERPL-MCNC: 75 MG/DL (ref 65–100)
HAPTOGLOB SERPL-MCNC: 204 MG/DL (ref 30–200)
HCT VFR BLD AUTO: 22.6 % (ref 35–47)
HGB BLD-MCNC: 7.2 G/DL (ref 11.5–16)
IMM GRANULOCYTES # BLD AUTO: 0 K/UL (ref 0–0.04)
IMM GRANULOCYTES NFR BLD AUTO: 0 % (ref 0–0.5)
IRON SATN MFR SERPL: 12 % (ref 20–50)
IRON SATN MFR SERPL: 8 % (ref 20–50)
IRON SERPL-MCNC: 25 UG/DL (ref 35–150)
IRON SERPL-MCNC: 38 UG/DL (ref 35–150)
LDH SERPL L TO P-CCNC: 251 U/L (ref 81–246)
LYMPHOCYTES # BLD: 1.3 K/UL (ref 0.8–3.5)
LYMPHOCYTES NFR BLD: 12 % (ref 12–49)
MCH RBC QN AUTO: 29.3 PG (ref 26–34)
MCHC RBC AUTO-ENTMCNC: 31.9 G/DL (ref 30–36.5)
MCV RBC AUTO: 91.9 FL (ref 80–99)
MONOCYTES # BLD: 1 K/UL (ref 0–1)
MONOCYTES NFR BLD: 10 % (ref 5–13)
NEUTS SEG # BLD: 8 K/UL (ref 1.8–8)
NEUTS SEG NFR BLD: 76 % (ref 32–75)
NRBC # BLD: 0 K/UL (ref 0–0.01)
NRBC BLD-RTO: 0 PER 100 WBC
PLATELET # BLD AUTO: 261 K/UL (ref 150–400)
PMV BLD AUTO: 11.2 FL (ref 8.9–12.9)
POTASSIUM SERPL-SCNC: 4.3 MMOL/L (ref 3.5–5.1)
RBC # BLD AUTO: 2.46 M/UL (ref 3.8–5.2)
SERVICE CMNT-IMP: ABNORMAL
SERVICE CMNT-IMP: ABNORMAL
SERVICE CMNT-IMP: NORMAL
SODIUM SERPL-SCNC: 136 MMOL/L (ref 136–145)
TIBC SERPL-MCNC: 302 UG/DL (ref 250–450)
TIBC SERPL-MCNC: 315 UG/DL (ref 250–450)
WBC # BLD AUTO: 10.6 K/UL (ref 3.6–11)

## 2021-06-07 PROCEDURE — 2709999900 HC NON-CHARGEABLE SUPPLY: Performed by: INTERNAL MEDICINE

## 2021-06-07 PROCEDURE — 77010033678 HC OXYGEN DAILY

## 2021-06-07 PROCEDURE — 74011250636 HC RX REV CODE- 250/636: Performed by: INTERNAL MEDICINE

## 2021-06-07 PROCEDURE — 77030019988 HC FCPS ENDOSC DISP BSC -B: Performed by: INTERNAL MEDICINE

## 2021-06-07 PROCEDURE — 65270000029 HC RM PRIVATE

## 2021-06-07 PROCEDURE — 5A1D70Z PERFORMANCE OF URINARY FILTRATION, INTERMITTENT, LESS THAN 6 HOURS PER DAY: ICD-10-PCS | Performed by: INTERNAL MEDICINE

## 2021-06-07 PROCEDURE — 83615 LACTATE (LD) (LDH) ENZYME: CPT

## 2021-06-07 PROCEDURE — 85025 COMPLETE CBC W/AUTO DIFF WBC: CPT

## 2021-06-07 PROCEDURE — 36415 COLL VENOUS BLD VENIPUNCTURE: CPT

## 2021-06-07 PROCEDURE — 83010 ASSAY OF HAPTOGLOBIN QUANT: CPT

## 2021-06-07 PROCEDURE — 74011000250 HC RX REV CODE- 250: Performed by: INTERNAL MEDICINE

## 2021-06-07 PROCEDURE — C9113 INJ PANTOPRAZOLE SODIUM, VIA: HCPCS | Performed by: INTERNAL MEDICINE

## 2021-06-07 PROCEDURE — 88305 TISSUE EXAM BY PATHOLOGIST: CPT

## 2021-06-07 PROCEDURE — 0DB98ZX EXCISION OF DUODENUM, VIA NATURAL OR ARTIFICIAL OPENING ENDOSCOPIC, DIAGNOSTIC: ICD-10-PCS | Performed by: INTERNAL MEDICINE

## 2021-06-07 PROCEDURE — 74011250637 HC RX REV CODE- 250/637: Performed by: INTERNAL MEDICINE

## 2021-06-07 PROCEDURE — 83540 ASSAY OF IRON: CPT

## 2021-06-07 PROCEDURE — 74011636637 HC RX REV CODE- 636/637: Performed by: INTERNAL MEDICINE

## 2021-06-07 PROCEDURE — 76060000031 HC ANESTHESIA FIRST 0.5 HR: Performed by: INTERNAL MEDICINE

## 2021-06-07 PROCEDURE — 82962 GLUCOSE BLOOD TEST: CPT

## 2021-06-07 PROCEDURE — 80048 BASIC METABOLIC PNL TOTAL CA: CPT

## 2021-06-07 PROCEDURE — 94761 N-INVAS EAR/PLS OXIMETRY MLT: CPT

## 2021-06-07 PROCEDURE — 94760 N-INVAS EAR/PLS OXIMETRY 1: CPT

## 2021-06-07 PROCEDURE — 74011000250 HC RX REV CODE- 250: Performed by: NURSE ANESTHETIST, CERTIFIED REGISTERED

## 2021-06-07 PROCEDURE — 90935 HEMODIALYSIS ONE EVALUATION: CPT

## 2021-06-07 PROCEDURE — 76040000019: Performed by: INTERNAL MEDICINE

## 2021-06-07 PROCEDURE — 82728 ASSAY OF FERRITIN: CPT

## 2021-06-07 PROCEDURE — 74011250636 HC RX REV CODE- 250/636: Performed by: NURSE ANESTHETIST, CERTIFIED REGISTERED

## 2021-06-07 RX ORDER — EPINEPHRINE 0.1 MG/ML
1 INJECTION INTRACARDIAC; INTRAVENOUS
Status: DISCONTINUED | OUTPATIENT
Start: 2021-06-07 | End: 2021-06-07 | Stop reason: HOSPADM

## 2021-06-07 RX ORDER — DEXTROMETHORPHAN/PSEUDOEPHED 2.5-7.5/.8
1.2 DROPS ORAL
Status: DISCONTINUED | OUTPATIENT
Start: 2021-06-07 | End: 2021-06-07 | Stop reason: HOSPADM

## 2021-06-07 RX ORDER — SODIUM CHLORIDE 0.9 % (FLUSH) 0.9 %
5-40 SYRINGE (ML) INJECTION AS NEEDED
Status: DISCONTINUED | OUTPATIENT
Start: 2021-06-07 | End: 2021-06-09 | Stop reason: HOSPADM

## 2021-06-07 RX ORDER — LIDOCAINE HYDROCHLORIDE 20 MG/ML
INJECTION, SOLUTION EPIDURAL; INFILTRATION; INTRACAUDAL; PERINEURAL AS NEEDED
Status: DISCONTINUED | OUTPATIENT
Start: 2021-06-07 | End: 2021-06-07 | Stop reason: HOSPADM

## 2021-06-07 RX ORDER — NALOXONE HYDROCHLORIDE 0.4 MG/ML
0.4 INJECTION, SOLUTION INTRAMUSCULAR; INTRAVENOUS; SUBCUTANEOUS
Status: ACTIVE | OUTPATIENT
Start: 2021-06-07 | End: 2021-06-07

## 2021-06-07 RX ORDER — FLUMAZENIL 0.1 MG/ML
0.2 INJECTION INTRAVENOUS
Status: ACTIVE | OUTPATIENT
Start: 2021-06-07 | End: 2021-06-07

## 2021-06-07 RX ORDER — ATROPINE SULFATE 0.1 MG/ML
0.5 INJECTION INTRAVENOUS
Status: DISCONTINUED | OUTPATIENT
Start: 2021-06-07 | End: 2021-06-07 | Stop reason: HOSPADM

## 2021-06-07 RX ORDER — SODIUM CHLORIDE 9 MG/ML
100 INJECTION, SOLUTION INTRAVENOUS CONTINUOUS
Status: DISPENSED | OUTPATIENT
Start: 2021-06-07 | End: 2021-06-07

## 2021-06-07 RX ORDER — PROPOFOL 10 MG/ML
INJECTION, EMULSION INTRAVENOUS AS NEEDED
Status: DISCONTINUED | OUTPATIENT
Start: 2021-06-07 | End: 2021-06-07 | Stop reason: HOSPADM

## 2021-06-07 RX ORDER — MIDAZOLAM HYDROCHLORIDE 1 MG/ML
.25-5 INJECTION, SOLUTION INTRAMUSCULAR; INTRAVENOUS
Status: ACTIVE | OUTPATIENT
Start: 2021-06-07 | End: 2021-06-07

## 2021-06-07 RX ORDER — SODIUM CHLORIDE 0.9 % (FLUSH) 0.9 %
5-40 SYRINGE (ML) INJECTION EVERY 8 HOURS
Status: DISCONTINUED | OUTPATIENT
Start: 2021-06-07 | End: 2021-06-09 | Stop reason: HOSPADM

## 2021-06-07 RX ADMIN — SEVELAMER CARBONATE 1600 MG: 800 TABLET, FILM COATED ORAL at 12:34

## 2021-06-07 RX ADMIN — IRON SUCROSE 200 MG: 20 INJECTION, SOLUTION INTRAVENOUS at 11:23

## 2021-06-07 RX ADMIN — PROPOFOL 30 MG: 10 INJECTION, EMULSION INTRAVENOUS at 09:55

## 2021-06-07 RX ADMIN — SODIUM CHLORIDE 40 MG: 9 INJECTION, SOLUTION INTRAMUSCULAR; INTRAVENOUS; SUBCUTANEOUS at 11:11

## 2021-06-07 RX ADMIN — Medication 10 ML: at 22:27

## 2021-06-07 RX ADMIN — INSULIN GLARGINE 10 UNITS: 100 INJECTION, SOLUTION SUBCUTANEOUS at 11:31

## 2021-06-07 RX ADMIN — DULOXETINE HYDROCHLORIDE 60 MG: 30 CAPSULE, DELAYED RELEASE ORAL at 11:13

## 2021-06-07 RX ADMIN — ATORVASTATIN CALCIUM 40 MG: 40 TABLET, FILM COATED ORAL at 11:13

## 2021-06-07 RX ADMIN — SEVELAMER CARBONATE 1600 MG: 800 TABLET, FILM COATED ORAL at 19:56

## 2021-06-07 RX ADMIN — Medication 10 ML: at 11:31

## 2021-06-07 RX ADMIN — AMLODIPINE BESYLATE 10 MG: 5 TABLET ORAL at 11:13

## 2021-06-07 RX ADMIN — DEXTROSE MONOHYDRATE 12.5 G: 25 INJECTION, SOLUTION INTRAVENOUS at 09:45

## 2021-06-07 RX ADMIN — SODIUM CHLORIDE 40 MG: 9 INJECTION, SOLUTION INTRAMUSCULAR; INTRAVENOUS; SUBCUTANEOUS at 22:26

## 2021-06-07 RX ADMIN — CLONIDINE HYDROCHLORIDE 0.1 MG: 0.1 TABLET ORAL at 11:14

## 2021-06-07 RX ADMIN — CYANOCOBALAMIN TAB 500 MCG 1000 MCG: 500 TAB at 11:13

## 2021-06-07 RX ADMIN — SODIUM CHLORIDE 100 ML/HR: 9 INJECTION, SOLUTION INTRAVENOUS at 09:36

## 2021-06-07 RX ADMIN — Medication 10 ML: at 22:28

## 2021-06-07 RX ADMIN — PROPOFOL 30 MG: 10 INJECTION, EMULSION INTRAVENOUS at 09:53

## 2021-06-07 RX ADMIN — LIDOCAINE HYDROCHLORIDE 60 MG: 20 INJECTION, SOLUTION EPIDURAL; INFILTRATION; INTRACAUDAL; PERINEURAL at 09:53

## 2021-06-07 RX ADMIN — CLONIDINE HYDROCHLORIDE 0.1 MG: 0.1 TABLET ORAL at 19:56

## 2021-06-07 NOTE — PROCEDURES
[facility]  [phone]    Vitals   Pre   Post   Assessment   Pre   Post     Temp  Temp: 97.1 °F (36.2 °C) (06/07/21 1430)  98.1 LOC  A&OX4 A&OX4   HR   Pulse (Heart Rate): 68 (06/07/21 1430) 62 Lungs   diminished  diminished   B/P   BP: (!) 125/55 (06/07/21 1430) 127/45 Cardiac   HRR  HRR   Resp   Resp Rate: 18 (06/07/21 1430) 10 Skin   Warm and dry  warm and dry   Pain level  Pain Intensity 1: 0 (06/07/21 1039) 1:0 Edema  none     none   Orders:    Duration:   Start:    1430 End:    1800 Total:   3.5 hours   Dialyzer:   Dialyzer/Set Up Inspection: Revaclear (06/07/21 1430)   K Bath:   Dialysate K (mEq/L): 2 (06/07/21 1430)   Ca Bath:   Dialysate CA (mEq/L): 2.5 (06/07/21 1430)   Na/Bicarb:   Dialysate NA (mEq/L): 138 (06/07/21 1430)   Target Fluid Removal:   Goal/Amount of Fluid to Remove (mL): 2000 mL (06/07/21 1430)   Access     Type & Location:   LUE AVG, 15 gauge needles used. Post tx: Sites held 10 minutes, bleeding stopped. Labs     Obtained/Reviewed   Critical Results Called   Date when labs were drawn-  Hgb-    HGB   Date Value Ref Range Status   06/07/2021 7.2 (L) 11.5 - 16.0 g/dL Final     K-    Potassium   Date Value Ref Range Status   06/07/2021 4.3 3.5 - 5.1 mmol/L Final     Ca-   Calcium   Date Value Ref Range Status   06/07/2021 8.1 (L) 8.5 - 10.1 MG/DL Final     Bun-   BUN   Date Value Ref Range Status   06/07/2021 69 (H) 6 - 20 MG/DL Final     Creat-   Creatinine   Date Value Ref Range Status   06/07/2021 6.01 (H) 0.55 - 1.02 MG/DL Final        Medications/ Blood Products Given     Name   Dose   Route and Time     none                Blood Volume Processed (BVP):    76.7 Net Fluid   Removed:  2000 ml   Comments Per Dr Branyd Powell no need to give PRBCs today. No problems during tx. Report to laurie Li RN.   Time Out Done: yes, 1430  Primary Nurse Rpt Pre: Emigdio Obrien RN  Primary Nurse Rpt Post: Emigdio Obrien RN  Pt Education: procedural  Care Plan: routine HD  Tx Summary: 3.5 hours, 2 k 2.5 Ca 138/40 removed 2000 ml. Admiting Diagnosis:  [facility]  Consent signed -  yes  [facility] Consent -   Hepatitis Status- negative/immune 12-7-20  Machine #- Machine Number: [id number] (06/07/21 7056)  Telemetry status-no  Pre-dialysis wt. -

## 2021-06-07 NOTE — PROGRESS NOTES
End of Shift Note    Bedside shift change report given to [facility] (oncoming nurse) by Mariah Ho (offgoing nurse). Report included the following information SBAR, Kardex and MAR    Shift worked:  7p-7a     Shift summary and any significant changes:     Humalog was held due to the patient's blood glucose level, see MAR. Scheduled medications have been given, see MAR. Patient did not complain of any pain during my shift. Patient is up with the assistance of one to the bedside commode. IV has been flushed and is patent. Patient teaching and routine rounding has been done. Concerns for physician to address:       Zone phone for oncoming shift:          Activity:  Activity Level: Up with Assistance, Bath Room Privileges  Number times ambulated in hallways past shift: 0  Number of times OOB to chair past shift: 0    Cardiac:   Cardiac Monitoring: Yes      Cardiac Rhythm: Sinus Rhythm    Access:   Current line(s): PIV     Genitourinary:   Urinary status: voiding    Respiratory:   O2 Device: Nasal cannula (Patient uses a CPAP at home at nights.)  Chronic home O2 use?: YES  Incentive spirometer at bedside: NO     GI:  Last Bowel Movement Date: 06/06/21  Current diet:  DIET NPO  Passing flatus: YES  Tolerating current diet: YES       Pain Management:   Patient states pain is manageable on current regimen: YES    Skin:  Chance Score: 20  Interventions: float heels and increase time out of bed    Patient Safety:  Fall Score:  Total Score: 3  Interventions: bed/chair alarm, gripper socks and pt to call before getting OOB  High Fall Risk: Yes    Length of Stay:  Expected LOS: - - -  Actual LOS: 1      Mariah Ho

## 2021-06-07 NOTE — PROCEDURES
[facility]                   Endoscopic Gastroduodenoscopy Procedure Note      6/7/2021  Kim Carlos  1945  [id number]    Procedure: Endoscopic Gastroduodenoscopy with biopsy    Indication: heme positive stool, anemia     Pre-operative Diagnosis: see indication above    Post-operative Diagnosis: see findings below    : ALDEN Lee MD    Referring Provider:  Jai Oakes MD      Anesthesia/Sedation:  MAC anesthesia Propofol    Airway assessment: No airway problems anticipated    Pre-Procedural Exam:      Airway: clear, no airway problems anticipated  Heart: RRR, without gallops or rubs  Lungs: clear bilaterally without wheezes, crackles, or rhonchi  Abdomen: soft, nontender, nondistended, bowel sounds present  Mental Status: awake, alert and oriented to person, place and time       Procedure Details     After infomed consent was obtained for the procedure, with all risks and benefits of procedure explained the patient was taken to the endoscopy suite and placed in the left lateral decubitus position. Following sequential administration of sedation as per above, the endoscope was inserted into the mouth and advanced under direct vision to second portion of the duodenum. A careful inspection was made as the gastroscope was withdrawn, including a retroflexed view of the proximal stomach; findings and interventions are described below. Findings:   Esophagus:normal  Stomach: normal   Duodenum: moderate duodenitis, but no active bleeding. Biopsies done    Therapies:  biopsy of duodenal second portion    Specimens: duodenal biopsies           Complications:   None; patient tolerated the procedure well. EBL:  None. Impression:      1. Esophagus--normal  2 Stomach--normal  3. Moderate duodenitis    Recommendations:  -Continue acid suppression. , -Await pathology.   Just had colonoscopy in 2018 which was a very difficult exam due to severe diverticulosis. My advice would be to not repeat it at this time.     Mendy Turner., MD6/7/2021

## 2021-06-07 NOTE — ANESTHESIA PREPROCEDURE EVALUATION
Anesthetic History   No history of anesthetic complications            Review of Systems / Medical History  Patient summary reviewed, nursing notes reviewed and pertinent labs reviewed    Pulmonary  Within defined limits                 Neuro/Psych       CVA      Comments: Cerebral microvascular disease Cardiovascular    Hypertension          CAD    Exercise tolerance: <4 METS  Comments: Bilateral carotid artery stenosis     GI/Hepatic/Renal         Renal disease: ESRD      Comments: Hx GI bleed Endo/Other    Diabetes    Arthritis and anemia     Other Findings   Comments: Hb 7.2  DJD  Hx DVT             Physical Exam    Airway  Mallampati: III  TM Distance: > 6 cm  Neck ROM: normal range of motion   Mouth opening: Normal     Cardiovascular  Regular rate and rhythm,  S1 and S2 normal,  no murmur, click, rub, or gallop             Dental  No notable dental hx       Pulmonary  Breath sounds clear to auscultation               Abdominal  GI exam deferred       Other Findings            Anesthetic Plan    ASA: 4  Anesthesia type: total IV anesthesia          Induction: Intravenous  Anesthetic plan and risks discussed with: Patient

## 2021-06-07 NOTE — PROGRESS NOTES
Patient's blood sugar, 56. Rechecked immediately from different finger stick and blood sugar was 55. Patient completely asymptomatic and states \"I dont know when my blood sugar is low. \"    Reported off to Mna Saha RN who is patient's procedure nurse. CLAUDE rBiggs spoke with Dr. Valentino Gonsalez and received an order for D50% (see MAR).

## 2021-06-07 NOTE — PROGRESS NOTES
Olvin Benavidez  1945  [id number]    Situation:  Verbal report received from: Tricia Oro RN  Procedure: Procedure(s):  ESOPHAGOGASTRODUODENOSCOPY (EGD)  ESOPHAGOGASTRODUODENAL (EGD) BIOPSY    Background:    Preoperative diagnosis: anemia  Postoperative diagnosis: Iron Deficent anemia, Duodenitis    :  Dr. Divya Singer  Assistant(s): Endoscopy Technician-1: Kaz Morales  Endoscopy RN-1: Sandhya MACARIO    Specimens:   ID Type Source Tests Collected by Time Destination   1 : Duodenum Bx Preservative Duodenum  Governor MD Melissa 6/7/2021 9322 Pathology     H. Pylori  no    Assessment:  Anesthesia gave intra-procedure sedation and medications, see anesthesia flow sheet yes    Intravenous fluids: NS@ KVO     Vital signs stable yes    Abdominal assessment: round and soft yes    Recommendation:  Discharge patient per MD order no.   Return to floor yes  Family or Arnetha Handy,   Permission to share finding with family or friend yes

## 2021-06-07 NOTE — ANESTHESIA POSTPROCEDURE EVALUATION
Procedure(s):  ESOPHAGOGASTRODUODENOSCOPY (EGD)  ESOPHAGOGASTRODUODENAL (EGD) BIOPSY. total IV anesthesia    Anesthesia Post Evaluation        Patient location during evaluation: PACU  Note status: Adequate. Level of consciousness: responsive to verbal stimuli and sleepy but conscious  Pain management: satisfactory to patient  Airway patency: patent  Anesthetic complications: no  Cardiovascular status: acceptable  Respiratory status: acceptable  Hydration status: acceptable  Comments: +Post-Anesthesia Evaluation and Assessment    Patient: Ash Abdullahi MRN: [id number]  SSN: [id number]   YOB: 1945  Age: 70 y.o. Sex: female      Cardiovascular Function/Vital Signs    BP (!) 120/38   Pulse 86   Temp 36.4 °C (97.6 °F)   Resp 20   Ht 5' 6\" (1.676 m)   Wt 66.2 kg (146 lb)   SpO2 95%   Breastfeeding No   BMI 23.57 kg/m²     Patient is status post Procedure(s):  ESOPHAGOGASTRODUODENOSCOPY (EGD)  ESOPHAGOGASTRODUODENAL (EGD) BIOPSY. Nausea/Vomiting: Controlled. Postoperative hydration reviewed and adequate. Pain:  Pain Scale 1: Numeric (0 - 10) (06/07/21 0932)  Pain Intensity 1: 0 (06/07/21 0932)   Managed. Neurological Status: At baseline. Mental Status and Level of Consciousness: Arousable. Pulmonary Status:   O2 Device: Nasal cannula (06/07/21 1003)   Adequate oxygenation and airway patent. Complications related to anesthesia: None    Post-anesthesia assessment completed. No concerns. Signed By: Eligio Ta MD    6/7/2021  Post anesthesia nausea and vomiting:  controlled      INITIAL Post-op Vital signs: No vitals data found for the desired time range.

## 2021-06-07 NOTE — PROGRESS NOTES
Nephrology Progress Note  Mitch Eddy     [email or web address]. [email or web address]  Phone - [phone]   Patient: Purnima Jacobo    YOB: 1945        Date- 6/7/2021   Admit Date: 6/5/2021  CC: Follow up for ESRD          IMPRESSION & PLAN:     ESRD- Samantha watson - [facility]   Anemia of ckd   Hypertension   GI bleed   Sec. hyperpara   H/o prolonged bleeding at hd access site-    PLAN-   Plan for HD after EGD today.  Hgb still labile,s/p PRBC   Continue norvasc and clonidine   epogen for anemia   Will order for venofer with HD today. Subjective: Interval History:   -  Feels better  - NPO this am.    Objective:   Vitals:    06/06/21 2306 06/07/21 0420 06/07/21 0849 06/07/21 0932   BP: (!) 142/52 (!) 127/58 (!) 125/48 (!) 147/49   Pulse: 74 71 65 66   Resp: 18 18 18 20   Temp: 97.9 °F (36.6 °C) 98 °F (36.7 °C) 98 °F (36.7 °C) 97.6 °F (36.4 °C)   SpO2: 100% 100% 100% 96%   Weight:    66.2 kg (146 lb)   Height:    5' 6\" (1.676 m)      No intake/output data recorded. Last 3 Recorded Weights in this Encounter    06/05/21 1623 06/06/21 1736 06/07/21 0932   Weight: 66.7 kg (147 lb) 66.5 kg (146 lb 11.2 oz) 66.2 kg (146 lb)      Physical exam:   GEN: NAD  NECK- Supple, no mass  RESP: No wheezing, Clear b/l  CVS: S1,S2  RRR  NEURO: Normal speech, Non focal  EXT: trace Edema   PSYCH: Normal Mood  Left arm av graft bruit +    Chart reviewed. Pertinent Notes reviewed.      Data Review :  Recent Labs     06/07/21  0140 06/06/21  0309 06/05/21  1834    138 136   K 4.3 4.0 4.9    102 100   CO2 27 30 31   BUN 69* 65* 61*   CREA 6.01* 5.16* 4.88*   GLU 75 74 115*   CA 8.1* 8.4* 8.5     Recent Labs     06/07/21  0140 06/06/21  1330 06/06/21  0309 06/05/21  1834   WBC 10.6  --  9.8 9.6   HGB 7.2* 6.8* 7.0* 6.0*   HCT 22.6* 20.8* 21.9* 18.6*     --  251 254     Recent Labs     06/07/21  0140 06/06/21  1605 06/06/21  0309   TIBC 302 315  --    PSAT 8* 12*  --    FERR 886*  -- 815*      Medication list  reviewed  Current Facility-Administered Medications   Medication Dose Route Frequency    0.9% sodium chloride infusion  100 mL/hr IntraVENous CONTINUOUS    sodium chloride (NS) flush 5-40 mL  5-40 mL IntraVENous Q8H    sodium chloride (NS) flush 5-40 mL  5-40 mL IntraVENous PRN    midazolam (VERSED) injection 0.25-5 mg  0.25-5 mg IntraVENous Multiple    naloxone (NARCAN) injection 0.4 mg  0.4 mg IntraVENous Multiple    flumazeniL (ROMAZICON) 0.1 mg/mL injection 0.2 mg  0.2 mg IntraVENous Multiple    simethicone (MYLICON) 91IO/3.3SV oral drops 80 mg  1.2 mL Oral Multiple    atropine injection 0.5 mg  0.5 mg IntraVENous ONCE PRN    EPINEPHrine (ADRENALIN) 0.1 mg/mL syringe 1 mg  1 mg Endoscopically ONCE PRN    glucose chewable tablet 16 g  4 Tablet Oral PRN    dextrose (D50W) injection syrg 12.5-25 g  25-50 mL IntraVENous PRN    glucagon (GLUCAGEN) injection 1 mg  1 mg IntraMUSCular PRN    insulin lispro (HUMALOG) injection   SubCUTAneous AC&HS    [Held by provider] heparin (porcine) injection 5,000 Units  5,000 Units SubCUTAneous Q8H    ALPRAZolam (XANAX) tablet 0.25 mg  0.25 mg Oral QID PRN    amLODIPine (NORVASC) tablet 10 mg  10 mg Oral DAILY    [Held by provider] aspirin-dipyridamole (AGGRENOX)  mg per capsule 1 Capsule  1 Capsule Oral BID    atorvastatin (LIPITOR) tablet 40 mg  40 mg Oral DAILY    cloNIDine HCL (CATAPRES) tablet 0.1 mg  0.1 mg Oral BID    DULoxetine (CYMBALTA) capsule 60 mg  60 mg Oral DAILY    cyanocobalamin (VITAMIN B12) tablet 1,000 mcg  1,000 mcg Oral DAILY    pantoprazole (PROTONIX) 40 mg in 0.9% sodium chloride 10 mL injection  40 mg IntraVENous Q12H    insulin glargine (LANTUS) injection 10 Units  10 Units SubCUTAneous DAILY    sevelamer carbonate (RENVELA) tab 1,600 mg  1,600 mg Oral TID WITH MEALS    epoetin lucina-epbx (RETACRIT) injection 20,000 Units  20,000 Units SubCUTAneous Q MON, WED & FRI    iron sucrose (VENOFER) injection 200 mg  200 mg IntraVENous ONCE    0.9% sodium chloride infusion 250 mL  250 mL IntraVENous PRN    sodium chloride (NS) flush 5-40 mL  5-40 mL IntraVENous Q8H    sodium chloride (NS) flush 5-40 mL  5-40 mL IntraVENous PRN    acetaminophen (TYLENOL) tablet 650 mg  650 mg Oral Q6H PRN    Or    acetaminophen (TYLENOL) suppository 650 mg  650 mg Rectal Q6H PRN    polyethylene glycol (MIRALAX) packet 17 g  17 g Oral DAILY    bisacodyL (DULCOLAX) tablet 5 mg  5 mg Oral DAILY PRN    promethazine (PHENERGAN) tablet 12.5 mg  12.5 mg Oral Q6H PRN    Or    ondansetron (ZOFRAN) injection 4 mg  4 mg IntraVENous Q6H PRN          Vamshi Golden MD              [facility]  [facility], [address], [address]  Phone - [phone]               Fax - [phone]

## 2021-06-07 NOTE — PROGRESS NOTES
Transition of Care Plan:    RUR:22%  Disposition: Home with Family   Follow up appointments: Follow up with PCP   DME needed: TBD by therapist   Transportation at [id number]  [facility] will assist with transport   Marsha Valentino or means to access home:   Family will have keys      IM Medicare letter: 2nd  Medicare Letter to be provided   Caregiver Contact: Tatum Frost (spouse) [phone]  Discharge Caregiver contacted prior to discharge? Spouse to be contacted    Reason for Admission:   Anemia                     RUR Score:     22%             PCP: First and Last name:   Za John MD     Name of Practice:    Are you a current patient: Yes/No: YES    Approximate date of last visit:  Seen every 90 days    Can you participate in a virtual visit if needed: Yes, if recommended by physician     Do you (patient/family) have any concerns for transition/discharge? NO                   Plan for utilizing home health:   Possibly [address]    Current Advanced Directive/Advance Care Plan:  Full Code      Healthcare Decision Maker:   Click here to complete [place] including selection of the Healthcare Decision Maker Relationship (ie \"Primary\")              Transition of Care Plan:                  CM completed room visited pt's room, and completed her assessment with spouse. Pt is currently out of the room. Pt is known to reside with spouse in their one story home (5 steps into main entrance). Pt is known to be active with PCP: seen every 90 days, and uses [employer]. Pt is known to need limited assistance with ADl, and does not drive. Pt's spouse will provide transport home at the time of d/c. Pt has DME at home: wheelchair and walker. Pt has had HHC in the past, but no snf. Pt is known to be an current dialysis pt, provided by [facility]. MWF-11:30 chair. Pt spouse assist with transport to facility. Pt spouse is listed as medical decision maker.   Pt spouse is open to additional services if recommended at the time of d/c. CM will continue to follow.     crepitations      TIM Garcia, [address]

## 2021-06-07 NOTE — PROGRESS NOTES
Physical Therapy Note    Patient is CRISTINA for EGD at this time. Will return for PT evaluation as able. 1440 Patient CRISTINA for dialysis.  Wll check Hgb and attempt evaluation 6/8/21     Regi Cooley DPT

## 2021-06-07 NOTE — PROGRESS NOTES
Patient is being transferred from Room 2142 to endoscopy. Patient incontinent of urine, hank care, and new gown provided. Discussed patient with nurse. Patient only uses oxygen at night and does not need it during the day.  Patient's personal phone was left in the room

## 2021-06-07 NOTE — PERIOP NOTES
Endoscope was pre-cleaned at bedside immediately following procedure by Roderick Childress    TRANSFER - OUT REPORT:    Verbal report given to Hilary(name) on Christina Helton  being transferred to [facility](unit) for routine post - op       Report consisted of patients Situation, Background, Assessment and   Recommendations(SBAR). Information from the following report(s) SBAR was reviewed with the receiving nurse. Opportunity for questions and clarification was provided.

## 2021-06-07 NOTE — PROGRESS NOTES
Hospitalist Progress Note    NAME: Vaibhav Mireles   :  1945   MRN:  [id number]       Assessment / Plan:  Acute on chronic anemia likely due to renal failure and GI bleed  Hgb 9.0 in 10/2020, presenting with hgb 6, received 1 unit prbc.  hgb 7.2 today  Follow hgb, transfuse for hgb<7. S/p IV iron  Cont' IV PPI, can transition to PO tomorrw  S/p EGD showed mod duodenitis. No active bleeding. Biopsy take, pending. Pt on aggranox PTA for CVA prevention, currently on hold  Appreciate GI following  PT/OT to eval    Right jaw pain etiology unclear POA, resolved  Cardiac enzymes negative. EKG with inferolateral T wave inversion, but this is unchanged from prior EKGs    ESRD  Nephro for HD, MWF    Diabetes mellitus type 2 on insulin, nephropathy POA  Cont' lower dose lantus  Cont' SSI    Essential hypertension POA  History of stroke  Coronary artery disease POA in record, patient denies this  Hyperlipidemia POA  Hold Aggrenox  Cont' Norvasc, clonidine, Lipitor  EKG with inferolateral TWI, this is unchanged from several years ago     Anxiety depression  Continue Xanax as needed, Cymbalta    Overweight by definition Body mass index is 23.57 kg/m². Estimated discharge date:   Barriers:     Code status: Full  Prophylaxis: SCD's  Recommended Disposition: Home w/Family     Subjective:     Chief Complaint / Reason for Physician Visit  Pt awake, NAD. Denies any dizziness, cp, sob. Jaw pain resolved. Discussed with RN events overnight. Review of Systems:  Symptom Y/N Comments  Symptom Y/N Comments   Fever/Chills    Chest Pain     Poor Appetite    Edema     Cough    Abdominal Pain     Sputum    Joint Pain     SOB/KWON    Pruritis/Rash     Nausea/vomit    Tolerating PT/OT     Diarrhea    Tolerating Diet     Constipation    Other       Could NOT obtain due to:      Objective:     VITALS:   Last 24hrs VS reviewed since prior progress note.  Most recent are:  Patient Vitals for the past 24 hrs:   Temp Pulse Resp BP SpO2   06/07/21 1209 97.3 °F (36.3 °C) 66 20 (!) 131/52 100 %   06/07/21 1039 -- 68 22 (!) 133/51 95 %   06/07/21 1036 -- 64 22 (!) 138/53 97 %   06/07/21 1033 -- 67 20 (!) 131/57 95 %   06/07/21 1030 -- 67 22 (!) 133/46 95 %   06/07/21 1027 -- 67 24 (!) 135/55 95 %   06/07/21 1024 -- 67 25 (!) 130/50 96 %   06/07/21 1021 -- 67 22 (!) 136/52 98 %   06/07/21 1018 -- 67 20 (!) 132/47 98 %   06/07/21 1015 -- 68 23 (!) 138/52 98 %   06/07/21 1012 98.2 °F (36.8 °C) 66 22 (!) 131/46 99 %   06/07/21 1003 -- 86 20 (!) 120/38 95 %   06/07/21 0932 97.6 °F (36.4 °C) 66 20 (!) 147/49 96 %   06/07/21 0849 98 °F (36.7 °C) 65 18 (!) 125/48 100 %   06/07/21 0420 98 °F (36.7 °C) 71 18 (!) 127/58 100 %   06/06/21 2306 97.9 °F (36.6 °C) 74 18 (!) 142/52 100 %   06/06/21 2111 97.3 °F (36.3 °C) 66 18 (!) 114/54 99 %   06/06/21 1626 98.4 °F (36.9 °C) 69 18 (!) 135/54 100 %       Intake/Output Summary (Last 24 hours) at 6/7/2021 1252  Last data filed at 6/7/2021 1014  Gross per 24 hour   Intake 100 ml   Output --   Net 100 ml        I had a face to face encounter and independently examined this patient on 6/7/2021, as outlined below:  PHYSICAL EXAM:  General: WD, WN. Alert, cooperative, no acute distress    EENT:  EOMI. Anicteric sclerae. MMM  Resp:  CTA bilaterally, no wheezing or rales. No accessory muscle use  CV:  Regular  rhythm,  No edema  GI:  Soft, Non distended, Non tender. +Bowel sounds  Neurologic:  Alert and oriented X 3, normal speech,  Psych:   Good insight. Not anxious nor agitated  Skin:  No rashes.   No jaundice    Reviewed most current lab test results and cultures  YES  Reviewed most current radiology test results   YES  Review and summation of old records today    NO  Reviewed patient's current orders and MAR    YES  PMH/SH reviewed - no change compared to H&P  ________________________________________________________________________  Care Plan discussed with:    Comments   Patient x    Family      RN x    Care Manager     Consultant                        Multidiciplinary team rounds were held today with , nursing, pharmacist and clinical coordinator. Patient's plan of care was discussed; medications were reviewed and discharge planning was addressed. ________________________________________________________________________  Total NON critical care TIME:  35   Minutes    Total CRITICAL CARE TIME Spent:   Minutes non procedure based      Comments   >50% of visit spent in counseling and coordination of care     ________________________________________________________________________  Brian Kimball MD     Procedures: see electronic medical records for all procedures/Xrays and details which were not copied into this note but were reviewed prior to creation of Plan. LABS:  I reviewed today's most current labs and imaging studies.   Pertinent labs include:  Recent Labs     06/07/21  0140 06/06/21  1330 06/06/21  0309 06/05/21  1834   WBC 10.6  --  9.8 9.6   HGB 7.2* 6.8* 7.0* 6.0*   HCT 22.6* 20.8* 21.9* 18.6*     --  251 254     Recent Labs     06/07/21  0140 06/06/21  0309 06/05/21  1834    138 136   K 4.3 4.0 4.9    102 100   CO2 27 30 31   GLU 75 74 115*   BUN 69* 65* 61*   CREA 6.01* 5.16* 4.88*   CA 8.1* 8.4* 8.5   ALB  --  3.0* 3.1*   TBILI  --  0.6 0.4   ALT  --  23 23       Signed: Brian Kimball MD

## 2021-06-08 LAB
GLUCOSE BLD STRIP.AUTO-MCNC: 183 MG/DL (ref 65–117)
GLUCOSE BLD STRIP.AUTO-MCNC: 220 MG/DL (ref 65–117)
GLUCOSE BLD STRIP.AUTO-MCNC: 225 MG/DL (ref 65–117)
GLUCOSE BLD STRIP.AUTO-MCNC: 282 MG/DL (ref 65–117)
SERVICE CMNT-IMP: ABNORMAL

## 2021-06-08 PROCEDURE — 74011636637 HC RX REV CODE- 636/637: Performed by: INTERNAL MEDICINE

## 2021-06-08 PROCEDURE — 97530 THERAPEUTIC ACTIVITIES: CPT

## 2021-06-08 PROCEDURE — 74011250637 HC RX REV CODE- 250/637: Performed by: INTERNAL MEDICINE

## 2021-06-08 PROCEDURE — 65270000029 HC RM PRIVATE

## 2021-06-08 PROCEDURE — 74011250636 HC RX REV CODE- 250/636: Performed by: INTERNAL MEDICINE

## 2021-06-08 PROCEDURE — 94761 N-INVAS EAR/PLS OXIMETRY MLT: CPT

## 2021-06-08 PROCEDURE — 74011000250 HC RX REV CODE- 250: Performed by: INTERNAL MEDICINE

## 2021-06-08 PROCEDURE — 82962 GLUCOSE BLOOD TEST: CPT

## 2021-06-08 PROCEDURE — 97116 GAIT TRAINING THERAPY: CPT

## 2021-06-08 PROCEDURE — C9113 INJ PANTOPRAZOLE SODIUM, VIA: HCPCS | Performed by: INTERNAL MEDICINE

## 2021-06-08 PROCEDURE — 94760 N-INVAS EAR/PLS OXIMETRY 1: CPT

## 2021-06-08 PROCEDURE — 97161 PT EVAL LOW COMPLEX 20 MIN: CPT

## 2021-06-08 RX ORDER — PANTOPRAZOLE SODIUM 40 MG/1
40 TABLET, DELAYED RELEASE ORAL 2 TIMES DAILY
Status: DISCONTINUED | OUTPATIENT
Start: 2021-06-09 | End: 2021-06-09 | Stop reason: HOSPADM

## 2021-06-08 RX ORDER — INSULIN GLARGINE 100 [IU]/ML
17 INJECTION, SOLUTION SUBCUTANEOUS ONCE
Status: COMPLETED | OUTPATIENT
Start: 2021-06-08 | End: 2021-06-08

## 2021-06-08 RX ADMIN — CLONIDINE HYDROCHLORIDE 0.1 MG: 0.1 TABLET ORAL at 19:12

## 2021-06-08 RX ADMIN — Medication 10 ML: at 06:35

## 2021-06-08 RX ADMIN — Medication 10 ML: at 22:36

## 2021-06-08 RX ADMIN — CLONIDINE HYDROCHLORIDE 0.1 MG: 0.1 TABLET ORAL at 09:01

## 2021-06-08 RX ADMIN — SEVELAMER CARBONATE 1600 MG: 800 TABLET, FILM COATED ORAL at 09:01

## 2021-06-08 RX ADMIN — Medication 10 ML: at 22:35

## 2021-06-08 RX ADMIN — ATORVASTATIN CALCIUM 40 MG: 40 TABLET, FILM COATED ORAL at 09:01

## 2021-06-08 RX ADMIN — ACETAMINOPHEN 650 MG: 325 TABLET ORAL at 14:16

## 2021-06-08 RX ADMIN — CYANOCOBALAMIN TAB 500 MCG 1000 MCG: 500 TAB at 09:01

## 2021-06-08 RX ADMIN — SEVELAMER CARBONATE 1600 MG: 800 TABLET, FILM COATED ORAL at 16:51

## 2021-06-08 RX ADMIN — AMLODIPINE BESYLATE 10 MG: 5 TABLET ORAL at 09:01

## 2021-06-08 RX ADMIN — Medication 10 ML: at 14:00

## 2021-06-08 RX ADMIN — INSULIN GLARGINE 17 UNITS: 100 INJECTION, SOLUTION SUBCUTANEOUS at 22:36

## 2021-06-08 RX ADMIN — INSULIN GLARGINE 10 UNITS: 100 INJECTION, SOLUTION SUBCUTANEOUS at 09:01

## 2021-06-08 RX ADMIN — SEVELAMER CARBONATE 1600 MG: 800 TABLET, FILM COATED ORAL at 12:45

## 2021-06-08 RX ADMIN — INSULIN LISPRO 2 UNITS: 100 INJECTION, SOLUTION INTRAVENOUS; SUBCUTANEOUS at 16:51

## 2021-06-08 RX ADMIN — EPOETIN ALFA-EPBX 20000 UNITS: 20000 INJECTION, SOLUTION INTRAVENOUS; SUBCUTANEOUS at 00:14

## 2021-06-08 RX ADMIN — INSULIN LISPRO 3 UNITS: 100 INJECTION, SOLUTION INTRAVENOUS; SUBCUTANEOUS at 09:01

## 2021-06-08 RX ADMIN — INSULIN LISPRO 1 UNITS: 100 INJECTION, SOLUTION INTRAVENOUS; SUBCUTANEOUS at 22:36

## 2021-06-08 RX ADMIN — Medication 10 ML: at 06:36

## 2021-06-08 RX ADMIN — DULOXETINE HYDROCHLORIDE 60 MG: 30 CAPSULE, DELAYED RELEASE ORAL at 09:01

## 2021-06-08 RX ADMIN — SODIUM CHLORIDE 40 MG: 9 INJECTION, SOLUTION INTRAMUSCULAR; INTRAVENOUS; SUBCUTANEOUS at 09:00

## 2021-06-08 NOTE — PROGRESS NOTES
Problem: Mobility Impaired (Adult and Pediatric)  Goal: *Acute Goals and Plan of Care (Insert Text)  Description: FUNCTIONAL STATUS PRIOR TO ADMISSION: Patient uses 4WW during household ambulation, spouse provides assist for bathing and dressing. Patient able to get out of bed and transfer without assist. Uses w/c anytime leaving house & in community, uses ramp to exit house. HOME SUPPORT PRIOR TO ADMISSION: The patient lived spouse to provide assistance. Spouse reports there is no other local assistance. Physical Therapy Goals  Initiated 6/8/2021  1. Patient will move from supine to sit and sit to supine , scoot up and down, and roll side to side in bed with modified independence within 7 day(s). 2.  Patient will transfer from bed to chair and chair to bed with supervision/set-up using the least restrictive device within 7 day(s). 3.  Patient will perform sit to stand with supervision/set-up within 7 day(s). 4.  Patient will ambulate with supervision/set-up for 100 feet with the least restrictive device within 7 day(s). Outcome: Not Met   PHYSICAL THERAPY EVALUATION  Patient: Purnima Carrasco (70 y.o. female)  Date: 6/8/2021  Primary Diagnosis: Anemia [D64.9]  GI bleed [K92.2]  Procedure(s) (LRB):  ESOPHAGOGASTRODUODENOSCOPY (EGD) (N/A)  ESOPHAGOGASTRODUODENAL (EGD) BIOPSY (N/A) 1 Day Post-Op   Precautions:  Fall    ASSESSMENT  Based on the objective data described below, the patient presents with generalized weakness, decreased activity tolerance, impaired transfers, high risk for falling, impaired standing balance, impaired gait mechanics, and decreased overall independence following admission for A/C anemia and suspected GI bleed. Patient completed supine>sit Supervision, Min A scooting, sit<>stand CGA, and ambulation x40ft FWW CGA. Patient demonstrates wide BRISA during ambulation with shuffling/ataxic pattern, intermittent LOB with posterior tendency requiring Min A to maintain balance.  Patient lives with supportive spouse that reports he is able to provide assistance required, has all necessary DME in home environment. Patient will benefit from skilled PT services in hospital setting in order to address mentioned impairments so that patient can return to [facility]. Recommend [facility] therapy services upon d/c with 24/7 assist from family. Current Level of Function Impacting Discharge (mobility/balance): Supervision - Min A    Functional Outcome Measure: The patient scored 65/100 on the Barthel Index outcome measure which is indicative of moderately impaired functional mobility. Other factors to consider for discharge: supportive spouse that is able to provide assist      Patient will benefit from skilled therapy intervention to address the above noted impairments. PLAN :  Recommendations and Planned Interventions: bed mobility training, transfer training, gait training, therapeutic exercises, neuromuscular re-education, patient and family training/education and therapeutic activities      Frequency/Duration: Patient will be followed by physical therapy:  4 times a week to address goals. Recommendation for discharge: (in order for the patient to meet his/her long term goals)  Physical therapy at least 2 days/week in the home AND ensure assist and/or supervision for safety with functional mobility and ADLs    This discharge recommendation:  Has been made in collaboration with the attending provider and/or case management    IF patient discharges home will need the following DME: patient owns DME required for discharge     SUBJECTIVE:   Patient stated I can't even remember what year it is.  (Patient is able to correct state 2021)    OBJECTIVE DATA SUMMARY:   HISTORY:    Past Medical History:   Diagnosis Date    Arthritis     CAD (coronary artery disease)     Diabetes ([facility] [address]     Hypertension     Stroke [facility])      Past Surgical History:   Procedure Laterality Date    COLONOSCOPY N/A 2/16/2018 COL[facility] at [facility] ENDOSCOPY    HX GYN      HX ORTHOPAEDIC      right knee replacement    UPPER GI ENDOSCOPY,BIOPSY  6/7/2021          Personal factors and/or comorbidities impacting plan of care: DM, CAD, HTN, Hx of CVA     Home Situation  Home Environment: Private residence  # Steps to Enter: 2  Wheelchair Ramp: Yes  One/Two Story Residence: One story  Living Alone: No  Support Systems: Spouse/Significant Other/Partner  Patient Expects to be Discharged to[de-identified] [employer]  Current DME Used/Available at Home: Davida Enriquez, rollator, Wheelchair, Tub transfer bench, Commode, bedside  Tub or Shower Type: Tub/Shower combination    EXAMINATION/PRESENTATION/DECISION MAKING:   Critical Behavior:  Neurologic State: Alert, Confused  Orientation Level: Disoriented to situation  Cognition: Follows commands, Memory loss     Hearing:   Auditory  Auditory Impairment: None  Range Of Motion:  AROM: Generally decreased, functional  Strength:    Strength: Generally decreased, functional  Tone & Sensation:   Tone: Abnormal (L ankle)  Sensation: Intact    Coordination:  Coordination: Generally decreased, functional  Functional Mobility:  Bed Mobility:  Rolling: Supervision  Supine to Sit: Supervision  Scooting: Minimum assistance  Transfers:  Sit to Stand: Contact guard assistance  Stand to Sit: Contact guard assistance  Bed to Chair: Contact guard assistance  Balance:   Sitting: Intact  Standing: Impaired  Standing - Static: Good;Constant support  Standing - Dynamic : Fair;Constant support  Ambulation/Gait Training:  Distance (ft): 40 Feet (ft)  Assistive Device: Walker, rolling;Gait belt  Ambulation - Level of Assistance: Minimal assistance  Gait Abnormalities: Decreased step clearance  Base of Support: Widened  Speed/Shanthi: Slow  Step Length: Right shortened;Left shortened   Verbal instruction for patient to increase step length with minimal follow through, patient continued to demonstrate shuffling steps Therapeutic Activities:   Instructed patient in proper hand placement during sit<>stand. Upon standing, patient with posterior LOB requiring Min A to maintain balance. Initially ambulating with bilat HHA and progressing to walker. Functional Measure:  Barthel Index:    Bathin  Bladder: 5  Bowels: 10  Groomin  Dressin  Feeding: 10  Mobility: 10  Stairs: 5  Toilet Use: 5  Transfer (Bed to Chair and Back): 10  Total: 65/100       The Barthel ADL Index: Guidelines  1. The index should be used as a record of what a patient does, not as a record of what a patient could do. 2. The main aim is to establish degree of independence from any help, physical or verbal, however minor and for whatever reason. 3. The need for supervision renders the patient not independent. 4. A patient's performance should be established using the best available evidence. Asking the patient, friends/relatives and nurses are the usual sources, but direct observation and common sense are also important. However direct testing is not needed. 5. Usually the patient's performance over the preceding 24-48 hours is important, but occasionally longer periods will be relevant. 6. Middle categories imply that the patient supplies over 50 per cent of the effort. 7. Use of aids to be independent is allowed. Rolf Wilkinson., Barthel, D.W. ([id number]). Functional evaluation: the Barthel Index. [address] [facility] (14)2. FRANKLIN Montalvo, Kulwant Ramon., Raquel Gutierrez., [place], [address] [place] [address] (). Measuring the change indisability after inpatient rehabilitation; comparison of the responsiveness of the Barthel Index and Functional [place] Measure. Journal of Neurology, Neurosurgery, and Psychiatry, 66(4), 346-353. Kisha Ozuna, N.J.A, SUDHIR Sellers, & Beulah Santana M.A. (2004.) Assessment of post-stroke quality of life in cost-effectiveness studies: The usefulness of the Barthel Index and the EuroQoL-5D.  Quality of Life Research, 13, 212-91      Physical Therapy Evaluation Charge Determination   History Examination Presentation Decision-Making   HIGH Complexity :3+ comorbidities / personal factors will impact the outcome/ POC  MEDIUM Complexity : 3 Standardized tests and measures addressing body structure, function, activity limitation and / or participation in recreation  LOW Complexity : Stable, uncomplicated  Other outcome measures Barthel Index 65/100  MEDIUM      Based on the above components, the patient evaluation is determined to be of the following complexity level: LOW     Pain Ratin/10    Activity Tolerance:   Fair and SpO2 stable on RA    After treatment patient left in no apparent distress:   Sitting in chair, Call bell within reach and Caregiver / family present    COMMUNICATION/EDUCATION:   The patients plan of care was discussed with: Registered nurse, Physician and family. Fall prevention education was provided and the patient/caregiver indicated understanding., Patient/family have participated as able in goal setting and plan of care. and Patient/family agree to work toward stated goals and plan of care.     Thank you for this referral.  Mary Ann Wade, PT   Time Calculation: 44 mins

## 2021-06-08 NOTE — PROGRESS NOTES
GI PROGRESS NOTE  Jannamauricio Martin, NP  [phone] NP in-hospital cell phone M-F until 4:30  After 5pm or on weekends, please call  for physician on call    Mercedez Ji :1945 EAN:[id number]   ATTG: Dr Nena Ortega  PCP: Tammy Rivero MD  Date/Time:  2021 11:38 AM     Primary GI: Dr. Marquita Saunders - Dr Kacey Carrasco covering  Reason for following: anemia    Assessment:    Acute on Chronic anemia without [place] Loss  ESRD on HD  - Hgb on admission 6.0, s/p 1 unit 7.0. Today hgb at 7.2  - EGD 17: mild duodenitits only, pathology pending  - CLN : Difficult to complete, incomplete due to use of upper scope  - Denies dizziness today    HTN  Hx of CVA on Aggrenox  DMII      Plan:   · No plan for repeat colonoscopy during inpatient stay, recommend outpatient CT colonography. · Trend H/H - transfuse PRN  · Agree with IV iron given  · Continue PPI indefinitely  · Follow pathology. Nothing further to add. Call if acute change. Will sign off. Follow up outpatient with us. Subjective:   Discussed with RN events overnight. Sitting on edge of bed with therapy, denies complaints. Short term memory loss though. Spouse at bedside who we spoke to and updated with plan of care. He is agreeable. Complaint Y/N Description   Abdominal Pain n    Hematemesis n    Hematochezia n    Melena n    Constipation n    Diarrhea n    Dyspepsia n    Dysphagia n    Jaundiced n    Nausea/vomiting n      Review of Systems:  Symptom Y/N Comments  Symptom Y/N Comments   Fever/Chills    Chest Pain     Cough    Headaches     Sputum    Joint Pain     SOB/KWON    Pruritis/Rash     Tolerating Diet y   Other       Could NOT obtain due to:      Objective:   VITALS:   Last 24hrs VS reviewed since prior progress note.  Most recent are:  Visit Vitals  BP (!) 111/48   Pulse 73   Temp 97.4 °F (36.3 °C)   Resp 18   Ht 5' 6\" (1.676 m)   Wt 66.2 kg (146 lb)   SpO2 97%   Breastfeeding No   BMI 23.57 kg/m²       Intake/Output Summary (Last 24 hours) at 6/8/2021 1138  Last data filed at 6/7/2021 1800  Gross per 24 hour   Intake --   Output 2000 ml   Net -2000 ml     PHYSICAL EXAM:  General: WD, WN. Alert, cooperative, no acute distress    HEENT: NC, Atraumatic. Anicteric sclerae. Lungs:  CTA Bilaterally. No Wheezing/Rhonchi/Rales. Heart:  Regular  rhythm,  No murmur (-), No Rubs, No Gallops  Abdomen: Soft, Non distended, Non tender.  +Bowel sounds, no HSM  Extremities: Trace BLE edema  Neurologic:  Alert and oriented X self and place, not time. No acute neurological distress   Psych:   Unclear insight. Not anxious nor agitated. Lab and Radiology Data Reviewed: (see below)    Medications Reviewed: (see below)  PMH/SH reviewed - no change compared to H&P  ________________________________________________________________________  Total time spent with patient: 20 minutes ________________________________________________________________________  Care Plan discussed with:  Patient y   Family  y - spouse   RN               Consultant: [place] NP     Procedures: see electronic medical records for all procedures/Xrays and details which were not copied into this note but were reviewed prior to creation of Plan. LABS:  Recent Labs     06/07/21  0140 06/06/21  1330 06/06/21  0309   WBC 10.6  --  9.8   HGB 7.2* 6.8* 7.0*   HCT 22.6* 20.8* 21.9*     --  251     Recent Labs     06/07/21  0140 06/06/21  0309 06/05/21  1834    138 136   K 4.3 4.0 4.9    102 100   CO2 27 30 31   BUN 69* 65* 61*   CREA 6.01* 5.16* 4.88*   GLU 75 74 115*   CA 8.1* 8.4* 8.5     Recent Labs     06/06/21  0309 06/05/21  1834   AP 92 92   TP 6.0* 6.3*   ALB 3.0* 3.1*   GLOB 3.0 3.2     No results for input(s): INR, PTP, APTT, INREXT in the last 72 hours.    Recent Labs     06/07/21  0140 06/06/21  1605 06/06/21  0309   TIBC 302 315  --    PSAT 8* 12*  --    FERR 886*  --  815*      Lab Results   Component Value Date/Time    Folate 11.2 06/06/2021 03:09 AM     No results for input(s): PH, PCO2, PO2 in the last 72 hours.   Recent Labs     06/06/21  0309   CPK 77   CKMB <1.0     Lab Results   Component Value Date/Time    Color YELLOW/STRAW 11/13/2019 07:37 PM    Appearance CLEAR 11/13/2019 07:37 PM    Specific gravity 1.010 11/13/2019 07:37 PM    pH (UA) 7.0 11/13/2019 07:37 PM    Protein TRACE (A) 11/13/2019 07:37 PM    Glucose NEGATIVE  11/13/2019 07:37 PM    Ketone NEGATIVE  11/13/2019 07:37 PM    Bilirubin NEGATIVE  11/13/2019 07:37 PM    Urobilinogen 0.2 11/13/2019 07:37 PM    Nitrites NEGATIVE  11/13/2019 07:37 PM    Leukocyte Esterase MODERATE (A) 11/13/2019 07:37 PM    Epithelial cells FEW 11/13/2019 07:37 PM    Bacteria 1+ (A) 11/13/2019 07:37 PM    WBC 20-50 11/13/2019 07:37 PM    RBC 0-5 11/13/2019 07:37 PM       MEDICATIONS:  Current Facility-Administered Medications   Medication Dose Route Frequency    sodium chloride (NS) flush 5-40 mL  5-40 mL IntraVENous Q8H    sodium chloride (NS) flush 5-40 mL  5-40 mL IntraVENous PRN    glucose chewable tablet 16 g  4 Tablet Oral PRN    dextrose (D50W) injection syrg 12.5-25 g  25-50 mL IntraVENous PRN    glucagon (GLUCAGEN) injection 1 mg  1 mg IntraMUSCular PRN    insulin lispro (HUMALOG) injection   SubCUTAneous AC&HS    [Held by provider] heparin (porcine) injection 5,000 Units  5,000 Units SubCUTAneous Q8H    ALPRAZolam (XANAX) tablet 0.25 mg  0.25 mg Oral QID PRN    amLODIPine (NORVASC) tablet 10 mg  10 mg Oral DAILY    [Held by provider] aspirin-dipyridamole (AGGRENOX)  mg per capsule 1 Capsule  1 Capsule Oral BID    atorvastatin (LIPITOR) tablet 40 mg  40 mg Oral DAILY    cloNIDine HCL (CATAPRES) tablet 0.1 mg  0.1 mg Oral BID    DULoxetine (CYMBALTA) capsule 60 mg  60 mg Oral DAILY    cyanocobalamin (VITAMIN B12) tablet 1,000 mcg  1,000 mcg Oral DAILY    pantoprazole (PROTONIX) 40 mg in 0.9% sodium chloride 10 mL injection  40 mg IntraVENous Q12H    insulin glargine (LANTUS) injection 10 Units  10 Units SubCUTAneous DAILY    sevelamer carbonate (RENVELA) tab 1,600 mg  1,600 mg Oral TID WITH MEALS    epoetin lucina-epbx (RETACRIT) injection 20,000 Units  20,000 Units SubCUTAneous Q MON, WED & FRI    0.9% sodium chloride infusion 250 mL  250 mL IntraVENous PRN    sodium chloride (NS) flush 5-40 mL  5-40 mL IntraVENous Q8H    sodium chloride (NS) flush 5-40 mL  5-40 mL IntraVENous PRN    acetaminophen (TYLENOL) tablet 650 mg  650 mg Oral Q6H PRN    Or    acetaminophen (TYLENOL) suppository 650 mg  650 mg Rectal Q6H PRN    polyethylene glycol (MIRALAX) packet 17 g  17 g Oral DAILY    bisacodyL (DULCOLAX) tablet 5 mg  5 mg Oral DAILY PRN    promethazine (PHENERGAN) tablet 12.5 mg  12.5 mg Oral Q6H PRN    Or    ondansetron (ZOFRAN) injection 4 mg  4 mg IntraVENous Q6H PRN

## 2021-06-08 NOTE — PROGRESS NOTES
Problem: Falls - Risk of  Goal: *Absence of Falls  Description: Document [place] Fall Risk and appropriate interventions in the flowsheet. Outcome: Progressing Towards Goal  Note: Fall Risk Interventions:  Mobility Interventions: Communicate number of staff needed for ambulation/transfer, Patient to call before getting OOB    Mentation Interventions: Increase mobility, Reorient patient    Medication Interventions: Bed/chair exit alarm    Elimination Interventions: Call light in reach, Patient to call for help with toileting needs              Problem: Patient Education: Go to Patient Education Activity  Goal: Patient/Family Education  Outcome: Progressing Towards Goal   Patient is alert and oriented with intermittent confusion. Patient states that she becomes more confused after having dialysis and she had dialysis 6/7. She was given a dinner meal from the floor along with snack box d/t not eating all day. Initial pm bs was 67; however after eating her bs was 102. She remained alert and oriented x 2 during hypoglycemic episode. She was able to swallow and therefore was given soda and a meal along with the snack box. No acute distress noted.

## 2021-06-08 NOTE — INTERDISCIPLINARY ROUNDS
Interdisciplinary Rounds were completed on 06/08/21 for this patient. Rounds included nursing, clinical care leader, pharmacy, and case management. Plan of care discussed. See clinical pathway and/or care plan for interventions and desired outcomes.

## 2021-06-08 NOTE — PROGRESS NOTES
Physical Therapy:  Orders received, chart reviewed and evaluation completed. Patient completes bed mobility with supervision, sit<>stand CGA, and ambulation with CGA using walker. Patient and spouse report she is near baseline functioning status, has DME needed for d/c. [facility]. Formal evaluation to follow.      April Alpha Gabriela PT, DPT

## 2021-06-08 NOTE — PROGRESS NOTES
[facility] PROGRESS NOTE UPDATE:       FINAL PATHOLOGIC DIAGNOSIS   Duodenum; biopsy:   Small intestine mucosa with mild active duodenitis and mild gastric foveolar metaplasia. No increased intraepithelial lymphocytes, no significant villous blunting/atrophy and no dysplasia noted. - Continue PPI for duodenitis. No other issues seen. - Will continue with plan as detailed in full progress note.      Thank you,   Leonardo Galeana.

## 2021-06-08 NOTE — PROGRESS NOTES
End of Shift Note    Bedside shift change report given to Roger (oncoming nurse) by Alma Gan RN (offgoing nurse). Report included the following information SBAR, Kardex, Intake/Output, MAR and Recent Results    Shift worked:  7a-7p     Shift summary and any significant changes:     Pt down to EGD this AM, no active bleed found. Pt then off floor to dialysis. Assisted to bedside commode to void     Concerns for physician to address:  none     Zone phone for oncoming shift:   [phone]       Activity:  Activity Level: Up with Assistance  Number times ambulated in hallways past shift: 0  Number of times OOB to chair past shift: 0    Cardiac:   Cardiac Monitoring: Yes      Cardiac Rhythm: Sinus Leon    Access:   Current line(s): PIV and HD access     Genitourinary:   Urinary status: voiding    Respiratory:   O2 Device: None (Room air)  Chronic home O2 use?: NO  Incentive spirometer at bedside: NO     GI:  Last Bowel Movement Date: 06/06/21  Current diet:  ADULT DIET Regular; Low Potassium (Less than 3000 mg/day); Low Phosphorus (Less than 1000 mg)  Passing flatus: YES  Tolerating current diet: YES       Pain Management:   Patient states pain is manageable on current regimen: YES    Skin:  Chance Score: 20  Interventions: float heels and increase time out of bed    Patient Safety:  Fall Score:  Total Score: 3  Interventions: bed/chair alarm, gripper socks, pt to call before getting OOB and stay with me (per policy)  High Fall Risk: Yes    Length of Stay:  Expected LOS: 3d 14h  Actual LOS: 1      Alma Gan RN

## 2021-06-08 NOTE — PROGRESS NOTES
Nephrology Progress Note  Mitch Eddy     [facility]. [facility]  Phone - [phone]   Patient: Alvis Klinefelter    YOB: 1945        Date- 6/8/2021   Admit Date: 6/5/2021  CC: Follow up for ESRD          IMPRESSION & PLAN:     ESRD- [facility] - mwf   Anemia of ckd   Hypertension   GI bleed   Sec. hyperpara   H/o prolonged bleeding at hd access site-    PLAN-   Plan for HD tomorrow if still inpatient.  Hgb still labile, might need PRBC if < 7   Continue norvasc and clonidine   epogen for anemia     Subjective: Interval History:   -  Feels better  -  EGD showed dudenitis    Objective:   Vitals:    06/08/21 0000 06/08/21 0348 06/08/21 0400 06/08/21 0826   BP:  (!) 133/44  (!) 111/48   Pulse: 71 78 78 73   Resp:  18  18   Temp:  98.3 °F (36.8 °C)  97.4 °F (36.3 °C)   SpO2:  97%  97%   Weight:       Height:          06/07 0701 - 06/08 0700  In: 100 [I.V.:100]  Out: 2000   Last 3 Recorded Weights in this Encounter    06/05/21 1623 06/06/21 1736 06/07/21 0932   Weight: 66.7 kg (147 lb) 66.5 kg (146 lb 11.2 oz) 66.2 kg (146 lb)      Physical exam:   GEN: NAD  NECK- Supple, no mass  RESP: No wheezing, Clear b/l  CVS: S1,S2  RRR  NEURO: Normal speech, Non focal  EXT: trace Edema   PSYCH: Normal Mood  Left arm av graft bruit +    Chart reviewed. Pertinent Notes reviewed.      Data Review :  Recent Labs     06/07/21  0140 06/06/21  0309 06/05/21  1834    138 136   K 4.3 4.0 4.9    102 100   CO2 27 30 31   BUN 69* 65* 61*   CREA 6.01* 5.16* 4.88*   GLU 75 74 115*   CA 8.1* 8.4* 8.5     Recent Labs     06/07/21  0140 06/06/21  1330 06/06/21  0309 06/05/21  1834   WBC 10.6  --  9.8 9.6   HGB 7.2* 6.8* 7.0* 6.0*   HCT 22.6* 20.8* 21.9* 18.6*     --  251 254     Recent Labs     06/07/21  0140 06/06/21  1605 06/06/21  0309   TIBC 302 315  --    PSAT 8* 12*  --    FERR 886*  --  815*      Medication list  reviewed  Current Facility-Administered Medications   Medication Dose Route Frequency    sodium chloride (NS) flush 5-40 mL  5-40 mL IntraVENous Q8H    sodium chloride (NS) flush 5-40 mL  5-40 mL IntraVENous PRN    glucose chewable tablet 16 g  4 Tablet Oral PRN    dextrose (D50W) injection syrg 12.5-25 g  25-50 mL IntraVENous PRN    glucagon (GLUCAGEN) injection 1 mg  1 mg IntraMUSCular PRN    insulin lispro (HUMALOG) injection   SubCUTAneous AC&HS    [Held by provider] heparin (porcine) injection 5,000 Units  5,000 Units SubCUTAneous Q8H    ALPRAZolam (XANAX) tablet 0.25 mg  0.25 mg Oral QID PRN    amLODIPine (NORVASC) tablet 10 mg  10 mg Oral DAILY    [Held by provider] aspirin-dipyridamole (AGGRENOX)  mg per capsule 1 Capsule  1 Capsule Oral BID    atorvastatin (LIPITOR) tablet 40 mg  40 mg Oral DAILY    cloNIDine HCL (CATAPRES) tablet 0.1 mg  0.1 mg Oral BID    DULoxetine (CYMBALTA) capsule 60 mg  60 mg Oral DAILY    cyanocobalamin (VITAMIN B12) tablet 1,000 mcg  1,000 mcg Oral DAILY    pantoprazole (PROTONIX) 40 mg in 0.9% sodium chloride 10 mL injection  40 mg IntraVENous Q12H    insulin glargine (LANTUS) injection 10 Units  10 Units SubCUTAneous DAILY    sevelamer carbonate (RENVELA) tab 1,600 mg  1,600 mg Oral TID WITH MEALS    epoetin lucina-epbx (RETACRIT) injection 20,000 Units  20,000 Units SubCUTAneous Q MON, WED & FRI    0.9% sodium chloride infusion 250 mL  250 mL IntraVENous PRN    sodium chloride (NS) flush 5-40 mL  5-40 mL IntraVENous Q8H    sodium chloride (NS) flush 5-40 mL  5-40 mL IntraVENous PRN    acetaminophen (TYLENOL) tablet 650 mg  650 mg Oral Q6H PRN    Or    acetaminophen (TYLENOL) suppository 650 mg  650 mg Rectal Q6H PRN    polyethylene glycol (MIRALAX) packet 17 g  17 g Oral DAILY    bisacodyL (DULCOLAX) tablet 5 mg  5 mg Oral DAILY PRN    promethazine (PHENERGAN) tablet 12.5 mg  12.5 mg Oral Q6H PRN    Or    ondansetron (ZOFRAN) injection 4 mg  4 mg IntraVENous Q6H PRN          Darling Basque Tamica Campos, [address] [facility]  [facility] / SATYA AND GINA [facility], [address]  [place], [address]  Phone - [phone]               Fax - [phone]

## 2021-06-09 VITALS
TEMPERATURE: 97.7 F | SYSTOLIC BLOOD PRESSURE: 121 MMHG | BODY MASS INDEX: 23.46 KG/M2 | RESPIRATION RATE: 18 BRPM | HEART RATE: 74 BPM | OXYGEN SATURATION: 100 % | WEIGHT: 145.94 LBS | DIASTOLIC BLOOD PRESSURE: 60 MMHG | HEIGHT: 66 IN

## 2021-06-09 LAB
ANION GAP SERPL CALC-SCNC: 5 MMOL/L (ref 5–15)
BUN SERPL-MCNC: 54 MG/DL (ref 6–20)
BUN/CREAT SERPL: 10 (ref 12–20)
CALCIUM SERPL-MCNC: 8.1 MG/DL (ref 8.5–10.1)
CHLORIDE SERPL-SCNC: 99 MMOL/L (ref 97–108)
CO2 SERPL-SCNC: 31 MMOL/L (ref 21–32)
CREAT SERPL-MCNC: 5.48 MG/DL (ref 0.55–1.02)
ERYTHROCYTE [DISTWIDTH] IN BLOOD BY AUTOMATED COUNT: 16.2 % (ref 11.5–14.5)
GLUCOSE BLD STRIP.AUTO-MCNC: 103 MG/DL (ref 65–117)
GLUCOSE BLD STRIP.AUTO-MCNC: 62 MG/DL (ref 65–117)
GLUCOSE BLD STRIP.AUTO-MCNC: 82 MG/DL (ref 65–117)
GLUCOSE SERPL-MCNC: 88 MG/DL (ref 65–100)
HCT VFR BLD AUTO: 22.5 % (ref 35–47)
HGB BLD-MCNC: 7.3 G/DL (ref 11.5–16)
MCH RBC QN AUTO: 29.8 PG (ref 26–34)
MCHC RBC AUTO-ENTMCNC: 32.4 G/DL (ref 30–36.5)
MCV RBC AUTO: 91.8 FL (ref 80–99)
NRBC # BLD: 0 K/UL (ref 0–0.01)
NRBC BLD-RTO: 0 PER 100 WBC
PLATELET # BLD AUTO: 221 K/UL (ref 150–400)
PMV BLD AUTO: 11.3 FL (ref 8.9–12.9)
POTASSIUM SERPL-SCNC: 4 MMOL/L (ref 3.5–5.1)
RBC # BLD AUTO: 2.45 M/UL (ref 3.8–5.2)
SERVICE CMNT-IMP: ABNORMAL
SERVICE CMNT-IMP: NORMAL
SERVICE CMNT-IMP: NORMAL
SODIUM SERPL-SCNC: 135 MMOL/L (ref 136–145)
WBC # BLD AUTO: 7.6 K/UL (ref 3.6–11)

## 2021-06-09 PROCEDURE — 36415 COLL VENOUS BLD VENIPUNCTURE: CPT

## 2021-06-09 PROCEDURE — 74011250637 HC RX REV CODE- 250/637: Performed by: INTERNAL MEDICINE

## 2021-06-09 PROCEDURE — 94760 N-INVAS EAR/PLS OXIMETRY 1: CPT

## 2021-06-09 PROCEDURE — 82962 GLUCOSE BLOOD TEST: CPT

## 2021-06-09 PROCEDURE — 94761 N-INVAS EAR/PLS OXIMETRY MLT: CPT

## 2021-06-09 PROCEDURE — 90935 HEMODIALYSIS ONE EVALUATION: CPT

## 2021-06-09 PROCEDURE — 80048 BASIC METABOLIC PNL TOTAL CA: CPT

## 2021-06-09 PROCEDURE — 85027 COMPLETE CBC AUTOMATED: CPT

## 2021-06-09 RX ORDER — TORSEMIDE 100 MG/1
100 TABLET ORAL DAILY
Qty: 10 TABLET | Refills: 0 | Status: SHIPPED
Start: 2021-06-09

## 2021-06-09 RX ORDER — PANTOPRAZOLE SODIUM 40 MG/1
40 TABLET, DELAYED RELEASE ORAL 2 TIMES DAILY
Qty: 120 TABLET | Refills: 0 | Status: SHIPPED | OUTPATIENT
Start: 2021-06-09

## 2021-06-09 RX ADMIN — Medication 10 ML: at 14:30

## 2021-06-09 RX ADMIN — Medication 10 ML: at 14:29

## 2021-06-09 RX ADMIN — Medication 10 ML: at 05:41

## 2021-06-09 RX ADMIN — SEVELAMER CARBONATE 1600 MG: 800 TABLET, FILM COATED ORAL at 14:29

## 2021-06-09 NOTE — PROGRESS NOTES
Problem: Falls - Risk of  Goal: *Absence of Falls  Description: Document [place] Fall Risk and appropriate interventions in the flowsheet.   Outcome: Resolved/Met  Note: Fall Risk Interventions:  Mobility Interventions: Communicate number of staff needed for ambulation/transfer, Patient to call before getting OOB    Mentation Interventions: Bed/chair exit alarm, Door open when patient unattended, Increase mobility, More frequent rounding, Reorient patient    Medication Interventions: Bed/chair exit alarm, Patient to call before getting OOB, Teach patient to arise slowly    Elimination Interventions: Call light in reach, Patient to call for help with toileting needs, Stay With Me (per policy)              Problem: Patient Education: Go to Patient Education Activity  Goal: Patient/Family Education  Outcome: Resolved/Met     Problem: Patient Education: Go to Patient Education Activity  Goal: Patient/Family Education  Outcome: Resolved/Met

## 2021-06-09 NOTE — PROGRESS NOTES
DISCHARGE NOTE FROM [facility] NURSE    Patient determined to be stable for discharge by attending provider. I have reviewed the discharge instructions and follow-up appointments with the patient and spouse. They verbalized understanding and all questions were answered to their satisfaction. No complaints or further questions were expressed. Medications sent to pharmacy. Appropriate educational materials and medication side effect teaching were provided. PIV and cardiac monitoring were removed prior to discharge. Patient did not discharge with any line, scott, or drain. All personal items collected during admission were returned to the patient prior to discharge.     Post-op patient: No      [employer]

## 2021-06-09 NOTE — PROGRESS NOTES
Nephrology Progress Note  Mitch Eddy     [facility]. [facility]  Phone - [phone]   Patient: Purnima Carrasco    YOB: 1945        Date- 6/9/2021   Admit Date: 6/5/2021  CC: Follow up for ESRD          IMPRESSION & PLAN:     ESRD- [facility] - MWF   Anemia of CKD   HTN   GI bleed   Sec. hyperpara   H/o prolonged bleeding at hd access site   Duodenitis    PLAN-   Plan for HD today   Hgb still labile, might need PRBC if < 7   Continue norvasc and clonidine   epogen for anemia     Subjective: Interval History:   -  Feels better, seen on HD  -  EGD showed duodenitis, on medical management    Objective:   Vitals:    06/08/21 2024 06/08/21 2307 06/09/21 0436 06/09/21 0819   BP: (!) 123/36 (!) 119/42 (!) 121/54 (!) 128/56   Pulse: 77 62 79 64   Resp: 18 20 20 18   Temp: 97.9 °F (36.6 °C) 98.5 °F (36.9 °C) 98.7 °F (37.1 °C) 97.7 °F (36.5 °C)   TempSrc:    Oral   SpO2: 100% 100% 100%    Weight:   66.2 kg (145 lb 15.1 oz)    Height:          No intake/output data recorded. Last 3 Recorded Weights in this Encounter    06/06/21 1736 06/07/21 0932 06/09/21 0436   Weight: 66.5 kg (146 lb 11.2 oz) 66.2 kg (146 lb) 66.2 kg (145 lb 15.1 oz)      Physical exam:   GEN: NAD  NECK- Supple, no mass  RESP: No wheezing, Clear b/l  CVS: S1,S2  RRR  NEURO: Normal speech, Non focal  EXT: trace Edema   PSYCH: Normal Mood  Left arm av graft bruit +    Chart reviewed. Pertinent Notes reviewed.      Data Review :  Recent Labs     06/09/21 0427 06/07/21  0140   * 136   K 4.0 4.3   CL 99 101   CO2 31 27   BUN 54* 69*   CREA 5.48* 6.01*   GLU 88 75   CA 8.1* 8.1*     Recent Labs     06/09/21 0427 06/07/21  0140 06/06/21  1330   WBC 7.6 10.6  --    HGB 7.3* 7.2* 6.8*   HCT 22.5* 22.6* 20.8*    261  --      Recent Labs     06/07/21  0140 06/06/21  1605   TIBC 302 315   PSAT 8* 12*   FERR 886*  --       Medication list  reviewed  Current Facility-Administered Medications Medication Dose Route Frequency    pantoprazole (PROTONIX) tablet 40 mg  40 mg Oral BID    sodium chloride (NS) flush 5-40 mL  5-40 mL IntraVENous Q8H    sodium chloride (NS) flush 5-40 mL  5-40 mL IntraVENous PRN    glucose chewable tablet 16 g  4 Tablet Oral PRN    dextrose (D50W) injection syrg 12.5-25 g  25-50 mL IntraVENous PRN    glucagon (GLUCAGEN) injection 1 mg  1 mg IntraMUSCular PRN    insulin lispro (HUMALOG) injection   SubCUTAneous AC&HS    [Held by provider] heparin (porcine) injection 5,000 Units  5,000 Units SubCUTAneous Q8H    ALPRAZolam (XANAX) tablet 0.25 mg  0.25 mg Oral QID PRN    amLODIPine (NORVASC) tablet 10 mg  10 mg Oral DAILY    [Held by provider] aspirin-dipyridamole (AGGRENOX)  mg per capsule 1 Capsule  1 Capsule Oral BID    atorvastatin (LIPITOR) tablet 40 mg  40 mg Oral DAILY    cloNIDine HCL (CATAPRES) tablet 0.1 mg  0.1 mg Oral BID    DULoxetine (CYMBALTA) capsule 60 mg  60 mg Oral DAILY    cyanocobalamin (VITAMIN B12) tablet 1,000 mcg  1,000 mcg Oral DAILY    insulin glargine (LANTUS) injection 10 Units  10 Units SubCUTAneous DAILY    sevelamer carbonate (RENVELA) tab 1,600 mg  1,600 mg Oral TID WITH MEALS    epoetin lucina-epbx (RETACRIT) injection 20,000 Units  20,000 Units SubCUTAneous Q MON, WED & FRI    0.9% sodium chloride infusion 250 mL  250 mL IntraVENous PRN    sodium chloride (NS) flush 5-40 mL  5-40 mL IntraVENous Q8H    sodium chloride (NS) flush 5-40 mL  5-40 mL IntraVENous PRN    acetaminophen (TYLENOL) tablet 650 mg  650 mg Oral Q6H PRN    Or    acetaminophen (TYLENOL) suppository 650 mg  650 mg Rectal Q6H PRN    polyethylene glycol (MIRALAX) packet 17 g  17 g Oral DAILY    bisacodyL (DULCOLAX) tablet 5 mg  5 mg Oral DAILY PRN    promethazine (PHENERGAN) tablet 12.5 mg  12.5 mg Oral Q6H PRN    Or    ondansetron (ZOFRAN) injection 4 mg  4 mg IntraVENous Q6H PRN          Jovita Anguiano MD              [facility] [address]av [address]oll[address] / [address], [address] Emmanuellynn [place], [address]  Phone - [phone]               Fax - [phone]

## 2021-06-09 NOTE — PROCEDURES
[facility] D[facility] Team [facility]  [phone]    Vitals   Pre   Post   Assessment   Pre   Post     Temp  Temp: 97.7 °F (36.5 °C) (06/09/21 0819)  97.7 LOC  A&Ox3 A&Ox3   HR   Pulse (Heart Rate): 64 (06/09/21 0819) 72 Lungs   Dim bases clear   B/P   BP: (!) 128/56 (06/09/21 0819) 153/62 (anxious) Cardiac   Tele, NSR Tele, NSR   Resp   Resp Rate: 18 (06/09/21 0819) 18 Skin   CDI CDI   Pain level  Pain Intensity 1: 0 (06/08/21 2031) 0 Edema  none none   Orders:    Duration:   Start:    6133 End:    1150 Total:   3.5hr   Dialyzer:   Dialyzer/Set Up Inspection: Jackline Litten (06/09/21 0819)   K Bath:   Dialysate K (mEq/L): 3 (06/09/21 0819)   Ca Bath:   Dialysate CA (mEq/L): 2.5 (06/09/21 0819)   Na/Bicarb:   Dialysate NA (mEq/L): 140 (06/09/21 0819)   Target Fluid Removal:   Goal/Amount of Fluid to Remove (mL): 2000 mL (06/09/21 0819)   Access     Type & Location:   RHYS AVG: skin CDI. No s/s of infection. + B/T. No issues with cannulation or hemostasis. Running well at -450.     Labs     Obtained/Reviewed   Critical Results Called   Date when labs were drawn-  Hgb-    HGB   Date Value Ref Range Status   06/09/2021 7.3 (L) 11.5 - 16.0 g/dL Final     K-    Potassium   Date Value Ref Range Status   06/09/2021 4.0 3.5 - 5.1 mmol/L Final     Ca-   Calcium   Date Value Ref Range Status   06/09/2021 8.1 (L) 8.5 - 10.1 MG/DL Final     Bun-   BUN   Date Value Ref Range Status   06/09/2021 54 (H) 6 - 20 MG/DL Final     Creat-   Creatinine   Date Value Ref Range Status   06/09/2021 5.48 (H) 0.55 - 1.02 MG/DL Final        Medications/ Blood Products Given     Name   Dose   Route and Time     none ordered                Blood Volume Processed (BVP):    77.9L Net Fluid   Removed:  1600ml   Comments   Time Out Done: 8180  Primary Nurse Rpt Pre: Neo Mathis RN  Primary Nurse Rpt Post: Jose Madrigal RN  Pt Education: procedural; voiced understanding  Care Plan: possible D/C  Tx Summary:  Pt arrived to HD suite A&Ox4. Consent signed & on file. SBAR received from Primary RN.  6404: Pt cannulated with 93A needles per policy & without issue. VSS. Dialysis Tx initiated. 1105: patient states she doesn't feel right. BP: 128/45; HR 61; RR 18. Called primary RN for BG. UFR off. 100ml NS bolus. No change in status. 1125: BG 82mg/dL. In absence of any other clinic issues, patient does admit to having previous anxiety Dx. Patient reassured. UF back on at decreased rate. Patient feeling better. No further issues. 1150: Tx ended. VSS. All possible blood returned to patient. Hemostasis achieved without issue. Bed locked and in the lowest position, call bell and belongings in reach. SBAR given to Primary, RN. Patient is stable at time of their departure. All Dialysis related medications have been reviewed. Admiting Diagnosis: GI bleed  Pt's previous clinic- [facility]  Consent signed - Informed Consent Verified: Yes (06/09/21 0819)  [facility] Consent - on file  Hepatitis Status- Immune 12/7/2020; portal  Machine #- Machine Number: [id number] (06/09/21 9705)  Telemetry status- Tele, NSR  Pre-dialysis wt. -

## 2021-06-09 NOTE — PROGRESS NOTES
Transition of Care Plan:    RUR:22%  Disposition: Home with [address]   Follow up appointments: Follow up with PCP   DME needed: TBD by therapist   Transportation at [id number]  [facility] will assist with transport   Arlester Passy or means to access home:   Family will have keys      IM Medicare letter: 2nd  Medicare Letter to be provided   Caregiver Contact: Geena Sandoval (spouse) [phone]  Discharge Caregiver contacted prior to discharge? Spouse to be contacted    CM made aware that pt will be d/c on today and will transition home with family with recommendations of [address]. CM contact pt's, via room phone and spoke with pt's spouse. Pt's agreeable to [address] being arranged at the time of d/c.  CM informed spouse that referrals will be made to agencies that are in network with pt's insurance. CM sent referral to [facility], and was accepted. .  CM completed the needs of the pt at this time.     Sylvie Hood, MSW, [address] [place] [address]

## 2021-06-09 NOTE — PROGRESS NOTES
Hospitalist Progress Note    NAME: Purnima Carrasco   :  1945   MRN:  [id number]       Assessment / Plan:  Acute on chronic anemia likely due to renal failure and GI bleed  -EGD noted  -Moderate duodenitis, biopsies taken, pending  -Continue PPI  -Pt on aggranox PTA for CVA prevention, currently on hold   -s/p tranfusion 1unit RBCs /5  -S/p IV iron  -no plan for colonoscopy as per GI  -monitor H/H, transfuse for Hb < 7  -resuem aggrenox for CVA prevention when ok with GI  PT/OT to eval     Right jaw pain etiology unclear POA, resolved  Cardiac enzymes negative. EKG with inferolateral T wave inversion, but this is unchanged from prior EKGs    ESRD  Nephro for HD, MWF     Diabetes mellitus type 2 on insulin, nephropathy POA  -on lispro protamine/lispro 75-25 28 units bid at home +piogliazone  -cont low dose lantus and SSI  -BS high today and will give additional lantus dose     Essential hypertension POA  History of stroke  Coronary artery disease POA in record, patient denies this  Hyperlipidemia POA  -Hold Aggrenox  -Cont' Norvasc, clonidine, Lipitor  -EKG with inferolateral TWI, this is unchanged from several years ago     Anxiety depression  -Continue Xanax as needed, Cymbalta     Overweight by definition Body mass index is 23.57 kg/m².     Estimated discharge date:   Barriers:      Code status: Full  Prophylaxis: SCD's  Recommended Disposition: Home w/Family     Subjective:     Chief Complaint / Reason for Physician Visit  No new complaints. Denies melena, brbpr, abdominal pain    Discussed with RN events overnight.      Review of Systems:  Symptom Y/N Comments  Symptom Y/N Comments   Fever/Chills n   Chest Pain n    Poor Appetite n   Edema     Cough n   Abdominal Pain n    Sputum n   Joint Pain     SOB/KWON n   Pruritis/Rash     Nausea/vomit n   Tolerating PT/OT     Diarrhea n   Tolerating Diet     Constipation n   Other       Could NOT obtain due to:      Objective:     VITALS:   Last 24hrs VS reviewed since prior progress note. Most recent are:  Patient Vitals for the past 24 hrs:   Temp Pulse Resp BP SpO2   06/08/21 2024 97.9 °F (36.6 °C) 77 18 (!) 123/36 100 %   06/08/21 1148 97.8 °F (36.6 °C) 68 18 (!) 114/53 100 %   06/08/21 1015 -- 74 -- (!) 151/71 --   06/08/21 0826 97.4 °F (36.3 °C) 73 18 (!) 111/48 97 %   06/08/21 0400 -- 78 -- -- --   06/08/21 0348 98.3 °F (36.8 °C) 78 18 (!) 133/44 97 %   06/08/21 0000 -- 71 -- -- --   06/07/21 2346 98.7 °F (37.1 °C) 71 16 (!) 117/37 96 %     No intake or output data in the 24 hours ending 06/08/21 2141     I had a face to face encounter and independently examined this patient on 6/8/2021, as outlined below:  PHYSICAL EXAM:  General: WD, WN. Alert, cooperative, no acute distress    EENT:  EOMI. Anicteric sclerae. MMM  Resp:  CTA bilaterally, no wheezing or rales. No accessory muscle use  CV:  Regular  rhythm,  No edema  GI:  Soft, Non distended, Non tender. +Bowel sounds  Neurologic:  Alert and oriented X 3, normal speech,   Psych:   Not anxious nor agitated  Skin:  No rashes. No jaundice    Reviewed most current lab test results and cultures  YES  Reviewed most current radiology test results   YES  Review and summation of old records today    NO  Reviewed patient's current orders and MAR    YES  PMH/SH reviewed - no change compared to H&P  ________________________________________________________________________  Care Plan discussed with:    Comments   Patient x    Family      RN x    Care Manager     Consultant                        Multidiciplinary team rounds were held today with , nursing, pharmacist and clinical coordinator. Patient's plan of care was discussed; medications were reviewed and discharge planning was addressed.      ________________________________________________________________________  Total NON critical care TIME: 30  Minutes    Total CRITICAL CARE TIME Spent:   Minutes non procedure based      Comments   >50% of visit spent in counseling and coordination of care x    ________________________________________________________________________  Junaid Sharp DO     Procedures: see electronic medical records for all procedures/Xrays and details which were not copied into this note but were reviewed prior to creation of Plan. LABS:  I reviewed today's most current labs and imaging studies.   Pertinent labs include:  Recent Labs     06/07/21  0140 06/06/21  1330 06/06/21  0309   WBC 10.6  --  9.8   HGB 7.2* 6.8* 7.0*   HCT 22.6* 20.8* 21.9*     --  251     Recent Labs     06/07/21  0140 06/06/21  0309    138   K 4.3 4.0    102   CO2 27 30   GLU 75 74   BUN 69* 65*   CREA 6.01* 5.16*   CA 8.1* 8.4*   ALB  --  3.0*   TBILI  --  0.6   ALT  --  23       Signed: Junaid Sharp DO

## 2021-06-09 NOTE — PROGRESS NOTES
End of Shift Note    Bedside shift change report given to [address] [place] [address] (oncoming nurse) by QUINN Rodriguez (offgoing nurse). Report included the following information SBAR, Kardex, ED Summary, Intake/Output, MAR and Recent Results    Shift worked:  1121-7832     Shift summary and any significant changes:     Pt tolerated care throughout the shift. Pt had no complaints of pain. Pt confused about why she was in the hospital. Pt did not want to get up to go to the bathroom this shift and requested the bed pan as she was tired and felt weak. Pt turned herself well. Concerns for physician to address:  none     Zone phone for oncoming shift:          Activity:  Activity Level: Up with Assistance  Number times ambulated in hallways past shift: 0  Number of times OOB to chair past shift: 0    Cardiac:   Cardiac Monitoring: Yes      Cardiac Rhythm: Sinus Rhythm    Access:   Current line(s): PIV     Genitourinary:   Urinary status: voiding    Respiratory:   O2 Device: None (Room air)  Chronic home O2 use?: NO  Incentive spirometer at bedside: NO     GI:  Last Bowel Movement Date: 06/08/21  Current diet:  ADULT DIET Regular; Low Potassium (Less than 3000 mg/day); Low Phosphorus (Less than 1000 mg)  Passing flatus: YES  Tolerating current diet: YES       Pain Management:   Patient states pain is manageable on current regimen: YES    Skin:  Chance Score: 21  Interventions: turn team, float heels, increase time out of bed and PT/OT consult    Patient Safety:  Fall Score:  Total Score: 4  Interventions: bed/chair alarm, gripper socks, pt to call before getting OOB and stay with me (per policy)  High Fall Risk: Yes    Length of Stay:  Expected LOS: 3d 14h  Actual LOS: [address], GN

## 2021-06-09 NOTE — PROGRESS NOTES
HD TRANSFER - OUT REPORT:    Verbal report given to [facility] on Dot Guerra being transferred to [facility] for routine progression of care       Report consisted of patient's Situation, Background, Assessment and   Recommendations(SBAR). Information from the following report(s) SBAR, Procedure Summary, Intake/Output and Recent Results was reviewed with the receiving nurse. Method:  $$ Method: Hemodialysis (06/09/21 8011)    Fluid Removed  NET Fluid Removed (mL): 1600 ml (06/09/21 1150)     Patient response to treatment:  Stable    End Time  Hemodialysis End Time: 1150 (06/09/21 1150)  If not documented, dialysis nurse to update post-dialysis row in HD/Filtration flowsheet     Medications /Volume expansion agents or Fluid boluses administered during treatment? yes    Post-dialysis medication administration due?  yes  Remind nurse to administer post-HD medication upon return to unit. Fistula hemostasis? yes    Line heparinization? no    Lines: RHYS AVG    Opportunity for questions and clarification was provided.       Patient transported with: [employer]

## 2021-06-09 NOTE — PROGRESS NOTES
End of Shift Note    Bedside shift change report given to Ashley Albert (oncoming nurse) by Eber Rhodes RN (offgoing nurse). Report included the following information SBAR, Kardex, Intake/Output, MAR and Recent Results    Shift worked:  7a-7p     Shift summary and any significant changes:     ambulated pt to bathroom throughout shift. Pt also received insulin coverage     Concerns for physician to address:  none     Zone phone for oncoming shift:   [phone]       Activity:  Activity Level: Up with Assistance  Number times ambulated in hallways past shift: 0  Number of times OOB to chair past shift: 2    Cardiac:   Cardiac Monitoring: Yes      Cardiac Rhythm: Sinus Rhythm    Access:   Current line(s): PIV and HD access     Genitourinary:   Urinary status: voiding    Respiratory:   O2 Device: None (Room air)  Chronic home O2 use?: NO  Incentive spirometer at bedside: NO     GI:  Last Bowel Movement Date: 06/08/21  Current diet:  ADULT DIET Regular; Low Potassium (Less than 3000 mg/day); Low Phosphorus (Less than 1000 mg)  Passing flatus: YES  Tolerating current diet: YES       Pain Management:   Patient states pain is manageable on current regimen: YES    Skin:  Chance Score: 21  Interventions: turn team, float heels and increase time out of bed    Patient Safety:  Fall Score:  Total Score: 4  Interventions: bed/chair alarm, gripper socks, pt to call before getting OOB and stay with me (per policy)  High Fall Risk: Yes    Length of Stay:  Expected LOS: 3d 14h  Actual LOS: 2      Eber Rhodes RN

## 2021-06-09 NOTE — PROGRESS NOTES
Physical Therapy:  Patient is currently [facility] for HD, unavailable for PT treatment session. Will defer and continue to follow.     April Sammie Harrison PT, DPT

## 2021-06-09 NOTE — PROGRESS NOTES
Problem: Falls - Risk of  Goal: *Absence of Falls  Description: Document Caremlla Menjivar Fall Risk and appropriate interventions in the flowsheet.   Outcome: Progressing Towards Goal  Note: Fall Risk Interventions:  Mobility Interventions: Communicate number of staff needed for ambulation/transfer, Patient to call before getting OOB    Mentation Interventions: Door open when patient unattended, Increase mobility    Medication Interventions: Patient to call before getting OOB, Teach patient to arise slowly    Elimination Interventions: Call light in reach, Patient to call for help with toileting needs, Toileting schedule/hourly rounds              Problem: Patient Education: Go to Patient Education Activity  Goal: Patient/Family Education  Outcome: Progressing Towards Goal     Problem: Patient Education: Go to Patient Education Activity  Goal: Patient/Family Education  Outcome: Progressing Towards Goal

## 2021-06-09 NOTE — PROGRESS NOTES
TRANSFER - IN REPORT:    Verbal report received from [place] on [facility]  being received from Gen-Surg for ordered procedure      Report consisted of patients Situation, Background, Assessment and   Recommendations(SBAR). Information from the following report(s) SBAR, Kardex and Cardiac Rhythm NSR was reviewed with the receiving nurse. Opportunity for questions and clarification was provided. Assessment completed upon patients arrival to unit and care assumed.

## 2021-06-10 ENCOUNTER — PATIENT OUTREACH (OUTPATIENT)
Dept: CASE MANAGEMENT | Age: 76
End: 2021-06-10

## 2021-06-10 NOTE — PROGRESS NOTES
Care Transitions Outreach Attempt    Call within 2 business days of discharge: Yes   Attempted to reach patient for transitions of care follow up. Unable to reach patient. Patient: Tessy Rodrigues Patient : 1945 MRN: [id number]    Last Discharge [address]       Complaint Diagnosis Description Type Department Provider    21 Jaw Pain Anemia, unspecified type . .. ED to Hosp-Admission (Discharged) (ADMIT) Janay Galaviz, DO; [address] [facility]. .. Was this an external facility discharge? No     Noted following upcoming appointments from discharge chart review:   [facility] follow up appointment(s): No future appointments. Non-[facility] follow up appointment(s): n/a    Patient does need PCP appt and GI follow up. Also needs outpatient colongraphy. Patient is at dialysis M, W, F. Her  answered and said she is very weak after and naps so he requested a call back another day.            START taking:  pantoprazole (PROTONIX)  CHANGE how you take:  cyanocobalamin  STOP taking:  HYDROcodone-acetaminophen

## 2021-06-14 ENCOUNTER — PATIENT OUTREACH (OUTPATIENT)
Dept: CASE MANAGEMENT | Age: 76
End: 2021-06-14

## 2021-06-22 ENCOUNTER — PATIENT OUTREACH (OUTPATIENT)
Dept: CASE MANAGEMENT | Age: 76
End: 2021-06-22

## 2021-06-23 NOTE — PROGRESS NOTES
06/23/21  Spoke to patients , patient was unavailable, she was napping, he will give patient contact info and see if patient wants to return call. Episode resolved due to unsuccessful 3rd attempt to reach patient and patient has not returned call. Will reopen episode if patient returns call.  AR

## 2021-07-05 NOTE — DISCHARGE SUMMARY
Hospitalist Discharge Summary     Patient ID:  Antonio Meyer  [id number]  70 y.o.  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 Georgette Drew, now sees Dr. Syed Eduardo  History of anemia in past, has required transfusions  Last colonoscopy February 2018 by Dr. Babita Hurtado     Past 2 days intermittent aching pain in her right jaw  No trauma to the area or dental problems  Not changing with opening her mouth  Pointed to her right TMJ area where the pain was  She could not identify any exacerbating or alleviating factors  Currently she is pain-free  She was worried about a possible stroke with the jaw discomfort  No cough, shortness of breath, fevers, nausea vomiting, diarrhea  No chest pain, abdominal pain  No hematemesis, melena, bright red blood per rectum  Came to the emergency room for evaluation     Emergency room  Neuro exam nonfocal  Head CT scan negative  EKG with inferolateral T wave inversion which is old  Troponin  0.05  Hemoglobin 6.0   Stool heme positive  ______________________________________________________________________  DISCHARGE SUMMARY/HOSPITAL COURSE:  for full details see H&P, daily progress notes, labs, consult notes.      Acute on chronic anemia likely due to renal failure and GI bleed  -EGD noted  -Moderate duodenitis, biopsies taken, pending  -Continue PPI  -Pt on aggranox PTA for CVA prevention, currently on hold   -s/p tranfusion 1unit RBCs 6/5  -S/p IV iron  -no plan for colonoscopy as per GI  -monitor H/H, transfuse for Hb < 7  -resume aggrenox for CVA prevention   -outpatient follow-up with GI for CT colonography     Right jaw pain etiology unclear POA, resolved  Cardiac enzymes negative.  EKG with inferolateral T wave inversion, but this is unchanged from prior EKGs    ESRD  Nephro for HD, MWF     Diabetes mellitus type 2 on insulin, nephropathy POA  -on lispro protamine/lispro 75-25 28 units bid at home +piogliazone  -cont low dose lantus and SSI  -BS high today and will give additional lantus dose     Essential hypertension POA  History of stroke  Coronary artery disease POA in record, patient denies this  Hyperlipidemia POA  -Hold Aggrenox  -Cont' Norvasc, clonidine, Lipitor  -EKG with inferolateral TWI, this is unchanged from several years ago     Anxiety depression  -Continue Xanax as needed, Cymbalta    _______________________________________________________________________  Patient seen and examined by me on discharge day. Pertinent Findings:  Gen:    Not in distress  Chest: Clear lungs  CVS:   Regular rhythm. No edema  Abd:  Soft, not distended, not tender  Neuro:  Alert,   _______________________________________________________________________  DISCHARGE MEDICATIONS:   Discharge Medication List as of 6/9/2021  2:47 PM      START taking these medications    Details   pantoprazole (PROTONIX) 40 mg tablet Take 1 Tablet by mouth two (2) times a day., Normal, Disp-120 Tablet, R-0         CONTINUE these medications which have CHANGED    Details   torsemide (DEMADEX) 100 mg tablet Take 1 Tablet by mouth daily. instructed to take 1/2 tab MWF, No Print, Disp-10 Tablet, R-0         CONTINUE these medications which have NOT CHANGED    Details   vitamin E (AQUA GEMS) 400 unit capsule Take  by mouth daily. , Historical Med      ALPRAZolam (XANAX) 0.25 mg tablet Take 0.25 mg by mouth as needed for Anxiety. , Historical Med      ezetimibe (ZETIA) 10 mg tablet Take 10 mg by mouth daily. , Historical Med      amLODIPine (NORVASC) 10 mg tablet Take 10 mg by mouth daily. , Historical Med      cyanocobalamin 1,000 mcg tablet Take 1,000 mcg by mouth daily. , Historical Med      aspirin-dipyridamole (AGGRENOX)  mg per SR capsule Take 1 Cap by mouth two (2) times a day., Historical Med      insulin lispro protamine/insulin lispro (HUMALOG MIX 75-25) 100 unit/mL (75-25) injection 28 Units by SubCUTAneous route two (2) times a day., Historical Med      DULoxetine (CYMBALTA) 60 mg capsule Take 60 mg by mouth daily. , Historical Med      atorvastatin (LIPITOR) 40 mg tablet Take 40 mg by mouth daily. , Historical Med      ferrous sulfate 325 mg (65 mg iron) tablet Take 325 mg by mouth two (2) times a day., Historical Med      pioglitazone (ACTOS) 15 mg tablet Take 15 mg by mouth daily. , Historical Med      cloNIDine HCl (CATAPRES) 0.1 mg tablet Take 0.1 mg by mouth two (2) times a day., Historical Med      FOLIC ACID/MV,FE,OTHER MIN (CENTRUM COMPLETE PO) Take 1 Tab by mouth daily. , Historical Med         STOP taking these medications       HYDROcodone-acetaminophen (NORCO) 7.5-325 mg per tablet Comments:   Reason for Stopping:                 Patient Follow Up Instructions: Activity: PT/OT per Home Health  Diet: Resume previous diet    Follow-up:    Follow-up Information     Follow up With Specialties Details Why Contact Info    Antoinette Lyn MD Internal Medicine   [address] [place] [address] Freddie Dinorah Culp   On 6/9/2021 THIS IS YOUR HOME HEALTH AGENCY.   IF YOU DO NOT HEAR FROM THEM WITHIN 24-48HRS, PLEASE CONTACT THEM DIRECTLY [phone]        ________________________________________________________________    Risk of deterioration: Moderate    Condition at Discharge:  Stable  __________________________________________________________________    Disposition  Home with family and home health services    ____________________________________________________________________    Code Status: Full Code  ___________________________________________________________________      Total time in minutes spent coordinating this discharge (includes going over instructions, follow-up, prescriptions, and preparing report for sign off to her PCP) :  >30 minutes    Signed:  Anne Thompson DO

## 2021-09-21 VITALS
DIASTOLIC BLOOD PRESSURE: 58 MMHG | HEIGHT: 61 IN | TEMPERATURE: 97.3 F | OXYGEN SATURATION: 100 % | RESPIRATION RATE: 16 BRPM | BODY MASS INDEX: 27.75 KG/M2 | WEIGHT: 147 LBS | HEART RATE: 73 BPM | SYSTOLIC BLOOD PRESSURE: 154 MMHG

## 2021-09-21 PROCEDURE — 99282 EMERGENCY DEPT VISIT SF MDM: CPT

## 2021-09-22 ENCOUNTER — HOSPITAL ENCOUNTER (EMERGENCY)
Age: 76
Discharge: HOME | End: 2021-09-22
Attending: EMERGENCY MEDICINE
Payer: MEDICARE

## 2021-09-22 DIAGNOSIS — T82.838A BLEEDING DUE TO DIALYSIS CATHETER PLACEMENT, INITIAL ENCOUNTER (HCC): Primary | ICD-10-CM

## 2021-09-22 NOTE — DISCHARGE INSTRUCTIONS
It was a pleasure taking care of you in our Emergency Department today. We know that when you come to [facility], you are entrusting us with your health, comfort, and safety. Our physicians and nurses honor that trust, and truly appreciate the opportunity to care for you and your loved ones. We also value your feedback. If you receive a survey about your Emergency Department experience today, please fill it out. We care about our patients' feedback, and we listen to what you have to say. Thank you!       Dr. Nicolasa Augustine MD.

## 2021-09-22 NOTE — ED PROVIDER NOTES
EMERGENCY DEPARTMENT HISTORY AND PHYSICAL EXAM     ------------------------------------------------------------------------------------------------------  Please note that this dictation was completed with [employer], the [employer] voice recognition software. Quite often unanticipated grammatical, syntax, homophones, and other interpretive errors are inadvertently transcribed by the computer software. Please disregard these errors. Please excuse any errors that have escaped final proofreading.  -----------------------------------------------------------------------------------------------------------------    Date: 9/22/2021  Patient Name: Krzysztof Becker    History of Presenting Illness     Chief Complaint   Patient presents with    Post-Op Problem     Patient goes for dialysis M/W/F and on Wednesday her fistula in her left upper arm stopped working so they placed the catheter in her right upper chest on Friday. They attempted to use it yesterday and it started bleeding tonight around 2000. Patient takes Aggrenox. History Provided By: Patient    HPI: Krzysztof Becker is a 78 y.o. female, with significant pmhx of ESRD on HD, who presents via private vehicle to the ED with c/o bleeding from HD catheter insertion site from earlier. No CP/SOB or dizziness. Pt also specifically denies any recent fevers, chills, CP, SOB, nausea, vomiting, diarrhea, abd pain, changes in BM, urinary sxs, or headache. PCP: Ruby Rizo MD    Social Hx: denies tobacco, denies EtOH, denies recreational/ Illicit Drugs     There are no other complaints, changes, or physical findings at this time. No Known Allergies      Current Outpatient Medications   Medication Sig Dispense Refill    torsemide (DEMADEX) 100 mg tablet Take 1 Tablet by mouth daily. instructed to take 1/2 tab MWF 10 Tablet 0    pantoprazole (PROTONIX) 40 mg tablet Take 1 Tablet by mouth two (2) times a day.  120 Tablet 0    vitamin E (AQUA GEMS) 400 unit capsule Take  by mouth daily.  ALPRAZolam (XANAX) 0.25 mg tablet Take 0.25 mg by mouth as needed for Anxiety.  ferrous sulfate 325 mg (65 mg iron) tablet Take 325 mg by mouth two (2) times a day.  ezetimibe (ZETIA) 10 mg tablet Take 10 mg by mouth daily.  amLODIPine (NORVASC) 10 mg tablet Take 10 mg by mouth daily.  pioglitazone (ACTOS) 15 mg tablet Take 15 mg by mouth daily.  cloNIDine HCl (CATAPRES) 0.1 mg tablet Take 0.1 mg by mouth two (2) times a day.  cyanocobalamin 1,000 mcg tablet Take 1,000 mcg by mouth daily.  aspirin-dipyridamole (AGGRENOX)  mg per SR capsule Take 1 Cap by mouth two (2) times a day.  insulin lispro protamine/insulin lispro (HUMALOG MIX 75-25) 100 unit/mL (75-25) injection 28 Units by SubCUTAneous route two (2) times a day.  DULoxetine (CYMBALTA) 60 mg capsule Take 60 mg by mouth daily.  atorvastatin (LIPITOR) 40 mg tablet Take 40 mg by mouth daily.  FOLIC ACID/MV,FE,OTHER MIN (CENTRUM COMPLETE PO) Take 1 Tab by mouth daily. Past History     Past Medical History:  Past Medical History:   Diagnosis Date    Arthritis     CAD (coronary artery disease)     Diabetes ([facility] [address]     Hypertension     Stroke [facility])        Past Surgical History:  Past Surgical History:   Procedure Laterality Date    COLONOSCOPY N/A 2/16/2018    COL[facility] performed [facility] Kd Horn [facility] at [facility] ENDOSCOPY    HX GYN      HX ORTHOPAEDIC      right knee replacement    UPPER GI ENDOSCOPY,BIOPSY  6/7/2021            Family History:  No family history on file. Social History:  Social History     Tobacco Use    Smoking status: Never Smoker    Smokeless tobacco: Never Used   Substance Use Topics    Alcohol use: Yes     Comment: rarely     Drug use: No       Allergies:  No Known Allergies      Review of Systems   Review of Systems   Constitutional: Negative. Negative for fever. Eyes: Negative. Respiratory: Negative.   Negative for shortness of breath. Cardiovascular: Negative for chest pain. Gastrointestinal: Negative for abdominal pain, nausea and vomiting. Endocrine: Negative. Genitourinary: Negative. Negative for difficulty urinating, dysuria and hematuria. Musculoskeletal: Negative. Skin: Positive for wound (bleeding from post op site). Neurological: Negative. Psychiatric/Behavioral: Negative for suicidal ideas. All other systems reviewed and are negative. Physical Exam   Physical Exam  Vitals and nursing note reviewed. Constitutional:       General: She is not in acute distress. Appearance: She is well-developed. She is not diaphoretic. HENT:      Head: Normocephalic and atraumatic. Nose: Nose normal.   Eyes:      General: No scleral icterus. Conjunctiva/sclera: Conjunctivae normal.   Neck:      Trachea: No tracheal deviation. Cardiovascular:      Rate and Rhythm: Normal rate and regular rhythm. Heart sounds: Normal heart sounds. No murmur heard. No friction rub. Pulmonary:      Effort: Pulmonary effort is normal. No respiratory distress. Breath sounds: Normal breath sounds. No stridor. No wheezing or rales. Chest:          Comments: L anterior chest with new HD catheter in place, minimal oozing, noted clot formed under previous dressing placed in triage  Abdominal:      General: Bowel sounds are normal. There is no distension. Palpations: Abdomen is soft. Tenderness: There is no abdominal tenderness. There is no rebound. Musculoskeletal:         General: No tenderness. Normal range of motion. Cervical back: Normal range of motion. Skin:     General: Skin is warm and dry. Findings: No rash. Neurological:      Mental Status: She is alert and oriented to person, place, and time. Cranial Nerves: No cranial nerve deficit. Psychiatric:         Speech: Speech normal.         Behavior: Behavior normal.         Thought Content:  Thought content normal. Judgment: Judgment normal.           Diagnostic Study Results     Labs -   No results found for this or any previous visit (from the past 12 hour(s)). Radiologic Studies -   No orders to display     CT Results  (Last 48 hours)    None        CXR Results  (Last 48 hours)    None            Medical Decision Making   I am the first provider for this patient. I reviewed the vital signs, available nursing notes, past medical history, past surgical history, family history and social history. Vital Signs-Reviewed the patient's vital signs. Patient Vitals for the past 12 hrs:   Temp Pulse Resp BP SpO2   09/21/21 2320 97.3 °F (36.3 °C) 73 16 (!) 154/58 100 %       Pulse Oximetry Analysis - 100% on RA Normal    Records Reviewed/Interpretted: Nursing Notes from triage and Old Medical Records, noting h/o anemia    Provider Notes (Medical Decision Making):     DDX:  Post op bleeding    Plan:  Change dressing    Impression:  Post ob bleeding    ED Course:   Initial assessment performed. The patients presenting problems have been discussed, and they are in agreement with the care plan formulated and outlined with them. I have encouraged them to ask questions as they arise throughout their visit. I reviewed our electronic medical record system for any past medical records that were available that may contribute to the patients current condition, the nursing notes and and vital signs from today's visit  Nursing notes will be reviewed as they become available in realtime while the pt has been in the ED. Brianda Davis MD    Procedure Note - Wound check:  2:17 AM  Procedure by Brianda Daivs MD   Changed dressing to R anterior chest, surgifoam placed and sterile gauze taped in place. No expressible drainage. No surrounding erythema, edema or ecchymosis. Procedure took 1-15 minutes and patient tolerated the procedure welll. 2:17 AM   Progress note:  Pt noted to be feeling better, ready for discharge.   Pt will follow up with vascular surgery as instructed. All questions have been answered, pt voiced understanding and agreement with plan. Specific return precautions provided in addition to instructions for pt to return to the ED immediately should sx worsen at any time. Diya Garcia MD             Critical Care Time:     none      Diagnosis     Clinical Impression:   1. Bleeding due to dialysis catheter placement, initial encounter ([facility] [address]        PLAN:  1. Current Discharge Medication List        2. Follow-up Information     Follow up With Specialties Details Why Inder Lyon MD Internal Medicine Schedule an appointment as soon as possible for a visit in 2 days  [address]  [phone]      The Vascular center  Call today      [facility] EMERGENCY DEPT Emergency Medicine  As needed [address] [facility]  [address] [facility]  [phone]        Return to ED if worse     Disposition:    2:17 AM    The patient's results have been reviewed with family and/or caregiver. They verbally convey their understanding and agreement of the patient's signs, symptoms, diagnosis, treatment and prognosis and additionally agree to follow up as recommended in the discharge instructions or to return to the Emergency Room should the patient's condition change prior to their follow-up appointment. The family and/or caregiver verbally agrees with the care-plan and all of their questions have been answered. The discharge instructions have also been provided to the them with educational information regarding the patient's diagnosis as well a list of reasons why the patient would want to return to the ER prior to their follow-up appointment should their condition change.   Diya Garcia MD

## 2021-09-22 NOTE — ED NOTES
Re-bandaged the dialysis catheter site as patient was bleeding through what her  had put in place for her prior to arrival. They were advised on what number they were in line since they asked. Patient and  are back out in the waiting room awaiting a room in the back.

## 2021-09-22 NOTE — ED NOTES
Discharge instructions given to patient by Katie Herr RN. Verbalized understanding of instructions. Patient discharged without difficulty. Patient discharged in stable condition via wheelchair accompanied by staff.

## 2021-09-23 ENCOUNTER — PATIENT OUTREACH (OUTPATIENT)
Dept: CASE MANAGEMENT | Age: 76
End: 2021-09-23

## 2021-09-23 NOTE — PROGRESS NOTES
Ambulatory Care Management Note    Date/Time:  9/23/2021 12:20 PM    This patient was received as a referral from Daily assignment. Ambulatory Care Manager outreached to patient today to offer care management services. Introduction to self and role of care manager provided. Patient declined care management services at this time. No follow up call scheduled at this time. Patient has Ambulatory Care Manager's contact number for for any questions or concerns.

## 2022-03-18 PROBLEM — K92.2 GI BLEED: Status: ACTIVE | Noted: 2021-06-06

## 2022-03-18 PROBLEM — E87.6 HYPOKALEMIA: Status: ACTIVE | Noted: 2019-11-13

## 2022-03-19 PROBLEM — D64.9 ANEMIA: Status: ACTIVE | Noted: 2021-06-05

## 2022-03-19 PROBLEM — I82.401 RIGHT LEG DVT (HCC): Status: ACTIVE | Noted: 2017-02-19

## 2022-03-19 PROBLEM — I65.23 BILATERAL CAROTID ARTERY STENOSIS: Status: ACTIVE | Noted: 2018-09-05

## 2022-03-20 PROBLEM — N17.9 AKI (ACUTE KIDNEY INJURY) (HCC): Status: ACTIVE | Noted: 2019-11-13

## 2022-03-20 PROBLEM — R29.898 WEAKNESS OF LEFT LEG: Status: ACTIVE | Noted: 2018-09-04

## 2023-05-11 RX ORDER — CLONIDINE HYDROCHLORIDE 0.1 MG/1
TABLET ORAL 2 TIMES DAILY
COMMUNITY

## 2023-05-11 RX ORDER — EZETIMIBE 10 MG/1
10 TABLET ORAL DAILY
COMMUNITY

## 2023-05-11 RX ORDER — PANTOPRAZOLE SODIUM 40 MG/1
TABLET, DELAYED RELEASE ORAL 2 TIMES DAILY
COMMUNITY
Start: 2021-06-09

## 2023-05-11 RX ORDER — ATORVASTATIN CALCIUM 40 MG/1
40 TABLET, FILM COATED ORAL DAILY
COMMUNITY
Start: 2014-10-24

## 2023-05-11 RX ORDER — DULOXETIN HYDROCHLORIDE 60 MG/1
60 CAPSULE, DELAYED RELEASE ORAL DAILY
COMMUNITY

## 2023-05-11 RX ORDER — ASPIRIN AND DIPYRIDAMOLE 25; 200 MG/1; MG/1
1 CAPSULE, EXTENDED RELEASE ORAL 2 TIMES DAILY
COMMUNITY

## 2023-05-11 RX ORDER — FERROUS SULFATE 325(65) MG
TABLET ORAL 2 TIMES DAILY
COMMUNITY

## 2023-05-11 RX ORDER — TORSEMIDE 100 MG/1
TABLET ORAL DAILY
COMMUNITY
Start: 2021-06-09

## 2023-05-11 RX ORDER — ALPRAZOLAM 0.25 MG/1
TABLET ORAL PRN
COMMUNITY

## 2023-05-11 RX ORDER — PIOGLITAZONEHYDROCHLORIDE 15 MG/1
15 TABLET ORAL DAILY
COMMUNITY

## 2023-05-11 RX ORDER — AMLODIPINE BESYLATE 10 MG/1
10 TABLET ORAL DAILY
COMMUNITY

## 2023-08-19 ENCOUNTER — APPOINTMENT (OUTPATIENT)
Facility: HOSPITAL | Age: 78
End: 2023-08-19
Payer: MEDICARE

## 2023-08-19 ENCOUNTER — HOSPITAL ENCOUNTER (EMERGENCY)
Facility: HOSPITAL | Age: 78
Discharge: HOME | End: 2023-08-19
Attending: EMERGENCY MEDICINE
Payer: MEDICARE

## 2023-08-19 VITALS
TEMPERATURE: 98.4 F | HEART RATE: 82 BPM | RESPIRATION RATE: 24 BRPM | WEIGHT: 147 LBS | HEIGHT: 61 IN | BODY MASS INDEX: 27.75 KG/M2 | DIASTOLIC BLOOD PRESSURE: 71 MMHG | OXYGEN SATURATION: 100 % | SYSTOLIC BLOOD PRESSURE: 154 MMHG

## 2023-08-19 DIAGNOSIS — Z99.2 ESRD ON HEMODIALYSIS (HCC): ICD-10-CM

## 2023-08-19 DIAGNOSIS — M54.32 SCIATICA OF LEFT SIDE: ICD-10-CM

## 2023-08-19 DIAGNOSIS — N18.6 ESRD ON HEMODIALYSIS (HCC): ICD-10-CM

## 2023-08-19 DIAGNOSIS — S32.000A LUMBAR COMPRESSION FRACTURE, CLOSED, INITIAL ENCOUNTER (HCC): Primary | ICD-10-CM

## 2023-08-19 PROCEDURE — 6370000000 HC RX 637 (ALT 250 FOR IP): Performed by: EMERGENCY MEDICINE

## 2023-08-19 PROCEDURE — 99283 EMERGENCY DEPT VISIT LOW MDM: CPT

## 2023-08-19 PROCEDURE — 73630 X-RAY EXAM OF FOOT: CPT

## 2023-08-19 PROCEDURE — 72100 X-RAY EXAM L-S SPINE 2/3 VWS: CPT

## 2023-08-19 RX ORDER — PREDNISONE 20 MG/1
40 TABLET ORAL DAILY
Qty: 14 TABLET | Refills: 0 | Status: SHIPPED | OUTPATIENT
Start: 2023-08-19 | End: 2023-08-26

## 2023-08-19 RX ORDER — OXYCODONE HYDROCHLORIDE AND ACETAMINOPHEN 5; 325 MG/1; MG/1
2 TABLET ORAL
Status: COMPLETED | OUTPATIENT
Start: 2023-08-19 | End: 2023-08-19

## 2023-08-19 RX ADMIN — OXYCODONE HYDROCHLORIDE AND ACETAMINOPHEN 2 TABLET: 5; 325 TABLET ORAL at 17:39

## 2023-08-19 ASSESSMENT — PAIN SCALES - GENERAL: PAINLEVEL_OUTOF10: 5

## 2023-08-19 ASSESSMENT — PAIN - FUNCTIONAL ASSESSMENT: PAIN_FUNCTIONAL_ASSESSMENT: 0-10

## 2023-08-19 NOTE — CARE COORDINATION
Update  [facility]izeth Melton [facility] accepted. Initial note  CM received phone call from ED MD requesting CM to arrange [facility] for patient. Requesting same [facility] pt has used in the past, Lizeth Melton, referral sent to Lizeth Melton via Misael Cooper.      [facility]

## 2023-08-19 NOTE — ED PROVIDER NOTES
EMERGENCY DEPARTMENT HISTORY AND PHYSICAL EXAM    Date: 8/19/2023  Patient Name: Kermit Galicia  Patient Age and Sex: 78 y.o. female  MRN:  [id number]  CSN:  [id number]    History of Present Illness     Chief Complaint   Patient presents with    Fall     Pt to ED for increased fall r/o left knee \"giving\" out.  reports in December [facility] informed them she had a cyst on her kidney and they never followed up with a CT, pt now reporting right sided back pain. Also has wounds to buttocks and loosing memory        History Provided By: Patient and Patient's     Ability to gather history was limited by:     HPI: Kermit Galicia, 78 y.o. female   With history of diabetes, coronary artery disease, hypertension, stroke, ESRD on hemodialysis, dementia, brought to emergency department by her  for concerns for gradually worsening confusion and frequent falls. Reports that her legs have been giving out frequently and he is having to assist her to the bathroom and to get up off the floor. She also complains of some pain in the right foot. He reports that she is developing some minor wounds on her buttocks. Tobacco Use      Smoking status: Never      Smokeless tobacco: Never     Past History   The patient's medical, surgical, and social history were reviewed by me today. Current Medications:  No current facility-administered medications on file prior to encounter. Current Outpatient Medications on File Prior to Encounter   Medication Sig Dispense Refill    ALPRAZolam (XANAX) 0.25 MG tablet Take by mouth as needed.       amLODIPine (NORVASC) 10 MG tablet Take 1 tablet by mouth daily      aspirin-dipyridamole (AGGRENOX)  MG per extended release capsule Take 1 capsule by mouth 2 times daily      atorvastatin (LIPITOR) 40 MG tablet Take 1 tablet by mouth daily      cloNIDine (CATAPRES) 0.1 MG tablet Take by mouth 2 times daily      cyanocobalamin 1000 MCG tablet Take 1 tablet by mouth daily described in this documentation on this date [unfilled] for patient Nila Munoz.       Dari Bryan MD  5:26 PM         Dari Bryan MD  08/19/23 1968

## 2023-08-19 NOTE — ED NOTES
Discharge instructions reviewed by Dr Lexa Escalante, all questions answered.       Jimmy Hernandez RN  08/19/23 9056

## 2023-08-29 ENCOUNTER — APPOINTMENT (OUTPATIENT)
Facility: HOSPITAL | Age: 78
End: 2023-08-29
Payer: MEDICARE

## 2023-08-29 ENCOUNTER — HOSPITAL ENCOUNTER (EMERGENCY)
Facility: HOSPITAL | Age: 78
Discharge: HOME | End: 2023-08-29
Attending: EMERGENCY MEDICINE
Payer: MEDICARE

## 2023-08-29 VITALS
DIASTOLIC BLOOD PRESSURE: 62 MMHG | BODY MASS INDEX: 26.53 KG/M2 | OXYGEN SATURATION: 100 % | HEART RATE: 70 BPM | SYSTOLIC BLOOD PRESSURE: 148 MMHG | WEIGHT: 140.43 LBS | TEMPERATURE: 97.8 F | RESPIRATION RATE: 16 BRPM

## 2023-08-29 DIAGNOSIS — S30.811A ABRASION OF ABDOMINAL WALL, INITIAL ENCOUNTER: ICD-10-CM

## 2023-08-29 DIAGNOSIS — M54.50 ACUTE RIGHT-SIDED LOW BACK PAIN WITHOUT SCIATICA: Primary | ICD-10-CM

## 2023-08-29 DIAGNOSIS — S60.222A TRAUMATIC HEMATOMA OF LEFT HAND, INITIAL ENCOUNTER: ICD-10-CM

## 2023-08-29 LAB — CA-I BLD-SCNC: 1.12 MMOL/L (ref 1.12–1.32)

## 2023-08-29 PROCEDURE — 82330 ASSAY OF CALCIUM: CPT

## 2023-08-29 PROCEDURE — 99284 EMERGENCY DEPT VISIT MOD MDM: CPT

## 2023-08-29 PROCEDURE — 6370000000 HC RX 637 (ALT 250 FOR IP): Performed by: EMERGENCY MEDICINE

## 2023-08-29 PROCEDURE — 80047 BASIC METABLC PNL IONIZED CA: CPT

## 2023-08-29 PROCEDURE — 72131 CT LUMBAR SPINE W/O DYE: CPT

## 2023-08-29 PROCEDURE — 73120 X-RAY EXAM OF HAND: CPT

## 2023-08-29 RX ORDER — LIDOCAINE 4 G/G
1 PATCH TOPICAL DAILY
Qty: 20 EACH | Refills: 0 | Status: SHIPPED | OUTPATIENT
Start: 2023-08-29 | End: 2023-09-18

## 2023-08-29 RX ORDER — METHOCARBAMOL 750 MG/1
750 TABLET, FILM COATED ORAL
Status: COMPLETED | OUTPATIENT
Start: 2023-08-29 | End: 2023-08-29

## 2023-08-29 RX ORDER — ACETAMINOPHEN 325 MG/1
650 TABLET ORAL
Status: COMPLETED | OUTPATIENT
Start: 2023-08-29 | End: 2023-08-29

## 2023-08-29 RX ORDER — ACETAMINOPHEN 500 MG
500 TABLET ORAL 4 TIMES DAILY PRN
Qty: 30 TABLET | Refills: 0 | Status: SHIPPED | OUTPATIENT
Start: 2023-08-29

## 2023-08-29 RX ORDER — LIDOCAINE 4 G/G
1 PATCH TOPICAL
Status: DISCONTINUED | OUTPATIENT
Start: 2023-08-29 | End: 2023-08-29 | Stop reason: HOSPADM

## 2023-08-29 RX ADMIN — ACETAMINOPHEN 650 MG: 325 TABLET ORAL at 10:12

## 2023-08-29 RX ADMIN — METHOCARBAMOL TABLETS 750 MG: 750 TABLET, COATED ORAL at 10:12

## 2023-08-29 ASSESSMENT — PAIN SCALES - GENERAL: PAINLEVEL_OUTOF10: 8

## 2023-08-29 NOTE — ED NOTES
Discharge medications reviewed with the patient and spouse and appropriate educational materials and side effects teaching were provided.        Patricia Pandey RN  08/29/23 5986

## 2023-08-29 NOTE — ED NOTES
Mepalex placed over small healing,dry ulcer on left buttocks. Non stick dressing and medipore tape placed over small skin tear on lower left abdominal fold.      Ishan Menendez RN  08/29/23 1840

## 2023-08-29 NOTE — DISCHARGE INSTRUCTIONS
are available 24 hours a day. If a prescription has been provided, please have it filled as soon as possible to prevent a delay in treatment. If you have any questions or reservations about taking the medication due to side effects or interactions with other medications, please call your primary care provider or contact the ER.

## 2023-08-29 NOTE — ED NOTES
Unable to obtain blood for POC chem 8 d/t difficult stick. 3 attempts by this nurse and 2 attempts by phlebotomy all unsuccessful.      Richard Ventura RN  08/29/23 7725

## 2023-08-29 NOTE — ED NOTES
to bedside. States pt does not remember all things; states he has been trying to get help at home but with dialysis MWF and doctors appts he can hardly schedule home health.       Zechariah Álvarez RN  08/29/23 8169

## 2023-08-29 NOTE — CARE COORDINATION
12:27 PM  Informed that pt is stable for d/c per ED MD. AVS has been updated with [facility] information. Referral placed via [facility] with clinicals/H&P from today's visit. [address] [place] [address] to use previous orders from 8/19/23 as they reported that these can be used and these encompass the recommended services from today's visit. [address] [place] [address] to contact pt's spouse to coordinate [facility]. No further CM needs. Initial note:  CM acknowledges consult for case management assistance with securing HH. Per chart review and speaking with pt's spouse at bedside, referral sent to [facility] on 8/19/23. CM contacted [facility] who states that pt was marked as \"non-admit\" on 8/21/23 due to pt's spouse requesting delay in start of care due to scheduling conflicts. [facility] has order from 8/19 for SN/PT/OT/HH Aide which can be used. Requests updated H&P when available from today's ED visit. Per ED MD, awaiting imaging to determine disposition. Discussed with pt and pt's spouse the recommendation for home health to begin therapy d/t falls and evaluate/treat wounds. Home health confirmed that they are able to be flexible in scheduling with pt's dialysis schedule and OP follow up appts. Will update [facility] when disposition is known. Pt resides at home with spouse, spouse reports that pt uses wheelchair for mobility, spouse is main caregiver and support. Pt attends dialysis at [address] [facility] M/W/F chair time 10:15AM, returns home from dialysis around 2:30 PM. Pt is seen by PCP Dr. Kilo Gonsalez.        Lisa Kumari, [address] [facility], [address] [facility]  [phone]/Available on [employer]

## 2023-08-29 NOTE — ED PROVIDER NOTES
[facility] EMERGENCY DEPT  EMERGENCY DEPARTMENT HISTORY AND PHYSICAL EXAM      Date: 8/29/2023  Patient Name: Valeria Jennings  MRN: [id number]  [address] 1945  Date of evaluation: 8/29/2023  Provider: Hilton Dow MD   Note Started: 10:02 AM EDT 8/29/23    HISTORY OF PRESENT ILLNESS     Chief Complaint   Patient presents with    Fall     GLF pt \"went down on knees\" while walking out of bathroom today. No LOC       Back Pain     Pain at right low back after fall    Swelling     Swelling at left hand after fall today       History Provided By: Patient, ems    HPI: Valeria Jennings is a 78 y.o. female with a history of falls in the past, presenting with ground-level fall. According to EMS, patient was walking out of the bathroom when her knee just gave out and she fell on her buttocks. Complaining of right low back pain after the fall as well as left hand swelling. Denies any loss of consciousness. States that she was here last week for a fall as well. Has a walker. States that she is on blood thinners but unsure which one. PAST MEDICAL HISTORY   Past Medical History:  Past Medical History:   Diagnosis Date    Arthritis     CAD (coronary artery disease)     Diabetes ([facility])     Hypertension     Stroke [facility])        Past Surgical History:  Past Surgical History:   Procedure Laterality Date    COLONOSCOPY N/A 2/16/2018    COLONOSCOPY performed by Anay Loco MD at [address] [place] [address] [address]      right knee replacement    UPPER GI ENDOSCOPY,BIOPSY  6/7/2021            Family History:  No family history on file.     Social History:  Social History     Tobacco Use    Smoking status: Never    Smokeless tobacco: Never   Substance Use Topics    Alcohol use: Yes    Drug use: No       Allergies:  No Known Allergies    PCP: César Portillo MD    Current Meds:   Current Facility-Administered Medications   Medication Dose Route Frequency Provider Last Rate Last Admin    lidocaine 4 % external patch 1 patch  1

## 2023-08-30 LAB
ANION GAP BLD CALC-SCNC: ABNORMAL MMOL/L (ref 10–20)
CA-I BLD-MCNC: 1.12 MMOL/L (ref 1.12–1.32)
CHLORIDE BLD-SCNC: 102 MMOL/L (ref 98–107)
GLUCOSE BLD-MCNC: 177 MG/DL (ref 65–100)
POTASSIUM BLD-SCNC: 4.2 MMOL/L (ref 3.5–5.1)
SODIUM BLD-SCNC: 136 MMOL/L (ref 136–145)

## 2024-01-23 ENCOUNTER — HOSPITAL ENCOUNTER (OUTPATIENT)
Age: 79
Discharge: HOME | End: 2024-01-26
Payer: MEDICARE

## 2024-01-23 DIAGNOSIS — M54.50 LOW BACK PAIN, UNSPECIFIED BACK PAIN LATERALITY, UNSPECIFIED CHRONICITY, UNSPECIFIED WHETHER SCIATICA PRESENT: ICD-10-CM

## 2024-01-23 DIAGNOSIS — M54.2 CERVICALGIA: ICD-10-CM

## 2024-01-23 PROCEDURE — 72050 X-RAY EXAM NECK SPINE 4/5VWS: CPT

## 2024-01-23 PROCEDURE — 72100 X-RAY EXAM L-S SPINE 2/3 VWS: CPT

## 2024-05-27 ENCOUNTER — HOSPITAL ENCOUNTER (EMERGENCY)
Facility: HOSPITAL | Age: 79
Discharge: HOME | End: 2024-05-27
Attending: EMERGENCY MEDICINE
Payer: MEDICARE

## 2024-05-27 VITALS
WEIGHT: 123.68 LBS | SYSTOLIC BLOOD PRESSURE: 148 MMHG | DIASTOLIC BLOOD PRESSURE: 82 MMHG | HEIGHT: 61 IN | BODY MASS INDEX: 23.35 KG/M2 | TEMPERATURE: 98.1 F | HEART RATE: 98 BPM | RESPIRATION RATE: 19 BRPM | OXYGEN SATURATION: 95 %

## 2024-05-27 DIAGNOSIS — N18.6 ESRD ON HEMODIALYSIS (HCC): ICD-10-CM

## 2024-05-27 DIAGNOSIS — D63.8 ANEMIA, CHRONIC DISEASE: ICD-10-CM

## 2024-05-27 DIAGNOSIS — Z99.2 ESRD ON HEMODIALYSIS (HCC): ICD-10-CM

## 2024-05-27 DIAGNOSIS — T82.9XXA COMPLICATION OF ARTERIOVENOUS DIALYSIS FISTULA, INITIAL ENCOUNTER: Primary | ICD-10-CM

## 2024-05-27 LAB
ALBUMIN SERPL-MCNC: 1.5 G/DL (ref 3.5–5)
ALBUMIN/GLOB SERPL: 0.4 (ref 1.1–2.2)
ALP SERPL-CCNC: 96 U/L (ref 45–117)
ALT SERPL-CCNC: 11 U/L (ref 12–78)
ANION GAP SERPL CALC-SCNC: 3 MMOL/L (ref 5–15)
AST SERPL-CCNC: 26 U/L (ref 15–37)
BASOPHILS # BLD: 0 K/UL (ref 0–0.1)
BASOPHILS NFR BLD: 0 % (ref 0–1)
BILIRUB SERPL-MCNC: 0.5 MG/DL (ref 0.2–1)
BUN SERPL-MCNC: 30 MG/DL (ref 6–20)
BUN/CREAT SERPL: 6 (ref 12–20)
CALCIUM SERPL-MCNC: 10.4 MG/DL (ref 8.5–10.1)
CHLORIDE SERPL-SCNC: 109 MMOL/L (ref 97–108)
CO2 SERPL-SCNC: 31 MMOL/L (ref 21–32)
COMMENT:: NORMAL
CREAT SERPL-MCNC: 4.95 MG/DL (ref 0.55–1.02)
DIFFERENTIAL METHOD BLD: ABNORMAL
EKG DIAGNOSIS: NORMAL
EKG Q-T INTERVAL: 358 MS
EKG QRS DURATION: 68 MS
EKG QTC CALCULATION (BAZETT): 459 MS
EKG R AXIS: 25 DEGREES
EKG T AXIS: 246 DEGREES
EKG VENTRICULAR RATE: 99 BPM
EOSINOPHIL # BLD: 0.2 K/UL (ref 0–0.4)
EOSINOPHIL NFR BLD: 1 % (ref 0–7)
ERYTHROCYTE [DISTWIDTH] IN BLOOD BY AUTOMATED COUNT: 20.4 % (ref 11.5–14.5)
GLOBULIN SER CALC-MCNC: 4.1 G/DL (ref 2–4)
GLUCOSE SERPL-MCNC: 181 MG/DL (ref 65–100)
HCT VFR BLD AUTO: 21.4 % (ref 35–47)
HGB BLD-MCNC: 6.9 G/DL (ref 11.5–16)
IMM GRANULOCYTES # BLD AUTO: 0.2 K/UL (ref 0–0.04)
IMM GRANULOCYTES NFR BLD AUTO: 1 % (ref 0–0.5)
LYMPHOCYTES # BLD: 0.8 K/UL (ref 0.8–3.5)
LYMPHOCYTES NFR BLD: 5 % (ref 12–49)
MAGNESIUM SERPL-MCNC: 2.3 MG/DL (ref 1.6–2.4)
MCH RBC QN AUTO: 25 PG (ref 26–34)
MCHC RBC AUTO-ENTMCNC: 32.2 G/DL (ref 30–36.5)
MCV RBC AUTO: 77.5 FL (ref 80–99)
MONOCYTES # BLD: 0.9 K/UL (ref 0–1)
MONOCYTES NFR BLD: 6 % (ref 5–13)
NEUTS SEG # BLD: 13 K/UL (ref 1.8–8)
NEUTS SEG NFR BLD: 87 % (ref 32–75)
NRBC # BLD: 0.03 K/UL (ref 0–0.01)
NRBC BLD-RTO: 0.2 PER 100 WBC
PLATELET # BLD AUTO: 171 K/UL (ref 150–400)
POTASSIUM SERPL-SCNC: 2.8 MMOL/L (ref 3.5–5.1)
PROT SERPL-MCNC: 5.6 G/DL (ref 6.4–8.2)
RBC # BLD AUTO: 2.76 M/UL (ref 3.8–5.2)
RBC MORPH BLD: ABNORMAL
SODIUM SERPL-SCNC: 143 MMOL/L (ref 136–145)
SPECIMEN HOLD: NORMAL
WBC # BLD AUTO: 15.1 K/UL (ref 3.6–11)

## 2024-05-27 PROCEDURE — 93005 ELECTROCARDIOGRAM TRACING: CPT | Performed by: EMERGENCY MEDICINE

## 2024-05-27 PROCEDURE — 99284 EMERGENCY DEPT VISIT MOD MDM: CPT

## 2024-05-27 PROCEDURE — 85025 COMPLETE CBC W/AUTO DIFF WBC: CPT

## 2024-05-27 PROCEDURE — 83735 ASSAY OF MAGNESIUM: CPT

## 2024-05-27 PROCEDURE — 36600 WITHDRAWAL OF ARTERIAL BLOOD: CPT

## 2024-05-27 PROCEDURE — 80053 COMPREHEN METABOLIC PANEL: CPT

## 2024-05-27 PROCEDURE — 36415 COLL VENOUS BLD VENIPUNCTURE: CPT

## 2024-05-27 ASSESSMENT — ENCOUNTER SYMPTOMS
SORE THROAT: 0
COUGH: 0
VOMITING: 0

## 2024-05-27 ASSESSMENT — PAIN - FUNCTIONAL ASSESSMENT: PAIN_FUNCTIONAL_ASSESSMENT: NONE - DENIES PAIN

## 2024-05-27 NOTE — ED TRIAGE NOTES
Patient arrives via EMS from [facility] with c/o a clogged dialysis port on the right. Last received dialysis on Friday. Has old fistula on left.     2 L nasal cannula at baseline.

## 2024-05-27 NOTE — ED PROVIDER NOTES
[facility] EMERGENCY DEP  EMERGENCY DEPARTMENT ENCOUNTER      Pt Name: Bianca Peralta  MRN: [id number]  Birthdate 1945  Date of evaluation: 5/27/2024  Provider: Jalil Tobias MD    CHIEF COMPLAINT       Chief Complaint   Patient presents with    Vascular Access Problem         HISTORY OF PRESENT ILLNESS   (Location/Symptom, Timing/Onset, Context/Setting, Quality, Duration, Modifying Factors, Severity)  Note limiting factors.   78-year-old female presents from [facility] via EMS with concern for clogged dialysis access.  Patient has a history of renal disease on dialysis,, stroke, hypertension, diabetes.  According to EMS, she received [facility] on Friday.  She went for dialysis today with they were unable to get blood flow through her right upper extremity fistula.  Patient has no complaints at this time.    The history is provided by the patient, the EMS personnel and medical records.         Review of External Medical Records:     Nursing Notes were reviewed.    REVIEW OF SYSTEMS    (2-9 systems for level 4, 10 or more for level 5)     Review of Systems   Constitutional:  Negative for fatigue.   HENT:  Negative for sore throat.    Eyes:  Negative for visual disturbance.   Respiratory:  Negative for cough.    Cardiovascular:  Negative for palpitations.   Gastrointestinal:  Negative for vomiting.   Genitourinary:  Negative for difficulty urinating.   Musculoskeletal:  Negative for myalgias.   Skin:  Negative for rash.   Neurological:  Negative for weakness.       Except as noted above the remainder of the review of systems was reviewed and negative.       PAST MEDICAL HISTORY     Past Medical History:   Diagnosis Date    Arthritis     CAD (coronary artery disease)     Diabetes ([facility])     Hypertension     Stroke ([facility])          SURGICAL HISTORY       Past Surgical History:   Procedure Laterality Date    COLONOSCOPY N/A 2/16/2018    COLONOSCOPY performed by Edwar Justin MD at [facility] ENDOSCOPY    GYN

## 2024-05-27 NOTE — CARE COORDINATION
CM consult requested for transport back to [facility]. Noted history of O2 /2L, lung ca, respiratory failure, and ESRD. Call placed to [facility] richard w/ [place], Dispatch next available ETA 10 min/ 1200.     Updates provided to Nursing . CM will complete PCS form.     Report will need to be called to [phone].

## (undated) DEVICE — NEEDLE HYPO 18GA L1.5IN PNK S STL HUB POLYPR SHLD REG BVL

## (undated) DEVICE — AIRLIFE™ U/CONNECT-IT OXYGEN TUBING 7 FEET (2.1 M) CRUSH-RESISTANT OXYGEN TUBING, VINYL TIPPED: Brand: AIRLIFE™

## (undated) DEVICE — Device

## (undated) DEVICE — GARMENT,MEDLINE,DVT,INT,CALF,MED, GEN2: Brand: MEDLINE

## (undated) DEVICE — BAG BELONG PT PERS CLEAR HANDL

## (undated) DEVICE — TOWEL 4 PLY TISS 19X30 SUE WHT

## (undated) DEVICE — YANKAUER,TAPERED BULBOUS TIP,W/O VENT: Brand: MEDLINE

## (undated) DEVICE — FORCEPS BX L160CM DIA8MM GRSP DISECT CUP TIP NONLOCKING ROT

## (undated) DEVICE — Z DISCONTINUED USE 2751540 TUBING IRRIG L10IN DISP PMP ENDOGATOR

## (undated) DEVICE — SOL INJ SOD CL 0.9% 500ML BG --

## (undated) DEVICE — CONNECTOR TBNG AUX H2O JET DISP FOR OLY 160/180 SER

## (undated) DEVICE — BLOCK BITE ENDOSCP AD 21 MM W/ DIL BLU LF DISP

## (undated) DEVICE — 1200 GUARD II KIT W/5MM TUBE W/O VAC TUBE: Brand: GUARDIAN

## (undated) DEVICE — FOGARTY ARTERIAL EMBOLECTOMY CATHETER 4F 80CM: Brand: FOGARTY

## (undated) DEVICE — BW-412T DISP COMBO CLEANING BRUSH: Brand: SINGLE USE COMBINATION CLEANING BRUSH

## (undated) DEVICE — SYR 3ML LL TIP 1/10ML GRAD --

## (undated) DEVICE — SET ADMIN 16ML TBNG L100IN 2 Y INJ SITE IV PIGGY BK DISP

## (undated) DEVICE — CATH IV AUTOGRD BC BLU 22GA 25 -- INSYTE

## (undated) DEVICE — Z DISCONTINUED NO SUB IDED SET EXTN W/ 4 W STPCOCK M SPIN LOK 36IN

## (undated) DEVICE — CATH IV AUTOGRD BC PNK 20GA 25 -- INSYTE

## (undated) DEVICE — INFECTION CONTROL KIT SYS

## (undated) DEVICE — REM POLYHESIVE ADULT PATIENT RETURN ELECTRODE: Brand: VALLEYLAB

## (undated) DEVICE — SUTURE VCRL SZ 3-0 L27IN ABSRB UD L26MM SH 1/2 CIR J416H

## (undated) DEVICE — QUILTED PREMIUM COMFORT UNDERPAD,EXTRA HEAVY: Brand: WINGS

## (undated) DEVICE — SOLIDIFIER FLD 2OZ 1500CC N DISINF IN BTL DISP SAFESORB

## (undated) DEVICE — SOLIDIFIER FLUID 3000 CC ABSORB

## (undated) DEVICE — BITE BLK ENDOSCP AD 54FR GRN POLYETH ENDOSCP W STRP SLD

## (undated) DEVICE — DRAPE,REIN 53X77,STERILE: Brand: MEDLINE

## (undated) DEVICE — SYR 10ML LUER LOK 1/5ML GRAD --

## (undated) DEVICE — KENDALL RADIOLUCENT FOAM MONITORING ELECTRODE -RECTANGULAR SHAPE: Brand: KENDALL

## (undated) DEVICE — CONTAINER SPEC 20 ML LID NEUT BUFF FORMALIN 10 % POLYPR STS

## (undated) DEVICE — STRAP,POSITIONING,KNEE/BODY,FOAM,4X60": Brand: MEDLINE

## (undated) DEVICE — Z DISCONTINUED PER MEDLINE LINE GAS SAMPLING O2/CO2 LNG AD 13 FT NSL W/ TBNG FILTERLINE

## (undated) DEVICE — HANDLE LT SNAP ON ULT DURABLE LENS FOR TRUMPF ALC DISPOSABLE

## (undated) DEVICE — ENDO CARRY-ON PROCEDURE KIT INCLUDES ENZYMATIC SPONGE, GAUZE, BIOHAZARD LABEL, TRAY, LUBRICANT, DIRTY SCOPE LABEL, WATER LABEL, TRAY, DRAWSTRING PAD, AND DEFENDO 4-PIECE KIT.: Brand: ENDO CARRY-ON PROCEDURE KIT

## (undated) DEVICE — NEONATAL-ADULT SPO2 SENSOR: Brand: NELLCOR

## (undated) DEVICE — KIT IV STRT W CHLORAPREP PD 1ML

## (undated) DEVICE — SOL IRR STRL H2O 1000ML BTL --

## (undated) DEVICE — PENCIL SMK EVAC 10 FT BLADE ELECTRD ROCKER FOR TELSCP

## (undated) DEVICE — BAG SPEC BIOHZRD 10 X 10 IN --

## (undated) DEVICE — SYRINGE MED 20ML STD CLR PLAS LUERLOCK TIP N CTRL DISP

## (undated) DEVICE — FORCEPS BX L240CM JAW DIA2.8MM L CAP W/ NDL MIC MESH TOOTH

## (undated) DEVICE — STERILE POLYISOPRENE POWDER-FREE SURGICAL GLOVES WITH EMOLLIENT COATING: Brand: PROTEXIS

## (undated) DEVICE — BAG SPEC BIOHZD LF 2MIL 6X10IN -- CONVERT TO ITEM 357326

## (undated) DEVICE — SPONGE GZ W4XL4IN COT 12 PLY TYP VII WVN C FLD DSGN

## (undated) DEVICE — PREP SKN CHLRAPRP APL 26ML STR --

## (undated) DEVICE — BASIN EMSIS 16OZ GRAPHITE PLAS KID SHP MOLD GRAD FOR ORAL

## (undated) DEVICE — ELECTRODE,RADIOTRANSLUCENT,FOAM,5PK: Brand: MEDLINE

## (undated) DEVICE — CANN NASAL O2 CAPNOGRAPHY AD -- FILTERLINE